# Patient Record
Sex: FEMALE | Race: WHITE | NOT HISPANIC OR LATINO | Employment: OTHER | URBAN - METROPOLITAN AREA
[De-identification: names, ages, dates, MRNs, and addresses within clinical notes are randomized per-mention and may not be internally consistent; named-entity substitution may affect disease eponyms.]

---

## 2017-02-01 ENCOUNTER — OFFICE VISIT (OUTPATIENT)
Dept: FAMILY MEDICINE | Facility: CLINIC | Age: 72
End: 2017-02-01
Payer: MEDICARE

## 2017-02-01 VITALS — HEIGHT: 67 IN | TEMPERATURE: 98.2 F | WEIGHT: 161 LBS | BODY MASS INDEX: 25.27 KG/M2 | RESPIRATION RATE: 18 BRPM

## 2017-02-01 DIAGNOSIS — M70.62 TROCHANTERIC BURSITIS OF LEFT HIP: Primary | ICD-10-CM

## 2017-02-01 PROCEDURE — 99214 OFFICE O/P EST MOD 30 MIN: CPT | Performed by: FAMILY MEDICINE

## 2017-02-01 NOTE — PROGRESS NOTES
SUBJECTIVE:                                                    Loraine Aviles is a 71 year old female who presents to clinic today for the following health issues:      Joint Pain     Onset: about 10 days     Description:   Location: left hip  Character: Sharp    Intensity: moderate    Progression of Symptoms: worse    Accompanying Signs & Symptoms:  Other symptoms: none   History:   Previous similar pain: no    Precipitating factors:   Trauma or overuse: no     Alleviating factors:  Improved by: nothing       Therapies Tried and outcome: none          Problem list and histories reviewed & adjusted, as indicated.  Additional history: as documented    Further history obtained, clarified or corrected by physician:  She complains of pain over the left greater trochanter when she lays on it at night.    OBJECTIVE:  LUNGS: clear to auscultation, normal breath sounds  CV: RRR without murmur  ABD: BS+, soft, nontender, no masses, no hepatosplenomegaly  EXT: There is tenderness to palpation over the left greater trochanter. She has full range of motion of the hip joint without pain or tenderness.    ASSESSMENT:  Trochanteric bursitis of left hip    PLAN:  Orders Placed This Encounter     naproxen (NAPROSYN) 375 MG tablet     If no improvement then consider cortisone injection.

## 2017-02-01 NOTE — PATIENT INSTRUCTIONS
Thank you for choosing [facility].  You may be receiving a survey in the mail from [facility] regarding your visit today.  Please take a few minutes to complete and return the survey to let us know how we are doing.      Our Clinic hours are:  Mondays    7:20 am - 7 pm  Tues -  Fri  7:20 am - 5 pm    Clinic Phone: [phone]    The clinic lab opens at 7:30 am Mon - Fri and appointments are required.    [facility]. [phone]  Monday-Thursday 8 am - 7pm  Tues/Wed/Fri 8 am - 5:30 pm

## 2017-02-07 ENCOUNTER — TELEPHONE (OUTPATIENT)
Dept: FAMILY MEDICINE | Facility: CLINIC | Age: 72
End: 2017-02-07

## 2017-02-07 DIAGNOSIS — M70.62 TROCHANTERIC BURSITIS OF LEFT HIP: Primary | ICD-10-CM

## 2017-02-07 RX ORDER — NAPROXEN 500 MG/1
500 TABLET ORAL
Qty: 90 TABLET | Refills: 0 | Status: SHIPPED | OUTPATIENT
Start: 2017-02-07 | End: 2017-02-17

## 2017-02-07 NOTE — TELEPHONE ENCOUNTER
Naproxen was written for 3 tabs/day, #30.  [facility] gave them #90.  Did you want a 10 d course #30 or enough for 1 month #90?  Advise.  Felicity

## 2017-02-07 NOTE — TELEPHONE ENCOUNTER
Reason for Call:  Other clarification    Detailed comments: Provide care calling stating they need clarification on the directions of pt's prescription Naproxen. They said on paperwork it says 3 times a day for 10 days and when they picked it up the directions were 1 a day.    Phone Number Patient can be reached at: Other phone number:  [facility] [phone]    Best Time: day    Can we leave a detailed message on this number? not sure    Call taken on 2/7/2017 at 9:40 AM by Lina Chirinos

## 2017-02-10 ENCOUNTER — APPOINTMENT (OUTPATIENT)
Dept: GENERAL RADIOLOGY | Facility: CLINIC | Age: 72
End: 2017-02-10
Attending: NURSE PRACTITIONER
Payer: MEDICARE

## 2017-02-10 ENCOUNTER — HOSPITAL ENCOUNTER (EMERGENCY)
Facility: CLINIC | Age: 72
Discharge: HOME | End: 2017-02-10
Attending: NURSE PRACTITIONER | Admitting: NURSE PRACTITIONER
Payer: MEDICARE

## 2017-02-10 VITALS
BODY MASS INDEX: 26.24 KG/M2 | DIASTOLIC BLOOD PRESSURE: 81 MMHG | WEIGHT: 165 LBS | OXYGEN SATURATION: 95 % | TEMPERATURE: 98.2 F | HEART RATE: 63 BPM | SYSTOLIC BLOOD PRESSURE: 142 MMHG | RESPIRATION RATE: 18 BRPM

## 2017-02-10 DIAGNOSIS — S93.401A SPRAIN OF RIGHT ANKLE, UNSPECIFIED LIGAMENT, INITIAL ENCOUNTER: ICD-10-CM

## 2017-02-10 PROCEDURE — 73630 X-RAY EXAM OF FOOT: CPT | Mod: RT

## 2017-02-10 PROCEDURE — 99213 OFFICE O/P EST LOW 20 MIN: CPT

## 2017-02-10 PROCEDURE — 99213 OFFICE O/P EST LOW 20 MIN: CPT | Performed by: NURSE PRACTITIONER

## 2017-02-10 PROCEDURE — 73610 X-RAY EXAM OF ANKLE: CPT | Mod: RT

## 2017-02-10 NOTE — ED PROVIDER NOTES
History     Chief Complaint   Patient presents with     Ankle Pain     Tripped and fell this morning. Complaining of pain to the right ankle.      HPI  Loraine Aviles is a 71 year old female with history of seizure disorder, urinary incontinence, depression, and falls who is accompanied by her caregiver for evaluation of right ankle pain after falling this morning.  She tripped over her feet and fell this morning inside the house on a hard floor.  She denies hitting her head.  Denies neck or back pain.  No other areas of injury or concern.    Patient Active Problem List   Diagnosis     Seizure disorder (H)     Edema     Fall     Delay in development     Other urinary incontinence     Mild major depression (H)     Urgency of urination     HYPERLIPIDEMIA LDL GOAL <130     Benign neoplasm of colon     Health Care Home     Advanced directives, counseling/discussion     Nuclear cataract       No current facility-administered medications on file prior to encounter.  Current Outpatient Prescriptions on File Prior to Encounter:  naproxen (NAPROSYN) 500 MG tablet Take 1 tablet (500 mg) by mouth 3 times daily (with meals) for 10 days and then as needed not to exceed 2/day.   naproxen (NAPROSYN) 375 MG tablet Take 1 tablet (375 mg) by mouth 3 times daily (with meals) for 10 days and then PRN not to exceed 2 tabs/day.   naproxen (NAPROSYN) 375 MG tablet Take 1 tablet (375 mg) by mouth 3 times daily (with meals)   potassium chloride SA (POTASSIUM CHLORIDE) 20 MEQ CR tablet Take 1 tablet (20 mEq) by mouth daily   Diaper Rash Products (A+D PREVENT) OINT Apply after voiding and with shower.   zinc oxide (DESITIN) 40 % ointment Apply topically with every void/shower.   carBAMazepine (TEGRETOL) 200 MG tablet Take 300 mg by mouth 2 times daily 200 mg in Am and 300 mg in the PM   FLUoxetine HCl, PMDD, 10 MG TABS Take 10 mg by mouth daily   FLUoxetine HCl, PMDD, 20 MG CAPS Take 20 mg by mouth daily   furosemide (LASIX) 40 MG  tablet Take 40 mg by mouth daily   melatonin 3 MG tablet Take by mouth nightly as needed for sleep   ibuprofen (ADVIL,MOTRIN) 200 MG tablet Take 200 mg by mouth every 4 hours as needed for mild pain   potassium chloride (K-DUR) 10 MEQ tablet Take 2 tablets (20 mEq) by mouth daily   Multiple Vitamins-Minerals (CERTAGEN) TABS Take 1 tablet by mouth daily   divalproex (DEPAKOTE) 500 MG EC tablet Take 1 tablet by mouth 3 times daily.   acetaminophen (TYLENOL) 500 MG tablet Take 2 tablets by mouth 3 times daily.   CALCIUM 500 + D PO 1 TABLET ORALLY 3 TIMES DAILY   FOSAMAX 70 MG OR TABS ONE TABLET WEEKLY   SIMVASTATIN 20 MG OR TABS 1 TABLET AT BEDTIME         I have reviewed the Medications, Allergies, Past Medical and Surgical History, and Social History in the Epic system.    Review of Systems  As mentioned above in the history present illness. All other systems were reviewed and are negative.    Physical Exam   BP: 142/81 mmHg  Pulse: 63  Temp: 98.2  F (36.8  C)  Resp: 18  Weight: 74.844 kg (165 lb)  SpO2: 95 %  Physical Exam   Appearance:She appears well, vital signs are normal.   Head/Neck: Normocephalic. Atraumatic. No cervical spine tenderness.  Resp: Lung sounds CTA.  CV: RRR. No murmur.  Musc: No midline back tenderness. Right Ankle swelling and tenderness over the lateral malleolus. No tenderness over the medial aspect of the ankle. There is mild fifth metatarsal is not tender. The ankle joint is intact without excessive opening on stressing.   The rest of the foot, ankle and leg exam is normal.            ED Course   Procedures         Results for orders placed or performed during the hospital encounter of 02/10/17 (from the past 48 hour(s))   Ankle XR, G/E 3 views, right    Narrative    XR ANKLE RT G/E 3 VW, XR FOOT RT G/E 3 VW  2/10/2017 12:57 PM    HISTORY:  fall, lateral foot and ankle pain    COMPARISON:  12/5/2007      Impression    IMPRESSION:      Ankle: No fractures identified. Diffuse osteopenia.  Stable appearing  hardware in the talocalcaneal region status post arthrodesis.  Degenerative changes at the lateral and probably medial ankle joint.  Mild soft tissue swelling over the lateral malleolus. Dense dystrophic  calcifications in the soft tissues lateral to the ankle.     Foot: Diffuse osteopenia. There is a screw across the proximal shaft  of the first toe proximal phalanx without evidence for complication.  Flexion of multiple toe IP joints. No evidence for acute traumatic  injury. Deformity of the third metatarsal is likely related to old  healed trauma.    JESS GARCIA MD   Foot  XR, G/E 3 views, right    Narrative    XR ANKLE RT G/E 3 VW, XR FOOT RT G/E 3 VW  2/10/2017 12:57 PM    HISTORY:  fall, lateral foot and ankle pain    COMPARISON:  12/5/2007      Impression    IMPRESSION:      Ankle: No fractures identified. Diffuse osteopenia. Stable appearing  hardware in the talocalcaneal region status post arthrodesis.  Degenerative changes at the lateral and probably medial ankle joint.  Mild soft tissue swelling over the lateral malleolus. Dense dystrophic  calcifications in the soft tissues lateral to the ankle.     Foot: Diffuse osteopenia. There is a screw across the proximal shaft  of the first toe proximal phalanx without evidence for complication.  Flexion of multiple toe IP joints. No evidence for acute traumatic  injury. Deformity of the third metatarsal is likely related to old  healed trauma.    JESS GARCIA MD       Labs Ordered and Resulted from Time of ED Arrival Up to the Time of Departure from the ED - No data to display    Assessments & Plan (with Medical Decision Making)   Loraine Aviles is a 71 year old female with history of seizure disorder, urinary incontinence, depression, and falls who is accompanied by her caregiver for evaluation of right ankle pain after falling this morning.  She tripped over her feet and fell this morning inside the house on a hard floor.  She denies  hitting her head.  Denies neck or back pain.  No other areas of injury or concern. VSS. Afebrile. Right Ankle swelling and tenderness over the lateral malleolus. No tenderness over the medial aspect of the ankle. There is mild fifth metatarsal is not tender. Remainder of exam as noted above unremarkable. Xray negative for acute fracture.  Discussed results with patient.  She uses a walker at home and believes this will be sufficient, caregiver agrees. Will treat for ankle sprain with ace wrap today.  Instructed to Ice, elevate and take Tylenol or Ibuprofen for pain.  Follow-up with PCP if failing to improve.     I have reviewed the nursing notes.    I have reviewed the findings, diagnosis, plan and need for follow up with the patient.    Discharge Medication List as of 2/10/2017  1:22 PM          Final diagnoses:   Sprain of right ankle, unspecified ligament, initial encounter       2/10/2017   [facility] EMERGENCY DEPARTMENT      Gretchen Godron APRN CNP  02/10/17 [id number]

## 2017-02-10 NOTE — ED NOTES
Agree with triage note. Patient does report having a previous fracture to this foot in the past. Not on blood thinners. Denies hitting her head. Fall occurred inside this morning.

## 2017-02-10 NOTE — DISCHARGE INSTRUCTIONS
Treating Ankle Sprains  Treatment will depend on how bad your sprain is. For a severe sprain, healing may take 3 months or more.  Right after your injury: Use R.I.C.E.    Rest: At first, keep weight off the ankle as much as you can. You may be given crutches to help you walk without putting weight on the ankle.    Ice: Put an ice pack on the ankle for 15 minutes. Remove the pack and wait at least 30 minutes. Repeat for up to 3 days. This helps reduce swelling.    Compression: To reduce swelling and keep the joint stable, you may need to wrap the ankle with an elastic bandage. For more severe sprains, you may need an ankle brace or a cast.    Elevation: To reduce swelling, keep your ankle raised above your heart when you sit or lie down.  Medicine  Your healthcare provider may suggest oral non-steroidal anti-inflammatory medicine (NSAIDs), such as ibuprofen. This relieves the pain and helps reduce any swelling. Be sure to take your medicine as directed.  Contrast baths  After 3 days, soak your ankle in warm water for 30 seconds, then in cool water for 30 seconds. Go back and forth for 5 minutes. Doing this every 2 hours will help keep the swelling down.  Exercises    After about 2 to 3 weeks, you may be given exercises to strengthen the ligaments and muscles in the ankle. Doing these exercises will help prevent another ankle sprain. Exercises may include standing on your toes and then on your heels and doing ankle curls.  Ankle curls    Sit on the edge of a sturdy table or lie on your back.    Pull your toes toward you. Then point them away from you. Repeat for 2 to 3 minutes.     6259-4627 [employer]. [address] [place] [address], [place] [address]. All rights reserved. This information is not intended as a substitute for professional medical care. Always follow your healthcare professional's instructions.

## 2017-02-10 NOTE — ED AVS SNAPSHOT
[facility] Emergency Department    [address] [address]    Phone:  [phone]    Fax:  [phone]                                       Loraine Aviles   MRN: [id number]    Department:  [facility] Emergency Department   Date of Visit:  2/10/2017           After Visit Summary Signature Page     I have received my discharge instructions, and my questions have been answered. I have discussed any challenges I see with this plan with the nurse or doctor.    ..........................................................................................................................................  Patient/Patient Representative Signature      ..........................................................................................................................................  Patient Representative Print Name and Relationship to Patient    ..................................................               ................................................  Date                                            Time    ..........................................................................................................................................  Reviewed by Signature/Title    ...................................................              ..............................................  Date                                                            Time

## 2017-02-10 NOTE — ED AVS SNAPSHOT
[facility] Emergency Department    [address]    [facility] [address]    Phone:  [phone]    Fax:  [phone]                                       Loraine Aviles   MRN: [id number]    Department:  [facility] Emergency Department   Date of Visit:  2/10/2017           Patient Information     Date Of Birth          1945        Your diagnoses for this visit were:     Sprain of right ankle, unspecified ligament, initial encounter        You were seen by Gretchen Gordon APRN CNP.      Follow-up Information     Follow up with Carlos Servin MD.    Specialty:  Family Practice    Why:  As needed    Contact information:    [facility]  [address]  [facility] [address]  [phone]          Discharge Instructions         Treating Ankle Sprains  Treatment will depend on how bad your sprain is. For a severe sprain, healing may take 3 months or more.  Right after your injury: Use R.I.C.E.    Rest: At first, keep weight off the ankle as much as you can. You may be given crutches to help you walk without putting weight on the ankle.    Ice: Put an ice pack on the ankle for 15 minutes. Remove the pack and wait at least 30 minutes. Repeat for up to 3 days. This helps reduce swelling.    Compression: To reduce swelling and keep the joint stable, you may need to wrap the ankle with an elastic bandage. For more severe sprains, you may need an ankle brace or a cast.    Elevation: To reduce swelling, keep your ankle raised above your heart when you sit or lie down.  Medicine  Your healthcare provider may suggest oral non-steroidal anti-inflammatory medicine (NSAIDs), such as ibuprofen. This relieves the pain and helps reduce any swelling. Be sure to take your medicine as directed.  Contrast baths  After 3 days, soak your ankle in warm water for 30 seconds, then in cool water for 30 seconds. Go back and forth for 5 minutes. Doing this every 2 hours will help keep the swelling  down.  Exercises    After about 2 to 3 weeks, you may be given exercises to strengthen the ligaments and muscles in the ankle. Doing these exercises will help prevent another ankle sprain. Exercises may include standing on your toes and then on your heels and doing ankle curls.  Ankle curls    Sit on the edge of a sturdy table or lie on your back.    Pull your toes toward you. Then point them away from you. Repeat for 2 to 3 minutes.     7266-3093 [employer]. [address] [place] [address], [place] [address]. All rights reserved. This information is not intended as a substitute for professional medical care. Always follow your healthcare professional's instructions.          24 Hour Appointment Hotline       To make an appointment at any [facility], call [phone] ([phone]). If you don't have a family doctor or clinic, we will help you find one. [facility] clinics are conveniently located to serve the needs of you and your family.             Review of your medicines      Our records show that you are taking the medicines listed below. If these are incorrect, please call your family doctor or clinic.        Dose / Directions Last dose taken    A+D PREVENT Oint   Quantity:  60 g        Apply after voiding and with shower.   Refills:  1        acetaminophen 500 MG tablet   Commonly known as:  TYLENOL   Dose:  2 tablet   Quantity:  360 tablet        Take 2 tablets by mouth 3 times daily.   Refills:  0        CALCIUM 500 + D PO        1 TABLET ORALLY 3 TIMES DAILY   Refills:  0        carBAMazepine 200 MG tablet   Commonly known as:  TEGretol   Dose:  300 mg        Take 300 mg by mouth 2 times daily 200 mg in Am and 300 mg in the PM   Refills:  0        CERTAGEN Tabs   Dose:  1 tablet   Quantity:  30 tablet        Take 1 tablet by mouth daily   Refills:  0        DEPAKOTE 500 MG EC tablet   Dose:  500 mg   Quantity:  120 tablet   Generic drug:  divalproex        Take 1 tablet by mouth 3 times  daily.   Refills:  0        * FLUoxetine HCl (PMDD) 10 MG Tabs   Dose:  10 mg        Take 10 mg by mouth daily   Refills:  0        * FLUoxetine HCl (PMDD) 20 MG Caps   Dose:  20 mg        Take 20 mg by mouth daily   Refills:  0        FOSAMAX 70 MG tablet   Quantity:  4   Generic drug:  alendronate        ONE TABLET WEEKLY   Refills:  0        furosemide 40 MG tablet   Commonly known as:  LASIX   Dose:  40 mg        Take 40 mg by mouth daily   Refills:  0        ibuprofen 200 MG tablet   Commonly known as:  ADVIL/MOTRIN   Dose:  200 mg        Take 200 mg by mouth every 4 hours as needed for mild pain   Refills:  0        melatonin 3 MG tablet        Take by mouth nightly as needed for sleep   Refills:  0        * naproxen 375 MG tablet   Commonly known as:  NAPROSYN   Dose:  375 mg   Quantity:  30 tablet        Take 1 tablet (375 mg) by mouth 3 times daily (with meals)   Refills:  1        * naproxen 500 MG tablet   Commonly known as:  NAPROSYN   Dose:  500 mg   Quantity:  90 tablet        Take 1 tablet (500 mg) by mouth 3 times daily (with meals) for 10 days and then as needed not to exceed 2/day.   Refills:  0        * naproxen 375 MG tablet   Commonly known as:  NAPROSYN   Dose:  375 mg   Quantity:  90 tablet        Take 1 tablet (375 mg) by mouth 3 times daily (with meals) for 10 days and then PRN not to exceed 2 tabs/day.   Refills:  0        potassium chloride 10 MEQ tablet   Commonly known as:  K-TAB,KLOR-CON   Dose:  20 mEq   Quantity:  1 tablet        Take 2 tablets (20 mEq) by mouth daily   Refills:  0        potassium chloride SA 20 MEQ CR tablet   Commonly known as:  potassium chloride   Dose:  20 mEq   Quantity:  90 tablet        Take 1 tablet (20 mEq) by mouth daily   Refills:  1        simvastatin 20 MG tablet   Commonly known as:  ZOCOR   Quantity:  30        1 TABLET AT BEDTIME   Refills:  thru 9/08        zinc oxide 40 % ointment   Commonly known as:  DESITIN   Quantity:  60 g        Apply  topically with every void/shower.   Refills:  1        * Notice:  This list has 5 medication(s) that are the same as other medications prescribed for you. Read the directions carefully, and ask your doctor or other care provider to review them with you.            Procedures and tests performed during your visit     Ankle XR, G/E 3 views, right    Foot  XR, G/E 3 views, right      Orders Needing Specimen Collection     None      Pending Results     No orders found from 2/9/2017 to 2/11/2017.            Pending Culture Results     No orders found from 2/9/2017 to 2/11/2017.       Test Results from your hospital stay           2/10/2017  1:11 PM - Interface, Radiant Ib      Narrative     XR ANKLE RT G/E 3 VW, XR FOOT RT G/E 3 VW  2/10/2017 12:57 PM    HISTORY:  fall, lateral foot and ankle pain    COMPARISON:  12/5/2007        Impression     IMPRESSION:      Ankle: No fractures identified. Diffuse osteopenia. Stable appearing  hardware in the talocalcaneal region status post arthrodesis.  Degenerative changes at the lateral and probably medial ankle joint.  Mild soft tissue swelling over the lateral malleolus. Dense dystrophic  calcifications in the soft tissues lateral to the ankle.     Foot: Diffuse osteopenia. There is a screw across the proximal shaft  of the first toe proximal phalanx without evidence for complication.  Flexion of multiple toe IP joints. No evidence for acute traumatic  injury. Deformity of the third metatarsal is likely related to old  healed trauma.    JESS GARCIA MD         2/10/2017  1:11 PM - Interface, Radiant Ib      Narrative     XR ANKLE RT G/E 3 VW, XR FOOT RT G/E 3 VW  2/10/2017 12:57 PM    HISTORY:  fall, lateral foot and ankle pain    COMPARISON:  12/5/2007        Impression     IMPRESSION:      Ankle: No fractures identified. Diffuse osteopenia. Stable appearing  hardware in the talocalcaneal region status post arthrodesis.  Degenerative changes at the lateral and probably medial  "ankle joint.  Mild soft tissue swelling over the lateral malleolus. Dense dystrophic  calcifications in the soft tissues lateral to the ankle.     Foot: Diffuse osteopenia. There is a screw across the proximal shaft  of the first toe proximal phalanx without evidence for complication.  Flexion of multiple toe IP joints. No evidence for acute traumatic  injury. Deformity of the third metatarsal is likely related to old  healed trauma.    JESS GARCIA MD                Thank you for choosing [facility]       Thank you for choosing [facility] for your care. Our goal is always to provide you with excellent care. Hearing back from our patients is one way we can continue to improve our services. Please take a few minutes to complete the written survey that you may receive in the mail after you visit with us. Thank you!        [facility] Information     [employer] lets you send messages to your doctor, view your test results, renew your prescriptions, schedule appointments and more. To sign up, go to [email or web address] ([employer] . Click on \"Log in\" on the left side of the screen, which will take you to the Welcome page. Then click on \"Sign up Now\" on the right side of the page.     You will be asked to enter the access code listed below, as well as some personal information. Please follow the directions to create your username and password.     Your access code is: [email or web address]  Expires: 2017  1:22 PM     Your access code will  in 90 days. If you need help or a new code, please call your [facility] clinic or [phone].        Care EveryWhere ID     This is your Care EveryWhere ID. This could be used by other organizations to access your [facility] medical records  [id number]        After Visit Summary       This is your record. Keep this with you and show to your community pharmacist(s) and doctor(s) at your next visit.                  "

## 2017-03-01 ENCOUNTER — TELEPHONE (OUTPATIENT)
Dept: FAMILY MEDICINE | Facility: CLINIC | Age: 72
End: 2017-03-01

## 2017-03-01 NOTE — TELEPHONE ENCOUNTER
.  Panel Management Review      Patient has the following on her problem list:     Depression / Dysthymia review  PHQ-9 SCORE 7/28/2014 4/6/2016 3/1/2017   Total Score 2 - -   Total Score - 0 2      Patient is due for:  PHQ9      Composite cancer screening  Chart review shows that this patient is due/due soon for the following Mammogram   Summary:    Patient is due/failing the following:   MAMMOGRAM and PHQ9    Action needed:   Did PHQ 9  Patient refused mammogram     Type of outreach:    Spoke with patient - did PHQ 9     Questions for provider review:    None                                                                                                                                    Laurence Whitfield CMA       Chart routed to none .

## 2017-03-02 ASSESSMENT — PATIENT HEALTH QUESTIONNAIRE - PHQ9: SUM OF ALL RESPONSES TO PHQ QUESTIONS 1-9: 2

## 2017-03-14 ENCOUNTER — OFFICE VISIT (OUTPATIENT)
Dept: FAMILY MEDICINE | Facility: CLINIC | Age: 72
End: 2017-03-14
Payer: MEDICARE

## 2017-03-14 ENCOUNTER — MEDICAL CORRESPONDENCE (OUTPATIENT)
Dept: HEALTH INFORMATION MANAGEMENT | Facility: CLINIC | Age: 72
End: 2017-03-14

## 2017-03-14 ENCOUNTER — TELEPHONE (OUTPATIENT)
Dept: FAMILY MEDICINE | Facility: CLINIC | Age: 72
End: 2017-03-14

## 2017-03-14 ENCOUNTER — TRANSFERRED RECORDS (OUTPATIENT)
Dept: HEALTH INFORMATION MANAGEMENT | Facility: CLINIC | Age: 72
End: 2017-03-14

## 2017-03-14 ENCOUNTER — RADIANT APPOINTMENT (OUTPATIENT)
Dept: GENERAL RADIOLOGY | Facility: CLINIC | Age: 72
End: 2017-03-14
Attending: FAMILY MEDICINE
Payer: MEDICARE

## 2017-03-14 VITALS
OXYGEN SATURATION: 95 % | HEIGHT: 67 IN | RESPIRATION RATE: 16 BRPM | DIASTOLIC BLOOD PRESSURE: 70 MMHG | BODY MASS INDEX: 26.06 KG/M2 | HEART RATE: 74 BPM | WEIGHT: 166 LBS | TEMPERATURE: 98.8 F | SYSTOLIC BLOOD PRESSURE: 130 MMHG

## 2017-03-14 DIAGNOSIS — R05.9 COUGH: ICD-10-CM

## 2017-03-14 DIAGNOSIS — J20.8 ACUTE VIRAL BRONCHITIS: Primary | ICD-10-CM

## 2017-03-14 PROCEDURE — 99213 OFFICE O/P EST LOW 20 MIN: CPT | Performed by: FAMILY MEDICINE

## 2017-03-14 PROCEDURE — 71020 XR CHEST 2 VW: CPT

## 2017-03-14 RX ORDER — GUAIFENESIN 200 MG/1
400 TABLET ORAL EVERY 6 HOURS PRN
Qty: 60 TABLET | Refills: 0 | Status: SHIPPED | OUTPATIENT
Start: 2017-03-14 | End: 2018-05-22

## 2017-03-14 NOTE — PROGRESS NOTES
"  SUBJECTIVE:                                                    Loraine Aviles is a 71 year old female who presents to clinic today for the following health issues:    Patient is here with personal staff.    ENT Symptoms             Symptoms: cc Present Absent Comment   Fever/Chills   X SLIGHT TEMPERATURE OF 99.3   Fatigue   X    Muscle Aches   X    Eye Irritation   X    Sneezing   X    Nasal John/Drg   X    Sinus Pressure/Pain   X    Loss of smell   X    Dental pain   X    Sore Throat   X    Swollen Glands   X    Ear Pain/Fullness   X    Cough  X  Patient reports it is crackling; staff describes it as \"very deep\".  Not reported to be occurring in severe bouts.   Wheeze   x    Chest Pain  X  TIGHTNESS, PRESSURE   Shortness of breath   X    Rash   X    Other  X  HEADACHE FROM COUGHING     Symptom duration:  4-5 DAYS   Symptom severity:  MODERATE   Treatments tried:  OTC ROBUTUSSIN   Contacts:  possibly from work   Verified above history with patient.      Problem list and histories reviewed & adjusted, as indicated.  Additional history: as documented    Patient Active Problem List   Diagnosis     Seizure disorder (H)     Edema     Fall     Delay in development     Other urinary incontinence     Mild major depression (H)     Urgency of urination     HYPERLIPIDEMIA LDL GOAL <130     Benign neoplasm of colon     Health Care Home     Advanced directives, counseling/discussion     Nuclear cataract     Past Surgical History   Procedure Laterality Date     Surgical history of -        (R) Hammertoe Surgery--all toes on right foot     Colonoscopy  05/19/00     Appendectomy       Breast biopsy, rt/lt       breast bx     Surgical history of -        IPG replacement      Joint replacemtn, knee rt/lt Left 10/2014     Joint Replacement knee RT/LT     Replace generator stimulator (location) N/A 1/13/2015     Procedure: REPLACE GENERATOR STIMULATOR (LOCATION);  Surgeon: Jami Cheek MD;  Location: [facility]     Implant " stimulator and leads sacral nerve (stage one and two) N/A 1/13/2015     Procedure: IMPLANT STIMULATOR AND LEADS SACRAL NERVE (STAGE ONE AND TWO);  Surgeon: Jami Cheek MD;  Location: [facility]     Phacoemulsification with standard intraocular lens implant Right 10/8/2015     Procedure: PHACOEMULSIFICATION WITH STANDARD INTRAOCULAR LENS IMPLANT;  Surgeon: Andrés Brandon MD;  Location: [facility]       Social History   Substance Use Topics     Smoking status: Never Smoker     Smokeless tobacco: Never Used     Alcohol use No     Family History   Problem Relation Age of Onset     Family History Negative No family hx of          Current Outpatient Prescriptions   Medication Sig Dispense Refill     guaiFENesin (ORGANIDIN) 200 MG TABS tablet Take 2 tablets (400 mg) by mouth every 6 hours as needed for cough 60 tablet 0     potassium chloride SA (POTASSIUM CHLORIDE) 20 MEQ CR tablet Take 1 tablet (20 mEq) by mouth daily 90 tablet 1     carBAMazepine (TEGRETOL) 200 MG tablet Take 300 mg by mouth 2 times daily 200 mg in Am and 300 mg in the PM       FLUoxetine HCl, PMDD, 10 MG TABS Take 10 mg by mouth daily       FLUoxetine HCl, PMDD, 20 MG CAPS Take 20 mg by mouth daily       furosemide (LASIX) 40 MG tablet Take 40 mg by mouth daily       Multiple Vitamins-Minerals (CERTAGEN) TABS Take 1 tablet by mouth daily 30 tablet      divalproex (DEPAKOTE) 500 MG EC tablet Take 1 tablet by mouth 3 times daily. 120 tablet 0     acetaminophen (TYLENOL) 500 MG tablet Take 2 tablets by mouth 3 times daily. 360 tablet 0     CALCIUM 500 + D PO 1 TABLET ORALLY 3 TIMES DAILY       FOSAMAX 70 MG OR TABS ONE TABLET WEEKLY 4 0     SIMVASTATIN 20 MG OR TABS 1 TABLET AT BEDTIME 30 thru 9/08     ibuprofen (ADVIL,MOTRIN) 200 MG tablet Take 200 mg by mouth every 4 hours as needed for mild pain       Allergies   Allergen Reactions     Detrol [Tolterodine Tartrate] Other (See Comments)     falls     Ditropan [Oxybutynin Chloride] Other (See Comments)  "    falls     Medicated Other (See Comments)     Allergic to generic seizure meds.       Reviewed and updated as needed this visit by clinical staff  Allergies       Reviewed and updated as needed this visit by Provider         ROS:  C: NEGATIVE for fever, chills, change in weight  I: NEGATIVE for worrisome rashes, moles or lesions  E: NEGATIVE for vision changes or irritation  ENT/MOUTH: see above  RESP:as above  CV: NEGATIVE for chest pain, palpitations or peripheral edema  GI: NEGATIVE for nausea, abdominal pain, heartburn, or change in bowel habits  M: NEGATIVE for significant arthralgias or myalgia    OBJECTIVE:                                                    /70  Pulse 74  Temp 98.8  F (37.1  C) (Tympanic)  Resp 16  Ht 5' 6.5\" (1.689 m)  Wt 166 lb (75.3 kg)  SpO2 95%  BMI 26.39 kg/m2  Body mass index is 26.39 kg/(m^2).  GENERAL: well-nourished,  alert and no distress, ambulatory w/o assist; coughed a few times with deep sounding crackle   EYES: pink conjunctivae, no icterus  NECK: moderate cervical adenopathy, nontender  HEENT: tympanic membrane intact and pearly bilaterally, nose with mild congestion, no sinus tenderness, throat moderately erythematous, tonsils grade +1 w/ no exudates, no oral ulcers  RESP: fair air entry, equal breath sounds, no wheezing; no crackles; mild bronchial breath sounds but patient does not have good effort in deep inspiration to assess breath sounds adequately  CV: normal rate, regular rhythm, normal S1 S2, no S3 or S4 and no murmur  SKIN:  Good turgor, no rashes    Diagnostic test results:  Diagnostic Test Results:  No results found for this or any previous visit (from the past 24 hour(s)).     ASSESSMENT/PLAN:                                                        ICD-10-CM    1. Acute viral bronchitis J20.8 guaiFENesin (ORGANIDIN) 200 MG TABS tablet  Discussed since difficult to assess lungs form ausc, CXR may be needed. Patient and staff concurred.  Discussed CXR " images appeared unchanged compared to previous.  No focal consolidation.  Treat supportively.  Expectorant use prescribed.  Push oral fluids.  If radiology reads otherwise, treat accordingly.  Return precautions discussed and given to patient.     2. Cough R05 XR Chest 2 Views     guaiFENesin (ORGANIDIN) 200 MG TABS tablet       Follow up with Provider - 2-3 days if worsening   There are no Patient Instructions on file for this visit.    Eliud Garner MD  [facility]

## 2017-03-14 NOTE — MR AVS SNAPSHOT
After Visit Summary   3/14/2017    Loraine Aviles    MRN: [id number]           Patient Information     Date Of Birth          1945        Visit Information        Provider Department      3/14/2017 4:20 PM Eliud Garner MD [facility]        Today's Diagnoses     Acute viral bronchitis    -  1    Cough          Care Instructions      Bronchitis, Viral (Adult)    You have a viral bronchitis. Bronchitis is inflammation and swelling of the lining of the lungs. This is often caused by an infection. Symptoms include a dry, hacking cough that is worse at night. The cough may bring up yellow-green mucus. You may also feel short of breath or wheeze. Other symptoms may include tiredness, chest discomfort, and chills.  Bronchitis that is caused by a virus is not treated with antibiotics. Instead, medicines may be given to help relieve symptoms. Symptoms can last up to 2 weeks, although the cough may last much longer.  This illness is contagious during the first few days and is spread through the air by coughing and sneezing, or by direct contact (touching the sick person and then touching your own eyes, nose, or mouth).  Most viral illnesses resolve within 10 to 14 days with rest and simple home remedies, although they may sometimes last for several weeks.  Home care    If symptoms are severe, rest at home for the first 2 to 3 days. When you go back to your usual activities, don't let yourself get too tired.    Do not smoke. Also avoid being exposed to secondhand smoke.    You may use over-the-counter medicine to control fever or pain, unless another pain medicine was prescribed. (Note: If you have chronic liver or kidney disease or have ever had a stomach ulcer or gastrointestinal bleeding, talk with your healthcare provider before using these medicines. Also talk to your provider if you are taking medicine to prevent blood clots.) Aspirin should never be given to anyone  younger than 18 years of age who is ill with a viral infection or fever. It may cause severe liver or brain damage.    Your appetite may be poor, so a light diet is fine. Avoid dehydration by drinking 6 to 8 glasses of fluids per day (such as water, soft drinks, sports drinks, juices, tea, or soup). Extra fluids will help loosen secretions in the nose and lungs.    Over-the-counter cough, cold, and sore-throat medicines will not shorten the length of the illness, but they may help to reduce symptoms. (Note: Do not use decongestants if you have high blood pressure.)  Follow-up care  Follow up with your healthcare provider, or as advised.  If you had an X-ray or ECG (electrocardiogram), a specialist will review it. You will be notified of any new findings that may affect your care.  Note: If you are age 65 or older, or if you have a chronic lung disease or condition that affects your immune system, or you smoke, talk to your healthcare provider about having pneumococcal vaccinations and a yearly influenza vaccination (flu shot).  When to seek medical advice   Call your healthcare provider right away if any of these occur:    Fever of 100.4 F (38 C) or higher    Coughing up increased amounts of colored sputum    Weakness, drowsiness, headache, facial pain, ear pain, or a stiff neck  Call 911, or get immediate medical care  Contact emergency services right away if any of these occur:    Coughing up blood    Worsening weakness, drowsiness, headache, or stiff neck    Trouble breathing, wheezing, or pain with breathing    [id number] [employer]. [address] [place] [address], [place] [address]. All rights reserved. This information is not intended as a substitute for professional medical care. Always follow your healthcare professional's instructions.              Follow-ups after your visit        Who to contact     If you have questions or need follow up information about today's clinic visit or your schedule  "please contact [facility] directly at [phone].  Normal or non-critical lab and imaging results will be communicated to you by MyChart, letter or phone within 4 business days after the clinic has received the results. If you do not hear from us within 7 days, please contact the clinic through [facility] or phone. If you have a critical or abnormal lab result, we will notify you by phone as soon as possible.  Submit refill requests through [facility] or call your pharmacy and they will forward the refill request to us. Please allow 3 business days for your refill to be completed.          Additional Information About Your Visit        [employer] Information     [facility] lets you send messages to your doctor, view your test results, renew your prescriptions, schedule appointments and more. To sign up, go to [email or web address] . Click on \"Log in\" on the left side of the screen, which will take you to the Welcome page. Then click on \"Sign up Now\" on the right side of the page.     You will be asked to enter the access code listed below, as well as some personal information. Please follow the directions to create your username and password.     Your access code is: [email or web address]  Expires: 2017  2:22 PM     Your access code will  in 90 days. If you need help or a new code, please call your [place] clinic or [phone].        Care EveryWhere ID     This is your Care EveryWhere ID. This could be used by other organizations to access your [place] medical records  [id number]        Your Vitals Were     Pulse Temperature Respirations Height Pulse Oximetry BMI (Body Mass Index)    74 98.8  F (37.1  C) (Tympanic) 16 5' 6.5\" (1.689 m) 95% 26.39 kg/m2       Blood Pressure from Last 3 Encounters:   17 130/70   02/10/17 142/81   16 123/75    Weight from Last 3 Encounters:   17 166 lb (75.3 kg)   02/10/17 165 lb (74.8 kg)   17 161 lb (73 kg)              We Performed the Following "     XR Chest 2 Views          Today's Medication Changes          These changes are accurate as of: 3/14/17  5:23 PM.  If you have any questions, ask your nurse or doctor.               Start taking these medicines.        Dose/Directions    guaiFENesin 200 MG Tabs tablet   Commonly known as:  ORGANIDIN   Used for:  Acute viral bronchitis, Cough   Started by:  Eliud Garner MD        Dose:  400 mg   Take 2 tablets (400 mg) by mouth every 6 hours as needed for cough   Quantity:  60 tablet   Refills:  0            Where to get your medicines      These medications were sent to [facility] [place] - [address] [address]  [id number] [place] [address] [address], [facility] [address]     Phone:  [phone]     guaiFENesin 200 MG Tabs tablet                Primary Care Provider Office Phone # Fax #    Carlos Servin [phone]       [facility] [address] [facility] [address]        Thank you!     Thank you for choosing [facility]  for your care. Our goal is always to provide you with excellent care. Hearing back from our patients is one way we can continue to improve our services. Please take a few minutes to complete the written survey that you may receive in the mail after your visit with us. Thank you!             Your Updated Medication List - Protect others around you: Learn how to safely use, store and throw away your medicines at www.disposemymeds.org.          This list is accurate as of: 3/14/17  5:23 PM.  Always use your most recent med list.                   Brand Name Dispense Instructions for use    acetaminophen 500 MG tablet    TYLENOL    360 tablet    Take 2 tablets by mouth 3 times daily.       CALCIUM 500 + D PO      1 TABLET ORALLY 3 TIMES DAILY       carBAMazepine 200 MG tablet    TEGretol     Take 300 mg by mouth 2 times daily 200 mg in Am and 300 mg in the PM       CERTAGEN Tabs     30 tablet    Take 1 tablet by mouth daily        DEPAKOTE 500 MG EC tablet   Generic drug:  divalproex     120 tablet    Take 1 tablet by mouth 3 times daily.       * FLUoxetine HCl (PMDD) 10 MG Tabs      Take 10 mg by mouth daily       * FLUoxetine HCl (PMDD) 20 MG Caps      Take 20 mg by mouth daily       FOSAMAX 70 MG tablet   Generic drug:  alendronate     4    ONE TABLET WEEKLY       furosemide 40 MG tablet    LASIX     Take 40 mg by mouth daily       guaiFENesin 200 MG Tabs tablet    ORGANIDIN    60 tablet    Take 2 tablets (400 mg) by mouth every 6 hours as needed for cough       ibuprofen 200 MG tablet    ADVIL/MOTRIN     Take 200 mg by mouth every 4 hours as needed for mild pain       potassium chloride SA 20 MEQ CR tablet    potassium chloride    90 tablet    Take 1 tablet (20 mEq) by mouth daily       simvastatin 20 MG tablet    ZOCOR    30    1 TABLET AT BEDTIME       * Notice:  This list has 2 medication(s) that are the same as other medications prescribed for you. Read the directions carefully, and ask your doctor or other care provider to review them with you.

## 2017-03-14 NOTE — PATIENT INSTRUCTIONS

## 2017-03-14 NOTE — TELEPHONE ENCOUNTER
Reason for call:  Patient reporting a symptom    Symptom or request: Norma, group home caregiver calling.  Pt has had a bad cough X 4 days - No fever, no SOB or other symptoms.  Group home staff member Norma needs to know if she should be seen in clinic?       Duration (how long have symptoms been present): 4 days    Have you been treated for this before? Yes    Additional comments:     Phone Number patient can be reached at:  Home number on file [phone] (home)    Best Time:  any    Can we leave a detailed message on this number:  YES    Call taken on 3/14/2017 at 12:30 PM by Shauna Kent

## 2017-03-14 NOTE — NURSING NOTE
"Chief Complaint   Patient presents with     URI     Symptoms for 4-5 days of coughing (rattle like sound), congestion. Slight temperature.       Initial /70  Pulse 74  Temp 98.8  F (37.1  C) (Tympanic)  Resp 16  Ht 5' 6.5\" (1.689 m)  Wt 166 lb (75.3 kg)  SpO2 95%  BMI 26.39 kg/m2 Estimated body mass index is 26.39 kg/(m^2) as calculated from the following:    Height as of this encounter: 5' 6.5\" (1.689 m).    Weight as of this encounter: 166 lb (75.3 kg).  Medication Reconciliation: complete  "

## 2017-03-14 NOTE — TELEPHONE ENCOUNTER
appt was made with provider in clinic for Wed 3/15/17 at 2:40 pm   Staff verbalized understanding and had no further questions at this time.   Encounter closed.   Albertina HUGHES RN

## 2017-04-26 ENCOUNTER — TELEPHONE (OUTPATIENT)
Dept: FAMILY MEDICINE | Facility: CLINIC | Age: 72
End: 2017-04-26

## 2017-04-26 NOTE — TELEPHONE ENCOUNTER
Panel Management Review      Patient has the following on her problem list:     Depression / Dysthymia review  PHQ-9 SCORE 7/28/2014 4/6/2016 3/1/2017   Total Score 2 - -   Total Score - 0 2      Patient is due for:  DAP      Composite cancer screening  Chart review shows that this patient is due/due soon for the following Mammogram and Colonoscopy  Summary:    Patient is due/failing the following:   COLONOSCOPY, DAP and MAMMOGRAM    Action needed:    will do AAP-  Patient still declines H.M. Do not call     Type of outreach:    Sent letter.    Questions for provider review:    None                                                                                                                                    Laurence Whitfield CMA       Chart routed to  none .

## 2017-04-26 NOTE — LETTER
My Asthma Action Plan  Name: Loraine Aviles   YOB: 1945  Date: 4/26/2017   My doctor: Carlos Servin MD   My clinic: [facility]        My Control Medicine:  My Rescue Medicine:   My Asthma Severity: intermittent  Avoid your asthma triggers: upper respiratory infections               GREEN ZONE     Good Control    I feel good    No cough or wheeze    Can work, sleep and play without asthma symptoms       Take your asthma control medicine every day.     1. If exercise triggers your asthma, take your rescue medication    15 minutes before exercise or sports, and    During exercise if you have asthma symptoms  2. Spacer to use with inhaler: If you have a spacer, make sure to use it with your inhaler             YELLOW ZONE     Getting Worse  I have ANY of these:    I do not feel good    Cough or wheeze    Chest feels tight    Wake up at night   1. Keep taking your Green Zone medications  2. Start taking your rescue medicine:    every 20 minutes for up to 1 hour. Then every 4 hours for 24-48 hours.  3. If you stay in the Yellow Zone for more than 12-24 hours, contact your doctor.  4. If you do not return to the Green Zone in 12-24 hours or you get worse, start taking your oral steroid medicine if prescribed by your provider.           RED ZONE     Medical Alert - Get Help  I have ANY of these:    I feel awful    Medicine is not helping    Breathing getting harder    Trouble walking or talking    Nose opens wide to breathe       1. Take your rescue medicine NOW  2. If your provider has prescribed an oral steroid medicine, start taking it NOW  3. Call your doctor NOW  4. If you are still in the Red Zone after 20 minutes and you have not reached your doctor:    Take your rescue medicine again and    Call 911 or go to the emergency room right away    See your regular doctor within 2 weeks of an Emergency Room or Urgent Care visit for follow-up treatment.        Electronically signed by:  Akanksha Whitfield, April 26, 2017    Annual Reminders:  Meet with Asthma Educator,  Flu Shot in the Fall, consider Pneumonia Vaccination for patients with asthma (aged 19 and older).    Pharmacy: [facility] - [facility] [address]                    Asthma Triggers  How To Control Things That Make Your Asthma Worse    Triggers are things that make your asthma worse.  Look at the list below to help you find your triggers and what you can do about them.  You can help prevent asthma flare-ups by staying away from your triggers.      Trigger                                                          What you can do   Cigarette Smoke  Tobacco smoke can make asthma worse. Do not allow smoking in your home, car or around you.  Be sure no one smokes at a child s day care or school.  If you smoke, ask your health care provider for ways to help you quit.  Ask family members to quit too.  Ask your health care provider for a referral to Quit Plan to help you quit smoking, or call 4-145-737-PLAN.     Colds, Flu, Bronchitis  These are common triggers of asthma. Wash your hands often.  Don t touch your eyes, nose or mouth.  Get a flu shot every year.     Dust Mites  These are tiny bugs that live in cloth or carpet. They are too small to see. Wash sheets and blankets in hot water every week.   Encase pillows and mattress in dust mite proof covers.  Avoid having carpet if you can. If you have carpet, vacuum weekly.   Use a dust mask and HEPA vacuum.   Pollen and Outdoor Mold  Some people are allergic to trees, grass, or weed pollen, or molds. Try to keep your windows closed.  Limit time out doors when pollen count is high.   Ask you health care provider about taking medicine during allergy season.     Animal Dander  Some people are allergic to skin flakes, urine or saliva from pets with fur or feathers. Keep pets with fur or feathers out of your home.    If you can t keep the pet outdoors, then keep the pet out  of your bedroom.  Keep the bedroom door closed.  Keep pets off cloth furniture and away from stuffed toys.     Mice, Rats, and Cockroaches  Some people are allergic to the waste from these pests.   Cover food and garbage.  Clean up spills and food crumbs.  Store grease in the refrigerator.   Keep food out of the bedroom.   Indoor Mold  This can be a trigger if your home has high moisture. Fix leaking faucets, pipes, or other sources of water.   Clean moldy surfaces.  Dehumidify basement if it is damp and smelly.   Smoke, Strong Odors, and Sprays  These can reduce air quality. Stay away from strong odors and sprays, such as perfume, powder, hair spray, paints, smoke incense, paint, cleaning products, candles and new carpet.   Exercise or Sports  Some people with asthma have this trigger. Be active!  Ask your doctor about taking medicine before sports or exercise to prevent symptoms.    Warm up for 5-10 minutes before and after sports or exercise.     Other Triggers of Asthma  Cold air:  Cover your nose and mouth with a scarf.  Sometimes laughing or crying can be a trigger.  Some medicines and food can trigger asthma.

## 2017-05-22 ENCOUNTER — TELEPHONE (OUTPATIENT)
Dept: FAMILY MEDICINE | Facility: CLINIC | Age: 72
End: 2017-05-22

## 2017-05-22 NOTE — TELEPHONE ENCOUNTER
Panel Management Review      Patient has the following on her problem list: None      Composite cancer screening  Chart review shows that this patient is due/due soon for the following Mammogram and Colonoscopy  Summary:    Patient is due/failing the following:   COLONOSCOPY and MAMMOGRAM    Action needed:     DO NOT CALL DECLINES     Type of outreach:    Tried to call     Questions for provider review:    None                                                                                                                                    Laurence Whitfield CMA       Chart routed to  None  .

## 2017-06-20 ENCOUNTER — OFFICE VISIT (OUTPATIENT)
Dept: FAMILY MEDICINE | Facility: CLINIC | Age: 72
End: 2017-06-20
Payer: MEDICARE

## 2017-06-20 VITALS
TEMPERATURE: 98.3 F | SYSTOLIC BLOOD PRESSURE: 133 MMHG | RESPIRATION RATE: 18 BRPM | DIASTOLIC BLOOD PRESSURE: 71 MMHG | WEIGHT: 162 LBS | BODY MASS INDEX: 25.43 KG/M2 | HEIGHT: 67 IN | HEART RATE: 76 BPM

## 2017-06-20 DIAGNOSIS — E87.6 HYPOKALEMIA: ICD-10-CM

## 2017-06-20 DIAGNOSIS — Z00.00 ROUTINE HISTORY AND PHYSICAL EXAMINATION OF ADULT: Primary | ICD-10-CM

## 2017-06-20 DIAGNOSIS — R62.50 DELAY IN DEVELOPMENT: ICD-10-CM

## 2017-06-20 DIAGNOSIS — Z23 ENCOUNTER FOR IMMUNIZATION: ICD-10-CM

## 2017-06-20 DIAGNOSIS — E78.00 PURE HYPERCHOLESTEROLEMIA: ICD-10-CM

## 2017-06-20 DIAGNOSIS — M81.0 OSTEOPOROSIS: ICD-10-CM

## 2017-06-20 DIAGNOSIS — E78.5 HYPERLIPIDEMIA LDL GOAL <130: ICD-10-CM

## 2017-06-20 DIAGNOSIS — R60.9 EDEMA, UNSPECIFIED TYPE: ICD-10-CM

## 2017-06-20 DIAGNOSIS — G40.909 SEIZURE DISORDER (H): ICD-10-CM

## 2017-06-20 PROCEDURE — 86580 TB INTRADERMAL TEST: CPT | Performed by: FAMILY MEDICINE

## 2017-06-20 PROCEDURE — 36415 COLL VENOUS BLD VENIPUNCTURE: CPT | Performed by: FAMILY MEDICINE

## 2017-06-20 PROCEDURE — G0439 PPPS, SUBSEQ VISIT: HCPCS | Performed by: FAMILY MEDICINE

## 2017-06-20 PROCEDURE — 80048 BASIC METABOLIC PNL TOTAL CA: CPT | Performed by: FAMILY MEDICINE

## 2017-06-20 PROCEDURE — 84460 ALANINE AMINO (ALT) (SGPT): CPT | Performed by: FAMILY MEDICINE

## 2017-06-20 PROCEDURE — 80156 ASSAY CARBAMAZEPINE TOTAL: CPT | Performed by: FAMILY MEDICINE

## 2017-06-20 PROCEDURE — 80165 DIPROPYLACETIC ACID FREE: CPT | Mod: 90 | Performed by: FAMILY MEDICINE

## 2017-06-20 PROCEDURE — 84450 TRANSFERASE (AST) (SGOT): CPT | Performed by: FAMILY MEDICINE

## 2017-06-20 PROCEDURE — 85025 COMPLETE CBC W/AUTO DIFF WBC: CPT | Performed by: FAMILY MEDICINE

## 2017-06-20 RX ORDER — ALENDRONATE SODIUM 70 MG/1
70 TABLET ORAL WEEKLY
Qty: 12 TABLET | Refills: 3 | Status: SHIPPED | OUTPATIENT
Start: 2017-06-20 | End: 2018-05-22

## 2017-06-20 RX ORDER — SIMVASTATIN 20 MG
TABLET ORAL
Qty: 90 TABLET | Refills: 3 | Status: SHIPPED | OUTPATIENT
Start: 2017-06-20 | End: 2018-05-22

## 2017-06-20 RX ORDER — FUROSEMIDE 40 MG
40 TABLET ORAL DAILY
Qty: 90 TABLET | Refills: 3 | Status: SHIPPED | OUTPATIENT
Start: 2017-06-20 | End: 2018-05-22

## 2017-06-20 RX ORDER — POTASSIUM CHLORIDE 1500 MG/1
20 TABLET, EXTENDED RELEASE ORAL DAILY
Qty: 90 TABLET | Refills: 3 | Status: SHIPPED | OUTPATIENT
Start: 2017-06-20 | End: 2018-05-22

## 2017-06-20 NOTE — PROGRESS NOTES
SUBJECTIVE:                                                            Loraine Aviles is a 71 year old female who presents for Preventive Visit.      Are you in the first 12 months of your Medicare Part B coverage?  No    Healthy Habits:    Do you get at least three servings of calcium containing foods daily (dairy, green leafy vegetables, etc.)? yes    Amount of exercise or daily activities, outside of work: 5 day(s) per week    Problems taking medications regularly No    Medication side effects: No    Have you had an eye exam in the past two years? yes    Do you see a dentist twice per year? yes    Do you have sleep apnea, excessive snoring or daytime drowsiness?no    COGNITIVE SCREENING:  Not appropriate due to mental handicap        Hyperlipidemia Follow-Up      Rate your low fat/cholesterol diet?: good    Taking statin?  No    Other lipid medications/supplements?:  none     Hypertension Follow-up      Outpatient blood pressures are not being checked.    Low Salt Diet: no added salt       Reviewed and updated as needed this visit by clinical staff  Tobacco  Allergies  Meds         Reviewed and updated as needed this visit by Provider        Social History   Substance Use Topics     Smoking status: Never Smoker     Smokeless tobacco: Never Used     Alcohol use No       The patient does not drink >3 drinks per day nor >7 drinks per week.    Today's PHQ-2 Score:   PHQ-2 ( 1999 Pfizer) 6/20/2017 4/6/2016   Q1: Little interest or pleasure in doing things 0 0   Q2: Feeling down, depressed or hopeless 0 0   PHQ-2 Score 0 0       Do you feel safe in your environment - yes     Do you have a Health Care Directive?: Yes: Advance Directive has been received and scanned.    Current providers sharing in care for this patient include:   Patient Care Team:  Carlos Servin MD as PCP - General (Family Practice)      Hearing impairment: No    Ability to successfully perform activities of daily living: Yes, no assistance  "needed     Fall risk:  Fallen 2 or more times in the past year?: No  Any fall with injury in the past year?: No    Home safety:  none identified      The following health maintenance items are reviewed in Epic and correct as of today:  Health Maintenance   Topic Date Due     MEDICARE ANNUAL WELLNESS VISIT  07/09/1963     DEPRESSION ACTION PLAN Q1 YR  04/06/2017     INFLUENZA VACCINE (SYSTEM ASSIGNED)  09/01/2017     PHQ-9 Q6 MONTHS  09/01/2017     FALL RISK ASSESSMENT  03/01/2018     MAMMO SCREEN Q2 YR (SYSTEM ASSIGNED)  04/06/2018     ADVANCE DIRECTIVE PLANNING Q5 YRS  09/10/2020     LIPID SCREEN Q5 YR FEMALE (SYSTEM ASSIGNED)  04/06/2021     COLON CANCER SCREEN (SYSTEM ASSIGNED)  09/25/2022     TETANUS IMMUNIZATION (SYSTEM ASSIGNED)  03/26/2023     DEXA SCAN SCREENING (SYSTEM ASSIGNED)  Completed     PNEUMOCOCCAL  Completed         Mammogram Screening: Patient over age 50, mutual decision to screen reflected in health maintenance.     ROS:  Constitutional, HEENT, cardiovascular, pulmonary, GI, , musculoskeletal, neuro, skin, endocrine and psych systems are negative, except as otherwise noted.    Problem list, Medication list, Allergies, and Medical/Social/Surgical histories reviewed in [facility] and updated as appropriate.  OBJECTIVE:                                                            /71  Pulse 76  Temp 98.3  F (36.8  C)  Resp 18  Ht 5' 6.5\" (1.689 m)  Wt 162 lb (73.5 kg)  BMI 25.76 kg/m2 Estimated body mass index is 25.76 kg/(m^2) as calculated from the following:    Height as of this encounter: 5' 6.5\" (1.689 m).    Weight as of this encounter: 162 lb (73.5 kg).  EXAM:   GENERAL APPEARANCE: healthy, alert and no distress  EYES: Eyes grossly normal to inspection, PERRL and conjunctivae and sclerae normal  HENT: ear canals and TM's normal, nose and mouth without ulcers or lesions, oropharynx clear and oral mucous membranes moist  NECK: no adenopathy, no asymmetry, masses, or scars and thyroid " normal to palpation  RESP: lungs clear to auscultation - no rales, rhonchi or wheezes  BREAST: normal without masses, tenderness or nipple discharge and no palpable axillary masses or adenopathy  CV: regular rate and rhythm, normal S1 S2, no S3 or S4, no murmur, click or rub, no peripheral edema and peripheral pulses strong  ABDOMEN: soft, nontender, no hepatosplenomegaly, no masses and bowel sounds normal  MS: no musculoskeletal defects are noted and gait is age appropriate without ataxia  SKIN: no suspicious lesions or rashes  NEURO: Normal strength and tone, sensory exam grossly normal, mentation intact and speech normal  PSYCH: mentation appears normal and affect normal/bright    ASSESSMENT / PLAN:                                                            Loraine was seen today for physical.    Diagnoses and all orders for this visit:    Routine history and physical examination of adult  -     Basic metabolic panel  -     CBC with platelets differential    Hypokalemia  -     potassium chloride SA (POTASSIUM CHLORIDE) 20 MEQ CR tablet; Take 1 tablet (20 mEq) by mouth daily    Pure hypercholesterolemia  -     simvastatin (ZOCOR) 20 MG tablet; 1 TABLET AT BEDTIME    Seizure disorder (H)  -     AST  -     ALT  -     Carbamazepine total  -     Valproic Acid Free    Hyperlipidemia LDL goal <130    Edema, unspecified type  -     furosemide (LASIX) 40 MG tablet; Take 1 tablet (40 mg) by mouth daily    Delay in development    Osteoporosis  -     alendronate (FOSAMAX) 70 MG tablet; Take 1 tablet (70 mg) by mouth once a week    Encounter for immunization  -     TB INTRADERMAL TEST  -     ADMIN 1st VACCINE        End of Life Planning:  Patient currently has an advanced directive:     COUNSELING:  Reviewed preventive health counseling, as reflected in patient instructions       Regular exercise       Healthy diet/nutrition        Estimated body mass index is 25.76 kg/(m^2) as calculated from the following:    Height as of  "this encounter: 5' 6.5\" (1.689 m).    Weight as of this encounter: 162 lb (73.5 kg).     reports that she has never smoked. She has never used smokeless tobacco.      Appropriate preventive services were discussed with this patient, including applicable screening as appropriate for cardiovascular disease, diabetes, osteopenia/osteoporosis, and glaucoma.  As appropriate for age/gender, discussed screening for colorectal cancer, prostate cancer, breast cancer, and cervical cancer. Checklist reviewing preventive services available has been given to the patient.    Reviewed patients plan of care and provided an AVS. The Intermediate Care Plan ( asthma action plan, low back pain action plan, and migraine action plan) for Loraine meets the Care Plan requirement. This Care Plan has been established and reviewed with the Patient.    Counseling Resources:  ATP IV Guidelines  Pooled Cohorts Equation Calculator  Breast Cancer Risk Calculator  FRAX Risk Assessment  ICSI Preventive Guidelines  Dietary Guidelines for Americans, 2010  USDA's MyPlate  ASA Prophylaxis  Lung CA Screening    Carlos Servin MD  [facility]  "

## 2017-06-20 NOTE — MR AVS SNAPSHOT
After Visit Summary   6/20/2017    Loraine Aviles    MRN: [id number]           Patient Information     Date Of Birth          1945        Visit Information        Provider Department      6/20/2017 1:00 PM Carlos Servin MD [facility]        Today's Diagnoses     Routine history and physical examination of adult    -  1    Hypokalemia        Pure hypercholesterolemia        Seizure disorder (H)        Hyperlipidemia LDL goal <130        Edema, unspecified type        Delay in development        Osteoporosis        Encounter for immunization          Care Instructions      Preventive Health Recommendations    Female Ages 65 +    Yearly exam:     See your health care provider every year in order to  o Review health changes.   o Discuss preventive care.    o Review your medicines if your doctor has prescribed any.      You no longer need a yearly Pap test unless you've had an abnormal Pap test in the past 10 years. If you have vaginal symptoms, such as bleeding or discharge, be sure to talk with your provider about a Pap test.      Every 1 to 2 years, have a mammogram.  If you are over 69, talk with your health care provider about whether or not you want to continue having screening mammograms.      Every 10 years, have a colonoscopy. Or, have a yearly FIT test (stool test). These exams will check for colon cancer.       Have a cholesterol test every 5 years, or more often if your doctor advises it.       Have a diabetes test (fasting glucose) every three years. If you are at risk for diabetes, you should have this test more often.       At age 65, have a bone density scan (DEXA) to check for osteoporosis (brittle bone disease).    Shots:    Get a flu shot each year.    Get a tetanus shot every 10 years.    Talk to your doctor about your pneumonia vaccines. There are now two you should receive - Pneumovax (PPSV 23) and Prevnar (PCV 13).    Talk to your doctor about the shingles  "vaccine.    Talk to your doctor about the hepatitis B vaccine.    Nutrition:     Eat at least 5 servings of fruits and vegetables each day.      Eat whole-grain bread, whole-wheat pasta and brown rice instead of white grains and rice.      Talk to your provider about Calcium and Vitamin D.     Lifestyle    Exercise at least 150 minutes a week (30 minutes a day, 5 days a week). This will help you control your weight and prevent disease.      Limit alcohol to one drink per day.      No smoking.       Wear sunscreen to prevent skin cancer.       See your dentist twice a year for an exam and cleaning.      See your eye doctor every 1 to 2 years to screen for conditions such as glaucoma, macular degeneration and cataracts.          Follow-ups after your visit        Who to contact     If you have questions or need follow up information about today's clinic visit or your schedule please contact [facility] directly at [phone].  Normal or non-critical lab and imaging results will be communicated to you by MyChart, letter or phone within 4 business days after the clinic has received the results. If you do not hear from us within 7 days, please contact the clinic through [employer] or phone. If you have a critical or abnormal lab result, we will notify you by phone as soon as possible.  Submit refill requests through [employer] or call your pharmacy and they will forward the refill request to us. Please allow 3 business days for your refill to be completed.          Additional Information About Your Visit        [employer]"" Information     [employer] lets you send messages to your doctor, view your test results, renew your prescriptions, schedule appointments and more. To sign up, go to [email or web address] . Click on \"Log in\" on the left side of the screen, which will take you to the Welcome page. Then click on \"Sign up Now\" on the right side of the page.     You will be asked to enter the access code listed " "below, as well as some personal information. Please follow the directions to create your username and password.     Your access code is: [email or web address]  Expires: 2017  1:53 PM     Your access code will  in 90 days. If you need help or a new code, please call your [facility] or [phone].        Care EveryWhere ID     This is your Care EveryWhere ID. This could be used by other organizations to access your [place] medical records  [id number]        Your Vitals Were     Pulse Temperature Respirations Height BMI (Body Mass Index)       76 98.3  F (36.8  C) 18 5' 6.5\" (1.689 m) 25.76 kg/m2        Blood Pressure from Last 3 Encounters:   17 133/71   17 130/70   02/10/17 142/81    Weight from Last 3 Encounters:   17 162 lb (73.5 kg)   17 166 lb (75.3 kg)   02/10/17 165 lb (74.8 kg)              We Performed the Following     ADMIN 1st VACCINE     ALT     AST     Basic metabolic panel     Carbamazepine total     CBC with platelets differential     TB INTRADERMAL TEST     Valproic Acid Free          Today's Medication Changes          These changes are accurate as of: 17  1:58 PM.  If you have any questions, ask your nurse or doctor.               These medicines have changed or have updated prescriptions.        Dose/Directions    alendronate 70 MG tablet   Commonly known as:  FOSAMAX   This may have changed:  See the new instructions.   Used for:  Osteoporosis   Changed by:  Carlos Servin MD        Dose:  70 mg   Take 1 tablet (70 mg) by mouth once a week   Quantity:  12 tablet   Refills:  3       simvastatin 20 MG tablet   Commonly known as:  ZOCOR   This may have changed:  See the new instructions.   Used for:  Pure hypercholesterolemia   Changed by:  Carlos Servin MD        1 TABLET AT BEDTIME   Quantity:  90 tablet   Refills:  3            Where to get your medicines      These medications were sent to [facility] - [place], [place] - [address] [address]  " [address] [facility] [address]     Phone:  [phone]     alendronate 70 MG tablet    furosemide 40 MG tablet    potassium chloride SA 20 MEQ CR tablet    simvastatin 20 MG tablet                Primary Care Provider Office Phone # Fax #    Carlos Servin [phone]       [facility] [address] [address] [facility] [address]        Thank you!     Thank you for choosing [facility]  for your care. Our goal is always to provide you with excellent care. Hearing back from our patients is one way we can continue to improve our services. Please take a few minutes to complete the written survey that you may receive in the mail after your visit with us. Thank you!             Your Updated Medication List - Protect others around you: Learn how to safely use, store and throw away your medicines at www.disposemymeds.org.          This list is accurate as of: 6/20/17  1:58 PM.  Always use your most recent med list.                   Brand Name Dispense Instructions for use    acetaminophen 500 MG tablet    TYLENOL    360 tablet    Take 2 tablets by mouth 3 times daily.       alendronate 70 MG tablet    FOSAMAX    12 tablet    Take 1 tablet (70 mg) by mouth once a week       CALCIUM 500 + D PO      1 TABLET ORALLY 3 TIMES DAILY       carBAMazepine 200 MG tablet    TEGretol     Take 300 mg by mouth 2 times daily 200 mg in Am and 300 mg in the PM       CERTAGEN Tabs     30 tablet    Take 1 tablet by mouth daily       DEPAKOTE 500 MG EC tablet   Generic drug:  divalproex     120 tablet    Take 1 tablet by mouth 3 times daily.       * FLUoxetine HCl (PMDD) 10 MG Tabs      Take 10 mg by mouth daily       * FLUoxetine HCl (PMDD) 20 MG Caps      Take 20 mg by mouth daily       furosemide 40 MG tablet    LASIX    90 tablet    Take 1 tablet (40 mg) by mouth daily       guaiFENesin 200 MG Tabs tablet    ORGANIDIN    60 tablet    Take 2 tablets (400 mg) by mouth every 6 hours as  needed for cough       ibuprofen 200 MG tablet    ADVIL/MOTRIN     Take 200 mg by mouth every 4 hours as needed for mild pain       potassium chloride SA 20 MEQ CR tablet    potassium chloride    90 tablet    Take 1 tablet (20 mEq) by mouth daily       simvastatin 20 MG tablet    ZOCOR    90 tablet    1 TABLET AT BEDTIME       * Notice:  This list has 2 medication(s) that are the same as other medications prescribed for you. Read the directions carefully, and ask your doctor or other care provider to review them with you.

## 2017-06-21 LAB
ALT SERPL W P-5'-P-CCNC: 19 U/L (ref 0–50)
ANION GAP SERPL CALCULATED.3IONS-SCNC: 7 MMOL/L (ref 3–14)
AST SERPL W P-5'-P-CCNC: 15 U/L (ref 0–45)
BASOPHILS # BLD AUTO: 0 10E9/L (ref 0–0.2)
BASOPHILS NFR BLD AUTO: 0.8 %
BUN SERPL-MCNC: 12 MG/DL (ref 7–30)
CALCIUM SERPL-MCNC: 9.3 MG/DL (ref 8.5–10.1)
CARBAMAZEPINE SERPL-MCNC: 8.3 MG/L (ref 4–12)
CHLORIDE SERPL-SCNC: 102 MMOL/L (ref 94–109)
CO2 SERPL-SCNC: 31 MMOL/L (ref 20–32)
CREAT SERPL-MCNC: 0.6 MG/DL (ref 0.52–1.04)
DIFFERENTIAL METHOD BLD: NORMAL
EOSINOPHIL # BLD AUTO: 0.1 10E9/L (ref 0–0.7)
EOSINOPHIL NFR BLD AUTO: 1.5 %
ERYTHROCYTE [DISTWIDTH] IN BLOOD BY AUTOMATED COUNT: 11.7 % (ref 10–15)
GFR SERPL CREATININE-BSD FRML MDRD: NORMAL ML/MIN/1.7M2
GLUCOSE SERPL-MCNC: 94 MG/DL (ref 70–99)
HCT VFR BLD AUTO: 44.2 % (ref 35–47)
HGB BLD-MCNC: 15.1 G/DL (ref 11.7–15.7)
IMM GRANULOCYTES # BLD: 0 10E9/L (ref 0–0.4)
IMM GRANULOCYTES NFR BLD: 0.2 %
LYMPHOCYTES # BLD AUTO: 1.8 10E9/L (ref 0.8–5.3)
LYMPHOCYTES NFR BLD AUTO: 33.8 %
MCH RBC QN AUTO: 32.8 PG (ref 26.5–33)
MCHC RBC AUTO-ENTMCNC: 34.2 G/DL (ref 31.5–36.5)
MCV RBC AUTO: 96 FL (ref 78–100)
MONOCYTES # BLD AUTO: 0.7 10E9/L (ref 0–1.3)
MONOCYTES NFR BLD AUTO: 14.1 %
NEUTROPHILS # BLD AUTO: 2.6 10E9/L (ref 1.6–8.3)
NEUTROPHILS NFR BLD AUTO: 49.6 %
PLATELET # BLD AUTO: 179 10E9/L (ref 150–450)
PLATELET # BLD EST: NORMAL 10*3/UL
POTASSIUM SERPL-SCNC: 4.4 MMOL/L (ref 3.4–5.3)
RBC # BLD AUTO: 4.6 10E12/L (ref 3.8–5.2)
RBC MORPH BLD: NORMAL
SODIUM SERPL-SCNC: 140 MMOL/L (ref 133–144)
WBC # BLD AUTO: 5.2 10E9/L (ref 4–11)

## 2017-06-22 ENCOUNTER — ALLIED HEALTH/NURSE VISIT (OUTPATIENT)
Dept: FAMILY MEDICINE | Facility: CLINIC | Age: 72
End: 2017-06-22
Payer: MEDICARE

## 2017-06-22 DIAGNOSIS — Z11.1 VISIT FOR MANTOUX TEST: Primary | ICD-10-CM

## 2017-06-22 LAB
PPDINDURATION: 0 MM (ref 0–5)
PPDREDNESS: 0 MM
VALPROATE FREE SERPL-MCNC: 8.6 UG/ML

## 2017-06-22 PROCEDURE — 99207 ZZC NO CHARGE NURSE ONLY: CPT

## 2017-06-22 NOTE — MR AVS SNAPSHOT
"              After Visit Summary   2017    Loraine Aviles    MRN: [id number]           Patient Information     Date Of Birth          1945        Visit Information        Provider Department      2017 2:00 PM [place] CL RN [facility]        Today's Diagnoses     Visit for Mantoux test    -  1       Follow-ups after your visit        Who to contact     If you have questions or need follow up information about today's clinic visit or your schedule please contact [facility] directly at [phone].  Normal or non-critical lab and imaging results will be communicated to you by [employer], letter or phone within 4 business days after the clinic has received the results. If you do not hear from us within 7 days, please contact the clinic through [employer] or phone. If you have a critical or abnormal lab result, we will notify you by phone as soon as possible.  Submit refill requests through [facility] or call your pharmacy and they will forward the refill request to us. Please allow 3 business days for your refill to be completed.          Additional Information About Your Visit        MyChart Information     [facility] lets you send messages to your doctor, view your test results, renew your prescriptions, schedule appointments and more. To sign up, go to [email or web address] . Click on \"Log in\" on the left side of the screen, which will take you to the Welcome page. Then click on \"Sign up Now\" on the right side of the page.     You will be asked to enter the access code listed below, as well as some personal information. Please follow the directions to create your username and password.     Your access code is: [email or web address]  Expires: 2017  1:53 PM     Your access code will  in 90 days. If you need help or a new code, please call your [facility] or [phone].        Care EveryWhere ID     This is your Care EveryWhere ID. This could be used by other " organizations to access your [place] medical records  [id number]         Blood Pressure from Last 3 Encounters:   06/20/17 133/71   03/14/17 130/70   02/10/17 142/81    Weight from Last 3 Encounters:   06/20/17 162 lb (73.5 kg)   03/14/17 166 lb (75.3 kg)   02/10/17 165 lb (74.8 kg)              Today, you had the following     No orders found for display       Primary Care Provider Office Phone # Fax #    Carlos Servin [phone]       [facility] [address] [address] [facility] [address]        Equal Access to Services     JOCELYN ARELLANO : Hadii chris regano Sojuve, waaxda luqadaha, qaybta kaalmada adeegyada, erick west. So [facility] [phone].    ATENCIÓN: Si habla español, tiene a grullon disposición servicios gratuitos de asistencia lingüística. Llame al [phone].    We comply with applicable federal civil rights laws and [place] laws. We do not discriminate on the basis of race, color, national origin, age, disability sex, sexual orientation or gender identity.            Thank you!     Thank you for choosing [facility]  for your care. Our goal is always to provide you with excellent care. Hearing back from our patients is one way we can continue to improve our services. Please take a few minutes to complete the written survey that you may receive in the mail after your visit with us. Thank you!             Your Updated Medication List - Protect others around you: Learn how to safely use, store and throw away your medicines at www.disposemymeds.org.          This list is accurate as of: 6/22/17  2:13 PM.  Always use your most recent med list.                   Brand Name Dispense Instructions for use Diagnosis    acetaminophen 500 MG tablet    TYLENOL    360 tablet    Take 2 tablets by mouth 3 times daily.    Hip pain       alendronate 70 MG tablet    FOSAMAX    12 tablet    Take 1 tablet (70 mg) by mouth once a week     Osteoporosis       CALCIUM 500 + D PO      1 TABLET ORALLY 3 TIMES DAILY        carBAMazepine 200 MG tablet    TEGretol     Take 300 mg by mouth 2 times daily 200 mg in Am and 300 mg in the PM        CERTAGEN Tabs     30 tablet    Take 1 tablet by mouth daily        DEPAKOTE 500 MG EC tablet   Generic drug:  divalproex     120 tablet    Take 1 tablet by mouth 3 times daily.    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, without mention of intractable epilepsy       * FLUoxetine HCl (PMDD) 10 MG Tabs      Take 10 mg by mouth daily        * FLUoxetine HCl (PMDD) 20 MG Caps      Take 20 mg by mouth daily        furosemide 40 MG tablet    LASIX    90 tablet    Take 1 tablet (40 mg) by mouth daily    Edema, unspecified type       guaiFENesin 200 MG Tabs tablet    ORGANIDIN    60 tablet    Take 2 tablets (400 mg) by mouth every 6 hours as needed for cough    Acute viral bronchitis, Cough       ibuprofen 200 MG tablet    ADVIL/MOTRIN     Take 200 mg by mouth every 4 hours as needed for mild pain        potassium chloride SA 20 MEQ CR tablet    potassium chloride    90 tablet    Take 1 tablet (20 mEq) by mouth daily    Hypokalemia       simvastatin 20 MG tablet    ZOCOR    90 tablet    1 TABLET AT BEDTIME    Pure hypercholesterolemia       * Notice:  This list has 2 medication(s) that are the same as other medications prescribed for you. Read the directions carefully, and ask your doctor or other care provider to review them with you.

## 2017-06-22 NOTE — LETTER
[facility]  [id number] [place] [address]  [address]  Phone: [phone]  Fax: [phone]            6/22/17              Loraine Stefan was seen today to read her Mantoux.  Results were 0 induration, 0 redness, negative result.            KPavelRYEE

## 2017-07-07 ENCOUNTER — MEDICAL CORRESPONDENCE (OUTPATIENT)
Dept: HEALTH INFORMATION MANAGEMENT | Facility: CLINIC | Age: 72
End: 2017-07-07

## 2017-11-22 ENCOUNTER — MEDICAL CORRESPONDENCE (OUTPATIENT)
Dept: HEALTH INFORMATION MANAGEMENT | Facility: CLINIC | Age: 72
End: 2017-11-22

## 2017-11-28 ENCOUNTER — ALLIED HEALTH/NURSE VISIT (OUTPATIENT)
Dept: FAMILY MEDICINE | Facility: CLINIC | Age: 72
End: 2017-11-28
Payer: MEDICARE

## 2017-11-28 DIAGNOSIS — Z23 NEED FOR PROPHYLACTIC VACCINATION AND INOCULATION AGAINST INFLUENZA: Primary | ICD-10-CM

## 2017-11-28 PROCEDURE — G0008 ADMIN INFLUENZA VIRUS VAC: HCPCS

## 2017-11-28 PROCEDURE — 99207 ZZC NO CHARGE NURSE ONLY: CPT

## 2017-11-28 PROCEDURE — 90662 IIV NO PRSV INCREASED AG IM: CPT

## 2017-11-28 NOTE — PROGRESS NOTES

## 2017-11-28 NOTE — MR AVS SNAPSHOT
"              After Visit Summary   2017    Loraine Aviles    MRN: [id number]           Patient Information     Date Of Birth          1945        Visit Information        Provider Department      2017 1:00 PM Walter/Daphne Olvera [facility]        Today's Diagnoses     Need for prophylactic vaccination and inoculation against influenza    -  1       Follow-ups after your visit        Who to contact     If you have questions or need follow up information about today's clinic visit or your schedule please contact [facility] directly at [phone].  Normal or non-critical lab and imaging results will be communicated to you by [employer] websitehart, letter or phone within 4 business days after the clinic has received the results. If you do not hear from us within 7 days, please contact the clinic through [employer] websitehart or phone. If you have a critical or abnormal lab result, we will notify you by phone as soon as possible.  Submit refill requests through [employer] or call your pharmacy and they will forward the refill request to us. Please allow 3 business days for your refill to be completed.          Additional Information About Your Visit        MyChart Information     [employer]Loop lets you send messages to your doctor, view your test results, renew your prescriptions, schedule appointments and more. To sign up, go to [email or web address] . Click on \"Log in\" on the left side of the screen, which will take you to the Welcome page. Then click on \"Sign up Now\" on the right side of the page.     You will be asked to enter the access code listed below, as well as some personal information. Please follow the directions to create your username and password.     Your access code is: [email or web address]  Expires: 2018  1:16 PM     Your access code will  in 90 days. If you need help or a new code, please call your [facility] or [phone].        Care EveryWhere ID     This is your Care " EveryWhere ID. This could be used by other organizations to access your [place] medical records  [id number]         Blood Pressure from Last 3 Encounters:   06/20/17 133/71   03/14/17 130/70   02/10/17 142/81    Weight from Last 3 Encounters:   06/20/17 162 lb (73.5 kg)   03/14/17 166 lb (75.3 kg)   02/10/17 165 lb (74.8 kg)              We Performed the Following     ADMIN INFLUENZA (For MEDICARE Patients ONLY) []     FLU VACCINE, INCREASED ANTIGEN, PRESV FREE, AGE 65+ [41338]        Primary Care Provider Office Phone # Fax #    Carlos Servin [phone] [address] [facility] [address]        Equal Access to Services     JOCELYN ARELLANO : Hadii chris rodriguez hadasho Soomaali, waaxda luqadaha, qaybta kaalmada adeegyada, erick recinos . So [facility] [phone].    ATENCIÓN: Si habla español, tiene a grullon disposición servicios gratuitos de asistencia lingüística. Llame al [phone].    We comply with applicable federal civil rights laws and [place] laws. We do not discriminate on the basis of race, color, national origin, age, disability, sex, sexual orientation, or gender identity.            Thank you!     Thank you for choosing [facility]  for your care. Our goal is always to provide you with excellent care. Hearing back from our patients is one way we can continue to improve our services. Please take a few minutes to complete the written survey that you may receive in the mail after your visit with us. Thank you!             Your Updated Medication List - Protect others around you: Learn how to safely use, store and throw away your medicines at www.disposemymeds.org.          This list is accurate as of: 11/28/17  1:16 PM.  Always use your most recent med list.                   Brand Name Dispense Instructions for use Diagnosis    acetaminophen 500 MG tablet    TYLENOL    360 tablet    Take 2 tablets by mouth 3 times daily.    Hip pain        alendronate 70 MG tablet    FOSAMAX    12 tablet    Take 1 tablet (70 mg) by mouth once a week    Osteoporosis       CALCIUM 500 + D PO      1 TABLET ORALLY 3 TIMES DAILY        carBAMazepine 200 MG tablet    TEGretol     Take 300 mg by mouth 2 times daily 200 mg in Am and 300 mg in the PM        CERTAGEN Tabs     30 tablet    Take 1 tablet by mouth daily        DEPAKOTE 500 MG EC tablet   Generic drug:  divalproex     120 tablet    Take 1 tablet by mouth 3 times daily.    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, without mention of intractable epilepsy       * FLUoxetine HCl (PMDD) 10 MG Tabs      Take 10 mg by mouth daily        * FLUoxetine HCl (PMDD) 20 MG Caps      Take 20 mg by mouth daily        furosemide 40 MG tablet    LASIX    90 tablet    Take 1 tablet (40 mg) by mouth daily    Edema, unspecified type       guaiFENesin 200 MG Tabs tablet    ORGANIDIN    60 tablet    Take 2 tablets (400 mg) by mouth every 6 hours as needed for cough    Acute viral bronchitis, Cough       ibuprofen 200 MG tablet    ADVIL/MOTRIN     Take 200 mg by mouth every 4 hours as needed for mild pain        potassium chloride SA 20 MEQ CR tablet    KLOR-CON    90 tablet    Take 1 tablet (20 mEq) by mouth daily    Hypokalemia       simvastatin 20 MG tablet    ZOCOR    90 tablet    1 TABLET AT BEDTIME    Pure hypercholesterolemia       * Notice:  This list has 2 medication(s) that are the same as other medications prescribed for you. Read the directions carefully, and ask your doctor or other care provider to review them with you.

## 2017-11-28 NOTE — NURSING NOTE
Prior to injection verified patient identity using patient's name and date of birth.  Per orders of Dr. Servin, injection of Influenza Vaccine given by Angeles Aviles CMA. Patient instructed to remain in clinic for 15 minutes afterwards, and to report any adverse reaction to me immediately.

## 2018-04-27 ENCOUNTER — TELEPHONE (OUTPATIENT)
Dept: FAMILY MEDICINE | Facility: CLINIC | Age: 73
End: 2018-04-27

## 2018-04-27 NOTE — TELEPHONE ENCOUNTER
Form returned from provider Physicians Order from [facility]  Sent to be scanned  Put out front for Paola to  from [facility].  Contacted Paola so she is aware.  Katiuska Ivey  Clinic Station  Flex

## 2018-04-30 DIAGNOSIS — G40.109 LOCALIZATION-RELATED FOCAL EPILEPSY WITH SIMPLE PARTIAL SEIZURES (H): ICD-10-CM

## 2018-05-01 RX ORDER — DIVALPROEX SODIUM 500 MG/1
TABLET, DELAYED RELEASE ORAL
Refills: 10 | OUTPATIENT
Start: 2018-05-01

## 2018-05-01 RX ORDER — CARBAMAZEPINE 200 MG/1
TABLET ORAL
Refills: 10 | OUTPATIENT
Start: 2018-05-01

## 2018-05-22 ENCOUNTER — OFFICE VISIT (OUTPATIENT)
Dept: FAMILY MEDICINE | Facility: CLINIC | Age: 73
End: 2018-05-22
Payer: MEDICARE

## 2018-05-22 ENCOUNTER — TELEPHONE (OUTPATIENT)
Dept: FAMILY MEDICINE | Facility: CLINIC | Age: 73
End: 2018-05-22

## 2018-05-22 VITALS
HEIGHT: 67 IN | HEART RATE: 65 BPM | WEIGHT: 172 LBS | DIASTOLIC BLOOD PRESSURE: 72 MMHG | TEMPERATURE: 97.7 F | BODY MASS INDEX: 27 KG/M2 | SYSTOLIC BLOOD PRESSURE: 124 MMHG

## 2018-05-22 DIAGNOSIS — E87.6 HYPOKALEMIA: ICD-10-CM

## 2018-05-22 DIAGNOSIS — R05.9 COUGH: ICD-10-CM

## 2018-05-22 DIAGNOSIS — M81.0 OSTEOPOROSIS, UNSPECIFIED OSTEOPOROSIS TYPE, UNSPECIFIED PATHOLOGICAL FRACTURE PRESENCE: ICD-10-CM

## 2018-05-22 DIAGNOSIS — Z00.00 ROUTINE GENERAL MEDICAL EXAMINATION AT A HEALTH CARE FACILITY: Primary | ICD-10-CM

## 2018-05-22 DIAGNOSIS — R60.9 EDEMA, UNSPECIFIED TYPE: ICD-10-CM

## 2018-05-22 DIAGNOSIS — E78.00 PURE HYPERCHOLESTEROLEMIA: ICD-10-CM

## 2018-05-22 DIAGNOSIS — F32.0 MILD MAJOR DEPRESSION (H): ICD-10-CM

## 2018-05-22 DIAGNOSIS — G40.909 SEIZURE DISORDER (H): ICD-10-CM

## 2018-05-22 DIAGNOSIS — J20.8 ACUTE VIRAL BRONCHITIS: ICD-10-CM

## 2018-05-22 LAB
CARBAMAZEPINE SERPL-MCNC: 8.9 MG/L (ref 4–12)
CHOLEST SERPL-MCNC: 201 MG/DL
HDLC SERPL-MCNC: 67 MG/DL
LDLC SERPL CALC-MCNC: 81 MG/DL
NONHDLC SERPL-MCNC: 134 MG/DL
TRIGL SERPL-MCNC: 264 MG/DL
VALPROATE SERPL-MCNC: 59 MG/L (ref 50–100)

## 2018-05-22 PROCEDURE — 99397 PER PM REEVAL EST PAT 65+ YR: CPT | Performed by: FAMILY MEDICINE

## 2018-05-22 PROCEDURE — 36415 COLL VENOUS BLD VENIPUNCTURE: CPT | Performed by: FAMILY MEDICINE

## 2018-05-22 PROCEDURE — 80061 LIPID PANEL: CPT | Performed by: FAMILY MEDICINE

## 2018-05-22 PROCEDURE — 80156 ASSAY CARBAMAZEPINE TOTAL: CPT | Performed by: FAMILY MEDICINE

## 2018-05-22 PROCEDURE — 80164 ASSAY DIPROPYLACETIC ACD TOT: CPT | Performed by: FAMILY MEDICINE

## 2018-05-22 RX ORDER — FUROSEMIDE 40 MG
40 TABLET ORAL DAILY
Qty: 90 TABLET | Refills: 3 | Status: SHIPPED | OUTPATIENT
Start: 2018-05-22 | End: 2020-07-10

## 2018-05-22 RX ORDER — SIMVASTATIN 20 MG
TABLET ORAL
Qty: 90 TABLET | Refills: 3 | Status: SHIPPED | OUTPATIENT
Start: 2018-05-22 | End: 2020-07-10

## 2018-05-22 RX ORDER — ALENDRONATE SODIUM 70 MG/1
70 TABLET ORAL WEEKLY
Qty: 12 TABLET | Refills: 3 | Status: SHIPPED | OUTPATIENT
Start: 2018-05-22 | End: 2021-10-28

## 2018-05-22 RX ORDER — GUAIFENESIN 200 MG/1
400 TABLET ORAL EVERY 6 HOURS PRN
Qty: 60 TABLET | Refills: 3 | Status: SHIPPED | OUTPATIENT
Start: 2018-05-22 | End: 2019-04-02

## 2018-05-22 RX ORDER — POTASSIUM CHLORIDE 1500 MG/1
20 TABLET, EXTENDED RELEASE ORAL DAILY
Qty: 90 TABLET | Refills: 3 | Status: SHIPPED | OUTPATIENT
Start: 2018-05-22 | End: 2020-07-10

## 2018-05-22 RX ORDER — CARBAMAZEPINE 200 MG/1
300 TABLET ORAL 2 TIMES DAILY
Qty: 120 TABLET | Refills: 3 | Status: SHIPPED | OUTPATIENT
Start: 2018-05-22 | End: 2022-10-05

## 2018-05-22 RX ORDER — FLUOXETINE 10 MG/1
10 TABLET ORAL DAILY
Qty: 90 TABLET | Refills: 3 | Status: SHIPPED | OUTPATIENT
Start: 2018-05-22 | End: 2022-10-05

## 2018-05-22 NOTE — MR AVS SNAPSHOT
After Visit Summary   5/22/2018    Loraine Aviles    MRN: [id number]           Patient Information     Date Of Birth          1945        Visit Information        Provider Department      5/22/2018 10:40 AM Carlos Servin MD [facility]        Today's Diagnoses     Routine general medical examination at a health care facility    -  1    Seizure disorder (H)        Osteoporosis, unspecified osteoporosis type, unspecified pathological fracture presence        Edema, unspecified type        Acute viral bronchitis        Cough        Hypokalemia        Pure hypercholesterolemia        Mild major depression (H)          Care Instructions      Preventive Health Recommendations    Female Ages 65 +    Yearly exam:     See your health care provider every year in order to  o Review health changes.   o Discuss preventive care.    o Review your medicines if your doctor has prescribed any.      You no longer need a yearly Pap test unless you've had an abnormal Pap test in the past 10 years. If you have vaginal symptoms, such as bleeding or discharge, be sure to talk with your provider about a Pap test.      Every 1 to 2 years, have a mammogram.  If you are over 69, talk with your health care provider about whether or not you want to continue having screening mammograms.      Every 10 years, have a colonoscopy. Or, have a yearly FIT test (stool test). These exams will check for colon cancer.       Have a cholesterol test every 5 years, or more often if your doctor advises it.       Have a diabetes test (fasting glucose) every three years. If you are at risk for diabetes, you should have this test more often.       At age 65, have a bone density scan (DEXA) to check for osteoporosis (brittle bone disease).    Shots:    Get a flu shot each year.    Get a tetanus shot every 10 years.    Talk to your doctor about your pneumonia vaccines. There are now two you should receive - Pneumovax (PPSV  "23) and Prevnar (PCV 13).    Talk to your doctor about the shingles vaccine.    Talk to your doctor about the hepatitis B vaccine.    Nutrition:     Eat at least 5 servings of fruits and vegetables each day.      Eat whole-grain bread, whole-wheat pasta and brown rice instead of white grains and rice.      Talk to your provider about Calcium and Vitamin D.     Lifestyle    Exercise at least 150 minutes a week (30 minutes a day, 5 days a week). This will help you control your weight and prevent disease.      Limit alcohol to one drink per day.      No smoking.       Wear sunscreen to prevent skin cancer.       See your dentist twice a year for an exam and cleaning.      See your eye doctor every 1 to 2 years to screen for conditions such as glaucoma, macular degeneration and cataracts.          Follow-ups after your visit        Who to contact     If you have questions or need follow up information about today's clinic visit or your schedule please contact [facility] directly at [phone].  Normal or non-critical lab and imaging results will be communicated to you by [employer] Technologyhart, letter or phone within 4 business days after the clinic has received the results. If you do not hear from us within 7 days, please contact the clinic through [employer] or phone. If you have a critical or abnormal lab result, we will notify you by phone as soon as possible.  Submit refill requests through [employer] or call your pharmacy and they will forward the refill request to us. Please allow 3 business days for your refill to be completed.          Additional Information About Your Visit        [employer] Global Information     [employer] lets you send messages to your doctor, view your test results, renew your prescriptions, schedule appointments and more. To sign up, go to [email or web address] . Click on \"Log in\" on the left side of the screen, which will take you to the Welcome page. Then click on \"Sign up Now\" on the right side " "of the page.     You will be asked to enter the access code listed below, as well as some personal information. Please follow the directions to create your username and password.     Your access code is: [email or web address]  Expires: 2018 12:48 PM     Your access code will  in 90 days. If you need help or a new code, please call your [facility] or [phone].        Care EveryWhere ID     This is your Care EveryWhere ID. This could be used by other organizations to access your [place] medical records  [id number]        Your Vitals Were     Pulse Temperature Height BMI (Body Mass Index)          65 97.7  F (36.5  C) 5' 6.5\" (1.689 m) 27.35 kg/m2         Blood Pressure from Last 3 Encounters:   18 124/72   17 133/71   17 130/70    Weight from Last 3 Encounters:   18 172 lb (78 kg)   17 162 lb (73.5 kg)   17 166 lb (75.3 kg)              We Performed the Following     Carbamazepine total     Lipid panel reflex to direct LDL Fasting     Valproic acid          Where to get your medicines      These medications were sent to [facility]. - [place], [place] - [address] [place], [place] [address]e. S.  [address] [place], [place] [address]e. S., [facility] [address]     Phone:  [phone]     alendronate 70 MG tablet    carBAMazepine 200 MG tablet    FLUoxetine HCl (PMDD) 10 MG Tabs    FLUoxetine HCl (PMDD) 20 MG Caps    furosemide 40 MG tablet    guaiFENesin 200 MG Tabs tablet    potassium chloride SA 20 MEQ CR tablet    simvastatin 20 MG tablet          Primary Care Provider Office Phone # Fax #    Carlos Servin [phone] [address] [facility] [address]        Equal Access to Services     [facility] EILEEN AH: Erasto regano Sojuve, waaxda luqadaha, qaybta kaalmada manjit, erick west. So [facility] [phone].    ATENCIÓN: Si habla español, tiene a grullon disposición servicios gratuitos de asistencia lingüística. Llame al [phone].    We " comply with applicable federal civil rights laws and [place] laws. We do not discriminate on the basis of race, color, national origin, age, disability, sex, sexual orientation, or gender identity.            Thank you!     Thank you for choosing [facility]  for your care. Our goal is always to provide you with excellent care. Hearing back from our patients is one way we can continue to improve our services. Please take a few minutes to complete the written survey that you may receive in the mail after your visit with us. Thank you!             Your Updated Medication List - Protect others around you: Learn how to safely use, store and throw away your medicines at www.disposemymeds.org.          This list is accurate as of 5/22/18  1:11 PM.  Always use your most recent med list.                   Brand Name Dispense Instructions for use Diagnosis    acetaminophen 500 MG tablet    TYLENOL    360 tablet    Take 2 tablets by mouth 3 times daily.    Hip pain       alendronate 70 MG tablet    FOSAMAX    12 tablet    Take 1 tablet (70 mg) by mouth once a week    Osteoporosis, unspecified osteoporosis type, unspecified pathological fracture presence       CALCIUM 500 + D PO      1 TABLET ORALLY 3 TIMES DAILY        carBAMazepine 200 MG tablet    TEGretol    120 tablet    Take 1.5 tablets (300 mg) by mouth 2 times daily 200 mg in Am and 300 mg in the PM    Seizure disorder (H)       CERTAGEN Tabs     30 tablet    Take 1 tablet by mouth daily        DEPAKOTE 500 MG DR tablet   Generic drug:  divalproex sodium delayed-release     120 tablet    Take 1 tablet by mouth 3 times daily.    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, without mention of intractable epilepsy       * FLUoxetine HCl (PMDD) 10 MG Tabs     90 tablet    Take 10 mg by mouth daily    Mild major depression (H)       * FLUoxetine HCl (PMDD) 20 MG Caps     90 capsule    Take 20 mg by  mouth daily    Mild major depression (H)       furosemide 40 MG tablet    LASIX    90 tablet    Take 1 tablet (40 mg) by mouth daily    Edema, unspecified type       guaiFENesin 200 MG Tabs tablet    ORGANIDIN    60 tablet    Take 2 tablets (400 mg) by mouth every 6 hours as needed for cough    Acute viral bronchitis, Cough       ibuprofen 200 MG tablet    ADVIL/MOTRIN     Take 200 mg by mouth every 4 hours as needed for mild pain        potassium chloride SA 20 MEQ CR tablet    KLOR-CON    90 tablet    Take 1 tablet (20 mEq) by mouth daily    Hypokalemia       simvastatin 20 MG tablet    ZOCOR    90 tablet    1 TABLET AT BEDTIME    Pure hypercholesterolemia       * Notice:  This list has 2 medication(s) that are the same as other medications prescribed for you. Read the directions carefully, and ask your doctor or other care provider to review them with you.

## 2018-05-22 NOTE — TELEPHONE ENCOUNTER
Pharmacy called to roselia if patient should be taking Fluoxetine 10 mg 3 capsules a day or if it should be 20 mg one tablet and 10 mg tablet daily.    Writer contacted the group home and the patient is currently taking 10 mg 3 caps daily.  Staff reports the patient would be able to tolerate either one.    Thank you  Ghazala ALEGRIA RN

## 2018-05-22 NOTE — LETTER
[facility]  [address]  [facility] [address]  Phone: [phone]      5/25/2018     Loraine Aviles  [address] [facility] [address]      Dear Loraine:    Thank you for allowing me to participate in your care. Your recent test results were reviewed and listed below. tests are acceptable     Your results are provided below for your review  Results for orders placed or performed in visit on 05/22/18   Lipid panel reflex to direct LDL Fasting   Result Value Ref Range    Cholesterol 201 (H) <200 mg/dL    Triglycerides 264 (H) <150 mg/dL    HDL Cholesterol 67 >49 mg/dL    LDL Cholesterol Calculated 81 <100 mg/dL    Non HDL Cholesterol 134 (H) <130 mg/dL   Carbamazepine total   Result Value Ref Range    Carbamazepine Level 8.9 4.0 - 12.0 mg/L   Valproic acid   Result Value Ref Range    Valproic Acid Level 59 50 - 100 mg/L                 Thank you for choosing [facility]. As a result, please continue with the treatment plan discussed in the office. Return as discussed or sooner if symptoms worsen or fail to improve. If you have any further questions or concerns, please do not hesitate to contact us.      Sincerely,        Dr. Carlos Servin

## 2018-05-22 NOTE — PROGRESS NOTES
SUBJECTIVE:   Loraine Aviles is a 72 year old female who presents for Preventive Visit.      Are you in the first 12 months of your Medicare Part B coverage?  No    Healthy Habits:    Do you get at least three servings of calcium containing foods daily (dairy, green leafy vegetables, etc.)? yes    Amount of exercise or daily activities, outside of work: walking     Problems taking medications regularly No    Medication side effects: No    Have you had an eye exam in the past two years? yes    Do you see a dentist twice per year? yes    Do you have sleep apnea, excessive snoring or daytime drowsiness?no      Ability to successfully perform activities of daily living: No, needs assistance with: transportation, shopping, preparing meals, housework and bathing    Home safety:  none identified     Hearing impairment: No    Fall risk:             COGNITIVE SCREENING:  Not appropriate due to mental handicap        Depression Followup    Status since last visit: Stable     See PHQ-9 for current symptoms.  Other associated symptoms: None    Complicating factors:   Significant life event:  No   Current substance abuse:  None  Anxiety or Panic symptoms:  No    PHQ-9 4/6/2016 3/1/2017   Total Score 0 2   Q9: Suicide Ideation Not at all Not at all       PHQ-9  English  PHQ-9   Any Language  Suicide Assessment Five-step Evaluation and Treatment (SAFE-T)    Reviewed and updated as needed this visit by clinical staff  Tobacco  Allergies  Meds  Med Hx  Surg Hx  Fam Hx  Soc Hx        Reviewed and updated as needed this visit by Provider        Social History   Substance Use Topics     Smoking status: Never Smoker     Smokeless tobacco: Never Used     Alcohol use No       If you drink alcohol do you typically have >3 drinks per day or >7 drinks per week? No                        Today's PHQ-2 Score:   PHQ-2 ( 1999 [employer]) 6/20/2017 4/6/2016   Q1: Little interest or pleasure in doing things 0 0   Q2: Feeling down,  "depressed or hopeless 0 0   PHQ-2 Score 0 0       Do you feel safe in your environment - Yes    Do you have a Health Care Directive?: No: Advance care planning was reviewed with patient; patient declined at this time.    Current providers sharing in care for this patient include:   Patient Care Team:  Carlos Servin MD as PCP - General ([facility])    The following health maintenance items are reviewed in Epic and correct as of today:  Health Maintenance   Topic Date Due     MEDICARE ANNUAL WELLNESS VISIT  07/09/1963     DEPRESSION ACTION PLAN Q1 YR  04/06/2017     PHQ-9 Q6 MONTHS  09/01/2017     MAMMO SCREEN Q2 YR (SYSTEM ASSIGNED)  04/06/2018     FALL RISK ASSESSMENT  06/20/2018     ADVANCE DIRECTIVE PLANNING Q5 YRS  09/10/2020     LIPID SCREEN Q5 YR FEMALE (SYSTEM ASSIGNED)  04/06/2021     COLON CANCER SCREEN (SYSTEM ASSIGNED)  09/25/2022     TETANUS IMMUNIZATION (SYSTEM ASSIGNED)  03/26/2023     DEXA SCAN SCREENING (SYSTEM ASSIGNED)  Completed     PNEUMOCOCCAL  Completed     INFLUENZA VACCINE  Completed     Labs reviewed in EPIC        ROS:  Constitutional, HEENT, cardiovascular, pulmonary, GI, , musculoskeletal, neuro, skin, endocrine and psych systems are negative, except as otherwise noted.    OBJECTIVE:   /72  Pulse 65  Temp 97.7  F (36.5  C)  Ht 5' 6.5\" (1.689 m)  Wt 172 lb (78 kg)  BMI 27.35 kg/m2 Estimated body mass index is 27.35 kg/(m^2) as calculated from the following:    Height as of this encounter: 5' 6.5\" (1.689 m).    Weight as of this encounter: 172 lb (78 kg).  EXAM:   GENERAL APPEARANCE: healthy, alert and no distress  EYES: Eyes grossly normal to inspection, PERRL and conjunctivae and sclerae normal  HENT: ear canals and TM's normal, nose and mouth without ulcers or lesions, oropharynx clear and oral mucous membranes moist  NECK: no adenopathy, no asymmetry, masses, or scars and thyroid normal to palpation  RESP: lungs clear to auscultation - no rales, rhonchi or " wheezes  BREAST: normal without masses, tenderness or nipple discharge and no palpable axillary masses or adenopathy  CV: regular rate and rhythm, normal S1 S2, no S3 or S4, no murmur, click or rub, no peripheral edema and peripheral pulses strong  ABDOMEN: soft, nontender, no hepatosplenomegaly, no masses and bowel sounds normal  MS: no musculoskeletal defects are noted and gait is age appropriate without ataxia  SKIN: no suspicious lesions or rashes  NEURO: Normal strength and tone, sensory exam grossly normal, mentation intact and speech normal  PSYCH: mentation appears normal and affect normal/bright    ASSESSMENT / PLAN:   Loraine was seen today for recheck medication and physical.    Diagnoses and all orders for this visit:    Routine general medical examination at a health care facility  -     Lipid panel reflex to direct LDL Fasting    Seizure disorder (H)  -     Carbamazepine total  -     Valproic acid  -     carBAMazepine (TEGRETOL) 200 MG tablet; Take 1.5 tablets (300 mg) by mouth 2 times daily 200 mg in Am and 300 mg in the PM    Osteoporosis, unspecified osteoporosis type, unspecified pathological fracture presence  -     alendronate (FOSAMAX) 70 MG tablet; Take 1 tablet (70 mg) by mouth once a week    Edema, unspecified type  -     furosemide (LASIX) 40 MG tablet; Take 1 tablet (40 mg) by mouth daily    Acute viral bronchitis  -     guaiFENesin (ORGANIDIN) 200 MG TABS tablet; Take 2 tablets (400 mg) by mouth every 6 hours as needed for cough    Cough  -     guaiFENesin (ORGANIDIN) 200 MG TABS tablet; Take 2 tablets (400 mg) by mouth every 6 hours as needed for cough    Hypokalemia  -     potassium chloride SA (KLOR-CON) 20 MEQ CR tablet; Take 1 tablet (20 mEq) by mouth daily    Pure hypercholesterolemia  -     simvastatin (ZOCOR) 20 MG tablet; 1 TABLET AT BEDTIME    Mild major depression (H)  -     FLUoxetine HCl, PMDD, 10 MG TABS; Take 10 mg by mouth daily  -     FLUoxetine HCl, PMDD, 20 MG CAPS; Take  "20 mg by mouth daily        End of Life Planning:  Patient currently has an advanced directive:     COUNSELING:  Reviewed preventive health counseling, as reflected in patient instructions       Regular exercise       Healthy diet/nutrition        Estimated body mass index is 27.35 kg/(m^2) as calculated from the following:    Height as of this encounter: 5' 6.5\" (1.689 m).    Weight as of this encounter: 172 lb (78 kg).       reports that she has never smoked. She has never used smokeless tobacco.      Appropriate preventive services were discussed with this patient, including applicable screening as appropriate for cardiovascular disease, diabetes, osteopenia/osteoporosis, and glaucoma.  As appropriate for age/gender, discussed screening for colorectal cancer, prostate cancer, breast cancer, and cervical cancer. Checklist reviewing preventive services available has been given to the patient.    Reviewed patients plan of care and provided an AVS. The Intermediate Care Plan ( asthma action plan, low back pain action plan, and migraine action plan) for Loraine meets the Care Plan requirement. This Care Plan has been established and reviewed with the Patient.    Counseling Resources:  ATP IV Guidelines  Pooled Cohorts Equation Calculator  Breast Cancer Risk Calculator  FRAX Risk Assessment  ICSI Preventive Guidelines  Dietary Guidelines for Americans, 2010  [employer]'s MyPlate  ASA Prophylaxis  Lung CA Screening    Carlos Servin MD  [facility]  "

## 2018-05-22 NOTE — TELEPHONE ENCOUNTER
Fluoxetine - Pharmacy questioning dosage of 10mg daily on new script. Pt was taking 3  10mg tabs daily.  Please clarify. [facility] - [phone]

## 2018-06-27 ENCOUNTER — TELEPHONE (OUTPATIENT)
Dept: FAMILY MEDICINE | Facility: CLINIC | Age: 73
End: 2018-06-27

## 2018-06-27 NOTE — TELEPHONE ENCOUNTER
Form needs completion/signature for [employer] ICD 10 cods - Paperwork placed in forms box.    Katiuska Ivey  [facility] [place] [facility]

## 2018-07-13 DIAGNOSIS — G40.109 LOCALIZATION-RELATED FOCAL EPILEPSY WITH SIMPLE PARTIAL SEIZURES (H): ICD-10-CM

## 2018-07-16 RX ORDER — DIVALPROEX SODIUM 500 MG/1
TABLET, DELAYED RELEASE ORAL
Refills: 11 | OUTPATIENT
Start: 2018-07-16

## 2018-07-16 NOTE — TELEPHONE ENCOUNTER
Spoke with Pharmacy and refill was sent to wrong provider. Pharmacy will resend to different Physician. [email or web address]

## 2018-09-12 DIAGNOSIS — E55.9 VITAMIN D DEFICIENCY: ICD-10-CM

## 2018-09-12 DIAGNOSIS — F31.9 BIPOLAR DISORDER (H): Primary | ICD-10-CM

## 2018-09-12 LAB
ALBUMIN SERPL-MCNC: 3.3 G/DL (ref 3.4–5)
ALP SERPL-CCNC: 46 U/L (ref 40–150)
ALT SERPL W P-5'-P-CCNC: 21 U/L (ref 0–50)
ANION GAP SERPL CALCULATED.3IONS-SCNC: 7 MMOL/L (ref 3–14)
AST SERPL W P-5'-P-CCNC: 15 U/L (ref 0–45)
BASOPHILS # BLD AUTO: 0 10E9/L (ref 0–0.2)
BASOPHILS NFR BLD AUTO: 0.2 %
BILIRUB SERPL-MCNC: 0.2 MG/DL (ref 0.2–1.3)
BUN SERPL-MCNC: 16 MG/DL (ref 7–30)
CALCIUM SERPL-MCNC: 8.3 MG/DL (ref 8.5–10.1)
CARBAMAZEPINE SERPL-MCNC: 7.8 MG/L (ref 4–12)
CHLORIDE SERPL-SCNC: 104 MMOL/L (ref 94–109)
CO2 SERPL-SCNC: 27 MMOL/L (ref 20–32)
CREAT SERPL-MCNC: 0.65 MG/DL (ref 0.52–1.04)
DIFFERENTIAL METHOD BLD: NORMAL
EOSINOPHIL # BLD AUTO: 0.1 10E9/L (ref 0–0.7)
EOSINOPHIL NFR BLD AUTO: 1.1 %
ERYTHROCYTE [DISTWIDTH] IN BLOOD BY AUTOMATED COUNT: 11.4 % (ref 10–15)
GFR SERPL CREATININE-BSD FRML MDRD: 90 ML/MIN/1.7M2
GLUCOSE SERPL-MCNC: 103 MG/DL (ref 70–99)
HCT VFR BLD AUTO: 42.8 % (ref 35–47)
HGB BLD-MCNC: 14.6 G/DL (ref 11.7–15.7)
LYMPHOCYTES # BLD AUTO: 2.1 10E9/L (ref 0.8–5.3)
LYMPHOCYTES NFR BLD AUTO: 45.1 %
MCH RBC QN AUTO: 32.8 PG (ref 26.5–33)
MCHC RBC AUTO-ENTMCNC: 34.1 G/DL (ref 31.5–36.5)
MCV RBC AUTO: 96 FL (ref 78–100)
MONOCYTES # BLD AUTO: 0.5 10E9/L (ref 0–1.3)
MONOCYTES NFR BLD AUTO: 10.9 %
NEUTROPHILS # BLD AUTO: 2 10E9/L (ref 1.6–8.3)
NEUTROPHILS NFR BLD AUTO: 42.7 %
PLATELET # BLD AUTO: 169 10E9/L (ref 150–450)
POTASSIUM SERPL-SCNC: 4 MMOL/L (ref 3.4–5.3)
PROT SERPL-MCNC: 6.7 G/DL (ref 6.8–8.8)
RBC # BLD AUTO: 4.45 10E12/L (ref 3.8–5.2)
SODIUM SERPL-SCNC: 138 MMOL/L (ref 133–144)
TSH SERPL DL<=0.005 MIU/L-ACNC: 1.42 MU/L (ref 0.4–4)
VALPROATE SERPL-MCNC: 35 MG/L (ref 50–100)
WBC # BLD AUTO: 4.6 10E9/L (ref 4–11)

## 2018-09-12 PROCEDURE — 36415 COLL VENOUS BLD VENIPUNCTURE: CPT | Performed by: PSYCHIATRY & NEUROLOGY

## 2018-09-12 PROCEDURE — 80164 ASSAY DIPROPYLACETIC ACD TOT: CPT | Performed by: PSYCHIATRY & NEUROLOGY

## 2018-09-12 PROCEDURE — 80053 COMPREHEN METABOLIC PANEL: CPT | Performed by: PSYCHIATRY & NEUROLOGY

## 2018-09-12 PROCEDURE — 80156 ASSAY CARBAMAZEPINE TOTAL: CPT | Performed by: PSYCHIATRY & NEUROLOGY

## 2018-09-12 PROCEDURE — 84443 ASSAY THYROID STIM HORMONE: CPT | Performed by: PSYCHIATRY & NEUROLOGY

## 2018-09-12 PROCEDURE — 85025 COMPLETE CBC W/AUTO DIFF WBC: CPT | Performed by: PSYCHIATRY & NEUROLOGY

## 2018-09-12 PROCEDURE — 82306 VITAMIN D 25 HYDROXY: CPT | Performed by: PSYCHIATRY & NEUROLOGY

## 2018-09-13 LAB — DEPRECATED CALCIDIOL+CALCIFEROL SERPL-MC: 43 UG/L (ref 20–75)

## 2018-10-12 ENCOUNTER — TRANSFERRED RECORDS (OUTPATIENT)
Dept: HEALTH INFORMATION MANAGEMENT | Facility: CLINIC | Age: 73
End: 2018-10-12

## 2018-10-23 DIAGNOSIS — M81.0 OSTEOPOROSIS, UNSPECIFIED OSTEOPOROSIS TYPE, UNSPECIFIED PATHOLOGICAL FRACTURE PRESENCE: ICD-10-CM

## 2018-10-24 RX ORDER — ALENDRONATE SODIUM 70 MG/1
TABLET ORAL
Refills: 10 | OUTPATIENT
Start: 2018-10-24

## 2018-10-24 NOTE — TELEPHONE ENCOUNTER
Patient has refills, called pharmacy and they sent in error, will remove using reason: refilled too soon.    NATHANIEL Hirsch

## 2019-04-01 ENCOUNTER — MEDICAL CORRESPONDENCE (OUTPATIENT)
Dept: HEALTH INFORMATION MANAGEMENT | Facility: CLINIC | Age: 74
End: 2019-04-01

## 2019-04-02 ENCOUNTER — OFFICE VISIT (OUTPATIENT)
Dept: FAMILY MEDICINE | Facility: CLINIC | Age: 74
End: 2019-04-02
Payer: MEDICARE

## 2019-04-02 VITALS
RESPIRATION RATE: 18 BRPM | HEIGHT: 67 IN | DIASTOLIC BLOOD PRESSURE: 72 MMHG | WEIGHT: 169 LBS | HEART RATE: 68 BPM | OXYGEN SATURATION: 92 % | BODY MASS INDEX: 26.53 KG/M2 | TEMPERATURE: 98.6 F | SYSTOLIC BLOOD PRESSURE: 130 MMHG

## 2019-04-02 DIAGNOSIS — Z00.00 ROUTINE GENERAL MEDICAL EXAMINATION AT A HEALTH CARE FACILITY: ICD-10-CM

## 2019-04-02 DIAGNOSIS — G40.909 SEIZURE DISORDER (H): Primary | ICD-10-CM

## 2019-04-02 PROCEDURE — 99213 OFFICE O/P EST LOW 20 MIN: CPT | Performed by: FAMILY MEDICINE

## 2019-04-02 ASSESSMENT — PATIENT HEALTH QUESTIONNAIRE - PHQ9: SUM OF ALL RESPONSES TO PHQ QUESTIONS 1-9: 1

## 2019-04-02 ASSESSMENT — MIFFLIN-ST. JEOR: SCORE: 1291.27

## 2019-04-02 NOTE — PATIENT INSTRUCTIONS
Our Clinic hours are:  Mondays    7:20 am - 7 pm  Tues -  Fri  7:20 am - 5 pm    Clinic Phone: [phone]    The clinic lab opens at 7:30 am Mon - Fri and appointments are required.    [facility]. [phone]  Monday  8 am - 7pm  Tues - Fri 8 am - 5:30 pm

## 2019-04-02 NOTE — PROGRESS NOTES
"  SUBJECTIVE:   Loraine Aviles is a 73 year old female who presents to clinic today for the following health issues:      Medication Followup of   All medications     Taking Medication as prescribed: yes    Side Effects:  None    Medication Helping Symptoms:  yes           Problem list and histories reviewed & adjusted, as indicated.  Additional history:         Reviewed and updated as needed this visit by clinical staff  Tobacco  Allergies  Meds  Med Hx  Surg Hx  Fam Hx  Soc Hx      Reviewed and updated as needed this visit by Provider         Further history obtained, clarified or corrected by physician:  She is here for routine follow-up.  No specific complaints today.    OBJECTIVE:  /72   Pulse 68   Temp 98.6  F (37  C)   Resp 18   Ht 1.689 m (5' 6.5\")   Wt 76.7 kg (169 lb)   LMP 04/02/2004 (Approximate)   SpO2 92%   BMI 26.87 kg/m    LUNGS: clear to auscultation, normal breath sounds  CV: RRR without murmur  ABD: BS+, soft, nontender, no masses, no hepatosplenomegaly  EXTREMITIES: without joint tenderness, swelling or erythema.  No muscle tenderness or abnormality.  SKIN: No rashes or abnormalities  NEURO:non focal exam    ASSESSMENT:     Routine general medical examination at a health care facility  Seizure disorder (H)    PLAN:  Recommend routine healthcare maintenance.        "

## 2019-06-18 ENCOUNTER — OFFICE VISIT (OUTPATIENT)
Dept: FAMILY MEDICINE | Facility: CLINIC | Age: 74
End: 2019-06-18
Payer: MEDICARE

## 2019-06-18 VITALS
RESPIRATION RATE: 16 BRPM | OXYGEN SATURATION: 96 % | SYSTOLIC BLOOD PRESSURE: 126 MMHG | HEART RATE: 66 BPM | HEIGHT: 66 IN | WEIGHT: 170 LBS | DIASTOLIC BLOOD PRESSURE: 72 MMHG | TEMPERATURE: 98.1 F | BODY MASS INDEX: 27.32 KG/M2

## 2019-06-18 DIAGNOSIS — Z00.00 ENCOUNTER FOR MEDICARE ANNUAL WELLNESS EXAM: Primary | ICD-10-CM

## 2019-06-18 PROCEDURE — G0439 PPPS, SUBSEQ VISIT: HCPCS | Performed by: FAMILY MEDICINE

## 2019-06-18 ASSESSMENT — PAIN SCALES - GENERAL: PAINLEVEL: NO PAIN (0)

## 2019-06-18 ASSESSMENT — MIFFLIN-ST. JEOR: SCORE: 1287.86

## 2019-06-18 ASSESSMENT — ACTIVITIES OF DAILY LIVING (ADL)
CURRENT_FUNCTION: TRANSPORTATION REQUIRES ASSISTANCE
CURRENT_FUNCTION: BATHING REQUIRES ASSISTANCE
CURRENT_FUNCTION: MEDICATION ADMINISTRATION REQUIRES ASSISTANCE
CURRENT_FUNCTION: HOUSEWORK REQUIRES ASSISTANCE
CURRENT_FUNCTION: PREPARING MEALS REQUIRES ASSISTANCE
CURRENT_FUNCTION: MONEY MANAGEMENT REQUIRES ASSISTANCE
CURRENT_FUNCTION: SHOPPING REQUIRES ASSISTANCE
CURRENT_FUNCTION: LAUNDRY REQUIRES ASSISTANCE

## 2019-06-18 NOTE — PATIENT INSTRUCTIONS
Patient Education   Personalized Prevention Plan  You are due for the preventive services outlined below.  Your care team is available to assist you in scheduling these services.  If you have already completed any of these items, please share that information with your care team to update in your medical record.  Health Maintenance Due   Topic Date Due     Zoster (Shingles) Vaccine (1 of 2) 07/09/1994     Mammogram  04/06/2018     Annual Wellness Visit  05/22/2019

## 2019-06-18 NOTE — PROGRESS NOTES
"SUBJECTIVE:   Loraine Aviles is a 73 year old female who presents for Preventive Visit.      Are you in the first 12 months of your Medicare coverage?  No    Healthy Habits:     In general, how would you rate your overall health?  Good    Frequency of exercise:  6-7 days/week    Duration of exercise:  Less than 15 minutes    Do you usually eat at least 4 servings of fruit and vegetables a day, include whole grains    & fiber and avoid regularly eating high fat or \"junk\" foods?  Yes    Taking medications regularly:  Yes    Barriers to taking medications:  None    Medication side effects:  None    Ability to successfully perform activities of daily living:  Transportation requires assistance, shopping requires assistance, preparing meals requires assistance, housework requires assistance, bathing requires assistance, laundry requires assistance, medication administration requires assistance and money management requires assistance    Home Safety:  No safety concerns identified    Hearing Impairment:  No hearing concerns    In the past 6 months, have you been bothered by leaking of urine?  No    In general, how would you rate your overall mental or emotional health?  Good      PHQ-2 Total Score: 0    Additional concerns today:  No    Do you feel safe in your environment? Yes    Do you have a Health Care Directive? No: Advance care planning was reviewed with patient; patient declined at this time.      Fall risk  Fallen 2 or more times in the past year?: No  Any fall with injury in the past year?: No    Cognitive Screening Not appropriate due to mental handicap    Do you have sleep apnea, excessive snoring or daytime drowsiness?: no    Reviewed and updated as needed this visit by clinical staff         Reviewed and updated as needed this visit by Provider        Social History     Tobacco Use     Smoking status: Never Smoker     Smokeless tobacco: Never Used   Substance Use Topics     Alcohol use: No     If you " "drink alcohol do you typically have >3 drinks per day or >7 drinks per week? No    Alcohol Use 6/20/2017   Prescreen: >3 drinks/day or >7 drinks/week? The patient does not drink >3 drinks per day nor >7 drinks per week.               Current providers sharing in care for this patient include:   Patient Care Team:  Carols Servin MD as PCP - General (Family Practice)  Carlos Servin MD as Assigned PCP    The following health maintenance items are reviewed in Epic and correct as of today:  Health Maintenance   Topic Date Due     ZOSTER IMMUNIZATION (1 of 2) 07/09/1994     MAMMO SCREENING  04/06/2018     MEDICARE ANNUAL WELLNESS VISIT  05/22/2019     INFLUENZA VACCINE (Season Ended) 09/01/2019     PHQ-9  10/02/2019     ADVANCE CARE PLANNING  09/10/2020     COLONOSCOPY  09/25/2022     DTAP/TDAP/TD IMMUNIZATION (4 - Td) 03/26/2023     LIPID  05/22/2023     DEXA  Completed     DEPRESSION ACTION PLAN  Completed     IPV IMMUNIZATION  Aged Out     MENINGITIS IMMUNIZATION  Aged Out     Labs reviewed in EPIC      Review of Systems  Constitutional, HEENT, cardiovascular, pulmonary, GI, , musculoskeletal, neuro, skin, endocrine and psych systems are negative, except as otherwise noted.    OBJECTIVE:   LMP 04/02/2004 (Approximate)  Estimated body mass index is 26.87 kg/m  as calculated from the following:    Height as of 4/2/19: 1.689 m (5' 6.5\").    Weight as of 4/2/19: 76.7 kg (169 lb).  Physical Exam  GENERAL APPEARANCE: healthy, alert and no distress  EYES: Eyes grossly normal to inspection, PERRL and conjunctivae and sclerae normal  HENT: ear canals and TM's normal, nose and mouth without ulcers or lesions, oropharynx clear and oral mucous membranes moist  NECK: no adenopathy, no asymmetry, masses, or scars and thyroid normal to palpation  RESP: lungs clear to auscultation - no rales, rhonchi or wheezes  BREAST: normal without masses, tenderness or nipple discharge and no palpable axillary masses or adenopathy  CV: " "regular rate and rhythm, normal S1 S2, no S3 or S4, no murmur, click or rub, no peripheral edema and peripheral pulses strong  ABDOMEN: soft, nontender, no hepatosplenomegaly, no masses and bowel sounds normal  MS: no musculoskeletal defects are noted and gait is age appropriate without ataxia  SKIN: no suspicious lesions or rashes  NEURO: Normal strength and tone, sensory exam grossly normal, mentation intact and speech normal  PSYCH: mentation appears normal and affect normal/bright    Diagnostic Test Results:  Labs reviewed in Epic    ASSESSMENT / PLAN:   Loraine was seen today for physical.    Diagnoses and all orders for this visit:    Encounter for Medicare annual wellness exam        End of Life Planning:  Patient currently has an advanced directive:     COUNSELING:  Reviewed preventive health counseling, as reflected in patient instructions       Healthy diet/nutrition       Vision screening    Estimated body mass index is 26.87 kg/m  as calculated from the following:    Height as of 4/2/19: 1.689 m (5' 6.5\").    Weight as of 4/2/19: 76.7 kg (169 lb).         reports that she has never smoked. She has never used smokeless tobacco.      Appropriate preventive services were discussed with this patient, including applicable screening as appropriate for cardiovascular disease, diabetes, osteopenia/osteoporosis, and glaucoma.  As appropriate for age/gender, discussed screening for colorectal cancer, prostate cancer, breast cancer, and cervical cancer. Checklist reviewing preventive services available has been given to the patient.    Reviewed patients plan of care and provided an AVS. The Basic Care Plan (routine screening as documented in Health Maintenance) for Loraine meets the Care Plan requirement. This Care Plan has been established and reviewed with the Patient.    Counseling Resources:  ATP IV Guidelines  Pooled Cohorts Equation Calculator  Breast Cancer Risk Calculator  FRAX Risk Assessment  ICSI " Preventive Guidelines  Dietary Guidelines for Americans, 2010  USDA's MyPlate  ASA Prophylaxis  Lung CA Screening    Carlos Servin MD  [facility] [place]    Identified Health Risks:

## 2019-06-26 ENCOUNTER — MEDICAL CORRESPONDENCE (OUTPATIENT)
Dept: HEALTH INFORMATION MANAGEMENT | Facility: CLINIC | Age: 74
End: 2019-06-26

## 2019-06-26 ENCOUNTER — TELEPHONE (OUTPATIENT)
Dept: FAMILY MEDICINE | Facility: CLINIC | Age: 74
End: 2019-06-26

## 2019-06-26 NOTE — TELEPHONE ENCOUNTER
Form needs completion/signature for[employer], Urinary Incontinence [employer]  [place]      - Paperwork placed in JORGE ALBERTO Sheridan  box.    Katiuska Ivey  [facility] [place] [facility]

## 2019-07-02 ENCOUNTER — TELEPHONE (OUTPATIENT)
Dept: FAMILY MEDICINE | Facility: CLINIC | Age: 74
End: 2019-07-02

## 2019-07-02 ENCOUNTER — MEDICAL CORRESPONDENCE (OUTPATIENT)
Dept: HEALTH INFORMATION MANAGEMENT | Facility: CLINIC | Age: 74
End: 2019-07-02

## 2020-03-18 DIAGNOSIS — G40.909 SEIZURE DISORDER (H): Primary | ICD-10-CM

## 2020-03-18 DIAGNOSIS — F32.0 MILD MAJOR DEPRESSION (H): ICD-10-CM

## 2020-03-18 DIAGNOSIS — R62.50 DELAY IN DEVELOPMENT: ICD-10-CM

## 2020-03-18 LAB
ALBUMIN SERPL-MCNC: 3.5 G/DL (ref 3.4–5)
ALP SERPL-CCNC: 52 U/L (ref 40–150)
ALT SERPL W P-5'-P-CCNC: 26 U/L (ref 0–50)
ANION GAP SERPL CALCULATED.3IONS-SCNC: 3 MMOL/L (ref 3–14)
AST SERPL W P-5'-P-CCNC: 15 U/L (ref 0–45)
BASOPHILS # BLD AUTO: 0 10E9/L (ref 0–0.2)
BASOPHILS NFR BLD AUTO: 0.4 %
BILIRUB SERPL-MCNC: 0.2 MG/DL (ref 0.2–1.3)
BUN SERPL-MCNC: 17 MG/DL (ref 7–30)
CALCIUM SERPL-MCNC: 9 MG/DL (ref 8.5–10.1)
CARBAMAZEPINE SERPL-MCNC: 8.1 MG/L (ref 4–12)
CHLORIDE SERPL-SCNC: 103 MMOL/L (ref 94–109)
CO2 SERPL-SCNC: 31 MMOL/L (ref 20–32)
CREAT SERPL-MCNC: 0.59 MG/DL (ref 0.52–1.04)
DIFFERENTIAL METHOD BLD: ABNORMAL
EOSINOPHIL # BLD AUTO: 0.1 10E9/L (ref 0–0.7)
EOSINOPHIL NFR BLD AUTO: 2 %
ERYTHROCYTE [DISTWIDTH] IN BLOOD BY AUTOMATED COUNT: 11.8 % (ref 10–15)
GFR SERPL CREATININE-BSD FRML MDRD: 89 ML/MIN/{1.73_M2}
GLUCOSE SERPL-MCNC: 119 MG/DL (ref 70–99)
HCT VFR BLD AUTO: 44.3 % (ref 35–47)
HGB BLD-MCNC: 15.4 G/DL (ref 11.7–15.7)
LYMPHOCYTES # BLD AUTO: 2.3 10E9/L (ref 0.8–5.3)
LYMPHOCYTES NFR BLD AUTO: 46.9 %
MCH RBC QN AUTO: 33.5 PG (ref 26.5–33)
MCHC RBC AUTO-ENTMCNC: 34.8 G/DL (ref 31.5–36.5)
MCV RBC AUTO: 96 FL (ref 78–100)
MONOCYTES # BLD AUTO: 0.4 10E9/L (ref 0–1.3)
MONOCYTES NFR BLD AUTO: 8 %
NEUTROPHILS # BLD AUTO: 2.1 10E9/L (ref 1.6–8.3)
NEUTROPHILS NFR BLD AUTO: 42.7 %
PLATELET # BLD AUTO: 178 10E9/L (ref 150–450)
POTASSIUM SERPL-SCNC: 3.7 MMOL/L (ref 3.4–5.3)
PROT SERPL-MCNC: 6.9 G/DL (ref 6.8–8.8)
RBC # BLD AUTO: 4.6 10E12/L (ref 3.8–5.2)
SODIUM SERPL-SCNC: 137 MMOL/L (ref 133–144)
TSH SERPL DL<=0.005 MIU/L-ACNC: 1.5 MU/L (ref 0.4–4)
VALPROATE SERPL-MCNC: 39 MG/L (ref 50–100)
WBC # BLD AUTO: 4.9 10E9/L (ref 4–11)

## 2020-03-18 PROCEDURE — 80156 ASSAY CARBAMAZEPINE TOTAL: CPT | Performed by: PSYCHIATRY & NEUROLOGY

## 2020-03-18 PROCEDURE — 36415 COLL VENOUS BLD VENIPUNCTURE: CPT | Performed by: PSYCHIATRY & NEUROLOGY

## 2020-03-18 PROCEDURE — 80053 COMPREHEN METABOLIC PANEL: CPT | Performed by: PSYCHIATRY & NEUROLOGY

## 2020-03-18 PROCEDURE — 80050 GENERAL HEALTH PANEL: CPT | Performed by: PSYCHIATRY & NEUROLOGY

## 2020-03-18 PROCEDURE — 84443 ASSAY THYROID STIM HORMONE: CPT | Performed by: PSYCHIATRY & NEUROLOGY

## 2020-03-18 PROCEDURE — 80164 ASSAY DIPROPYLACETIC ACD TOT: CPT | Performed by: PSYCHIATRY & NEUROLOGY

## 2020-04-13 ENCOUNTER — TELEPHONE (OUTPATIENT)
Dept: FAMILY MEDICINE | Facility: CLINIC | Age: 75
End: 2020-04-13

## 2020-04-13 NOTE — TELEPHONE ENCOUNTER
Reason for Call: Request for an order or referral:    Order or referral being requested: Order for urinary supplies signed by Dr. Murray and faxed back to [facility] - Copy to scanning     Date needed: at your convenience    Has the patient been seen by the PCP for this problem? NO    Additional comments:         Call taken on 4/13/2020 at 10:19 AM by Shauna Kent

## 2020-04-16 NOTE — TELEPHONE ENCOUNTER
Form was faxed back to us from [facility]. They could not accept it, as the MD name printed is not the MD that signed it. They sent a new one.   Placed in Dr. Armando Ferguson  [facility]

## 2020-04-17 NOTE — TELEPHONE ENCOUNTER
New form faxed to Eduardo [phone].  [facility]  [facility] [place]

## 2020-06-28 ENCOUNTER — HOSPITAL ENCOUNTER (EMERGENCY)
Facility: CLINIC | Age: 75
Discharge: HOME | End: 2020-06-28
Attending: FAMILY MEDICINE | Admitting: FAMILY MEDICINE
Payer: MEDICARE

## 2020-06-28 ENCOUNTER — APPOINTMENT (OUTPATIENT)
Dept: CT IMAGING | Facility: CLINIC | Age: 75
End: 2020-06-28
Attending: FAMILY MEDICINE
Payer: MEDICARE

## 2020-06-28 ENCOUNTER — APPOINTMENT (OUTPATIENT)
Dept: GENERAL RADIOLOGY | Facility: CLINIC | Age: 75
End: 2020-06-28
Attending: FAMILY MEDICINE
Payer: MEDICARE

## 2020-06-28 VITALS
SYSTOLIC BLOOD PRESSURE: 136 MMHG | DIASTOLIC BLOOD PRESSURE: 71 MMHG | BODY MASS INDEX: 27.03 KG/M2 | TEMPERATURE: 97.6 F | HEIGHT: 67 IN | RESPIRATION RATE: 19 BRPM | HEART RATE: 72 BPM | OXYGEN SATURATION: 93 %

## 2020-06-28 DIAGNOSIS — S32.010A COMPRESSION FRACTURE OF L1 VERTEBRA, INITIAL ENCOUNTER (H): ICD-10-CM

## 2020-06-28 DIAGNOSIS — S32.020A CLOSED COMPRESSION FRACTURE OF L2 LUMBAR VERTEBRA, INITIAL ENCOUNTER (H): ICD-10-CM

## 2020-06-28 PROCEDURE — 25000132 ZZH RX MED GY IP 250 OP 250 PS 637: Mod: GY | Performed by: FAMILY MEDICINE

## 2020-06-28 PROCEDURE — 99284 EMERGENCY DEPT VISIT MOD MDM: CPT | Performed by: FAMILY MEDICINE

## 2020-06-28 PROCEDURE — 22310 CLOSED TX VERT FX W/O MANJ: CPT | Mod: 54 | Performed by: FAMILY MEDICINE

## 2020-06-28 PROCEDURE — 72131 CT LUMBAR SPINE W/O DYE: CPT

## 2020-06-28 PROCEDURE — 99284 EMERGENCY DEPT VISIT MOD MDM: CPT | Mod: 25 | Performed by: FAMILY MEDICINE

## 2020-06-28 PROCEDURE — 22310 CLOSED TX VERT FX W/O MANJ: CPT | Performed by: FAMILY MEDICINE

## 2020-06-28 PROCEDURE — 72100 X-RAY EXAM L-S SPINE 2/3 VWS: CPT

## 2020-06-28 RX ORDER — OXYCODONE AND ACETAMINOPHEN 5; 325 MG/1; MG/1
1 TABLET ORAL ONCE
Status: COMPLETED | OUTPATIENT
Start: 2020-06-28 | End: 2020-06-28

## 2020-06-28 RX ORDER — OXYCODONE HYDROCHLORIDE 5 MG/1
5 TABLET ORAL EVERY 6 HOURS PRN
Qty: 12 TABLET | Refills: 0 | Status: SHIPPED | OUTPATIENT
Start: 2020-06-28 | End: 2020-07-13

## 2020-06-28 RX ADMIN — OXYCODONE HYDROCHLORIDE AND ACETAMINOPHEN 1 TABLET: 5; 325 TABLET ORAL at 17:11

## 2020-06-28 NOTE — ED AVS SNAPSHOT
[facility] Emergency Department  [address] [address]  Phone:  [phone]  Fax:  [phone]                                    Loraine Aviles   MRN: [id number]    Department:  [facility] Emergency Department   Date of Visit:  6/28/2020           After Visit Summary Signature Page    I have received my discharge instructions, and my questions have been answered. I have discussed any challenges I see with this plan with the nurse or doctor.    ..........................................................................................................................................  Patient/Patient Representative Signature      ..........................................................................................................................................  Patient Representative Print Name and Relationship to Patient    ..................................................               ................................................  Date                                   Time    ..........................................................................................................................................  Reviewed by Signature/Title    ...................................................              ..............................................  Date                                               Time          22EPIC Rev 08/18

## 2020-06-28 NOTE — ED PROVIDER NOTES
History     Chief Complaint   Patient presents with     Fall     fall earlier today; lower back pain; Hx osteoprosis      HPI  Loraine Aviles is a 74 year old female, past medical history is significant for hyperlipidemia, depression, frequent falls, seizure disorder, urinary incontinence, developmental delay, presents to the emergency department with concerns of fall earlier today with low back pain and history of osteoporosis.  History is obtained from the patient who presents with group home staff with concerns of a fall earlier today approximately 3 to 4 hours prior to arrival.  The patient states that she was cleaning her room and had her ankle twisted on her as has happened in the past and fell turning as she fell landing primarily on her buttocks.  She experienced immediate increase in baseline pain in her low back area.  No medications have been taken.  The patient denies trauma to her head or neck chest or abdomen and denies any pain in those areas.  She has no pain in her arms or legs.  The patient denies any concerning prodromal features such as lightheadedness headache, chest pain, shortness of air, abdominal pain.    Allergies:  Allergies   Allergen Reactions     Detrol [Tolterodine Tartrate] Other (See Comments)     falls     Ditropan [Oxybutynin Chloride] Other (See Comments)     falls     Medicated Other (See Comments)     Allergic to generic seizure meds.       Problem List:    Patient Active Problem List    Diagnosis Date Noted     Nuclear cataract 10/04/2015     Priority: Medium     Advanced directives, counseling/discussion 09/10/2015     Priority: Medium     Patient has completed an Advance/Health Care Directive (HCD), to bring in copy to be scanned into Epic.    Dominique Olson  September 10, 2015           Health Care Home 11/06/2013     Priority: Medium     Enrolled in [place] [employer], a Blue Plus plan.  AS this member's Care Coordinator they facitate communication and  coordinate care across various providers and settings.    Here is a  Summary of this member's most recent Blue Plus Plan of Care:  Behavioral katlin services  Foster Care  Adult day services    This member does not have an advanced directive.    Please call at [phone] for help with this member, especially during times of care transition (such as when seen for emergency services and when admitted to or discharged from a hospital or nursing facility).    Crystal Reynolds, FR[facility] [employer]    See scanned in document.       Benign neoplasm of colon 09/28/2012     Priority: Medium     Benign tubular adenomatous polyps - repeat colon in 5 years       HYPERLIPIDEMIA LDL GOAL <130 10/31/2010     Priority: Medium     Urgency of urination 05/01/2008     Priority: Medium     3/16/11  [facility]  Ham Farrell MD    Assessment: This is a 65 year old female status post IPG replacement doing well.       Delay in development      Priority: Medium     Problem list name updated by automated process. Provider to review       Other urinary incontinence      Priority: Medium     7/2/07  [facility]  Samuel Zhang MD - [phone]  Urge incontinence  Procedures:  1) Stage II interstim implantation                      2) Intraoperative programming       Mild major depression (H)      Priority: Medium     Pt follwed by Dr. Ugalde ( psych)-- seen 2x per year       Fall 11/04/2005     Priority: Medium     Problem list name updated by automated process. Provider to review       Seizure disorder (H) 09/19/2005     Priority: Medium     Edema 09/19/2005     Priority: Medium        Past Medical History:    Past Medical History:   Diagnosis Date     Depressive disorder, not elsewhere classified      Disorder of bone and cartilage, unspecified      Edema      Other convulsions      Other urinary incontinence      Unspecified delay in development(315.9)        Past Surgical History:    Past Surgical History:   Procedure Laterality Date      "APPENDECTOMY       BREAST BIOPSY, RT/LT      breast bx     COLONOSCOPY  05/19/00     IMPLANT STIMULATOR AND LEADS SACRAL NERVE (STAGE ONE AND TWO) N/A 1/13/2015    Procedure: IMPLANT STIMULATOR AND LEADS SACRAL NERVE (STAGE ONE AND TWO);  Surgeon: Jami Cheek MD;  Location: [facility] OR     JOINT REPLACEMTN, KNEE RT/LT Left 10/2014    Joint Replacement knee RT/LT     PHACOEMULSIFICATION WITH STANDARD INTRAOCULAR LENS IMPLANT Right 10/8/2015    Procedure: PHACOEMULSIFICATION WITH STANDARD INTRAOCULAR LENS IMPLANT;  Surgeon: Andrés Brandon MD;  Location: [facility]     REPLACE GENERATOR STIMULATOR (LOCATION) N/A 1/13/2015    Procedure: REPLACE GENERATOR STIMULATOR (LOCATION);  Surgeon: Jami Cheek MD;  Location: [facility] OR     SURGICAL HISTORY OF -       (R) Hammertoe Surgery--all toes on right foot     SURGICAL HISTORY OF -       IPG replacement        Family History:    Family History   Problem Relation Age of Onset     Family History Negative No family hx of        Social History:  Marital Status:  Single [1]  Social History     Tobacco Use     Smoking status: Never Smoker     Smokeless tobacco: Never Used   Substance Use Topics     Alcohol use: No     Drug use: No        Medications:    oxyCODONE (ROXICODONE) 5 MG tablet  acetaminophen (TYLENOL) 500 MG tablet  alendronate (FOSAMAX) 70 MG tablet  CALCIUM 500 + D PO  carBAMazepine (TEGRETOL) 200 MG tablet  divalproex (DEPAKOTE) 500 MG EC tablet  FLUoxetine HCl, PMDD, 10 MG TABS  FLUoxetine HCl, PMDD, 20 MG CAPS  furosemide (LASIX) 40 MG tablet  ibuprofen (ADVIL,MOTRIN) 200 MG tablet  Multiple Vitamins-Minerals (CERTAGEN) TABS  potassium chloride SA (KLOR-CON) 20 MEQ CR tablet  simvastatin (ZOCOR) 20 MG tablet          Review of Systems   All other systems reviewed and are negative.      Physical Exam   BP: 133/78  Pulse: 74  Temp: 97.6  F (36.4  C)  Resp: 19  Height: 168.9 cm (5' 6.5\")  SpO2: 95 %      Physical Exam  Vitals signs and nursing note reviewed. "   Constitutional:       Appearance: Normal appearance. She is obese.   HENT:      Head: Normocephalic and atraumatic.      Right Ear: Tympanic membrane, ear canal and external ear normal.      Left Ear: Tympanic membrane, ear canal and external ear normal.      Nose: Nose normal.      Mouth/Throat:      Mouth: Mucous membranes are moist.      Pharynx: Oropharynx is clear.   Eyes:      Extraocular Movements: Extraocular movements intact.      Conjunctiva/sclera: Conjunctivae normal.      Pupils: Pupils are equal, round, and reactive to light.   Neck:      Musculoskeletal: Normal range of motion and neck supple.   Cardiovascular:      Rate and Rhythm: Normal rate and regular rhythm.   Pulmonary:      Effort: Pulmonary effort is normal.      Breath sounds: Normal breath sounds.   Abdominal:      General: Bowel sounds are normal.      Palpations: Abdomen is soft.   Musculoskeletal:        Back:    Neurological:      Mental Status: She is alert.         ED Course        Procedures               Critical Care time:  none               Results for orders placed or performed during the hospital encounter of 06/28/20 (from the past 24 hour(s))   XR Lumbar Spine 2/3 Views    Narrative    XR LUMBAR SPINE 2-3 VIEWS   6/28/2020 5:23 PM     HISTORY: Fall, low back pain    COMPARISON: None.      Impression    IMPRESSION: L1 compression fracture is present with approximately 30%  height loss, age indeterminant. Diffuse osseous demineralization is  present. Right sacral stimulator device. Mild degenerative change is  present. Cholelithiasis.    GUILLERMINA STEINBERG MD   CT Lumbar Spine w/o Contrast    Narrative    EXAM: CT LUMBAR SPINE W/O CONTRAST  LOCATION: [facility]  DATE/TIME: 6/28/2020 6:49 PM    INDICATION: Trauma.  COMPARISON: Lumbar spine x-rays same day.  TECHNIQUE: Routine without IV contrast. Multiplanar reformats. Dose reduction techniques were used.     FINDINGS:  VERTEBRA: A minimally displaced acute fracture  is present extending through the left inferior aspect of the L2 vertebral body with approximately 5% height loss. No evidence of pedicle or posterior element extension. A chronic appearing L1 compression   fracture is present with approximately 30% height loss. Alignment is significant for slight levoconvex curvature and trace grade I retrolisthesis of L1 on L2. Diffuse osseous demineralization is present.    CANAL/FORAMINA: Degenerative disc disease and facet arthropathy are present. Moderate spinal canal stenosis is present at L2-L3 related to disc bulge and left ligamentum flavum calcification. Mild spinal canal stenosis present at L4-L5 and L5-S1. No   high-grade foraminal stenosis.    PARASPINAL: Paraspinal soft tissues demonstrate right sacral nerve stimulator device and are otherwise unremarkable. Cholelithiasis noted.      Impression    IMPRESSION:  1.  Acute minimally displaced fracture of the L2 vertebral body with 5% height loss. No evidence of pedicle or posterior element extension.  2.  Chronic appearing fracture of the L1 vertebral body with approximately 30% height loss.     5:58 PM  Patient and caregiver updated with respect to the abnormal plain film lumbar.  Plan for CT to assess stability.  They are in agreement with the plan.  She requires nothing further for pain at this point.  7:36 PM  CT imaging reviewed with the patient and caregiver in the room and I answered the questions.  There is no unstable injury identified and the patient is appropriate for disposition back to group home.  Discussed appropriate symptomatic supportive care, baseline nonnarcotic pain medication and also narcotic pain medication if needed for breakthrough pain.  Return to the emergency department if worse or changes.  Otherwise to follow-up with primary care.      Medications   oxyCODONE-acetaminophen (PERCOCET) 5-325 MG per tablet 1 tablet (1 tablet Oral Given 6/28/20 5825)       Assessments & Plan (with Medical  Decision Making)   Assessments and plan with medical decision making at the time stamp above.      Disclaimer: This note consists of symbols derived from keyboarding, dictation and/or voice recognition software. As a result, there may be errors in the script that have gone undetected. Please consider this when interpreting information found in this chart.      I have reviewed the nursing notes.    I have reviewed the findings, diagnosis, plan and need for follow up with the patient.          New Prescriptions    OXYCODONE (ROXICODONE) 5 MG TABLET    Take 1 tablet (5 mg) by mouth every 6 hours as needed for pain       Final diagnoses:   Closed compression fracture of L2 lumbar vertebra, initial encounter (H) - Chronic   Compression fracture of L1 vertebra, initial encounter (H)       6/28/2020   [facility] EMERGENCY DEPARTMENT     Manny Galvan MD  06/28/20 1936

## 2020-06-29 NOTE — DISCHARGE INSTRUCTIONS
Ice alternating with warm pack to the traumatized and uncomfortable area as needed over the next 2 to 3 days.  May use acetaminophen as baseline pain medication if this is not sufficient use the oxycodone prescribed for breakthrough pain for the next several days.  If you are requiring oxycodone for pain control beyond the next few days please contact your primary care provider for this type of medication.  Return to the emergency department for worse or changing symptoms.

## 2020-07-09 DIAGNOSIS — E78.00 PURE HYPERCHOLESTEROLEMIA: ICD-10-CM

## 2020-07-09 DIAGNOSIS — R60.9 EDEMA, UNSPECIFIED TYPE: ICD-10-CM

## 2020-07-09 DIAGNOSIS — M25.559 HIP PAIN: ICD-10-CM

## 2020-07-09 DIAGNOSIS — E87.6 HYPOKALEMIA: ICD-10-CM

## 2020-07-09 NOTE — PROGRESS NOTES
"Subjective     Loraine Aviles is a 75 year old female who presents to clinic today for the following health issues:    HPI   ED/UC Followup:    Facility:  [facility] ER  Date of visit: 6/28/20  Reason for visit: Closed compression fracture of L2 lumbar vertebra  Current Status: not good.                 Reviewed and updated as needed this visit by Provider         Review of Systems         Objective    LMP 04/02/2004 (Approximate)   There is no height or weight on file to calculate BMI.  Physical Exam               Further history obtained, clarified or corrected by physician:  She continues to have low back pain where she has had an L2 vertebral fracture just a couple weeks ago.  Oxycodone helped a little bit.  The recent steroid use did not seem to help at all.  She does have some pain into her legs mostly the left side into the buttocks and back of the thigh.  No problems with bowel or bladder.    OBJECTIVE:  /70   Pulse 96   Temp 98.1  F (36.7  C) (Tympanic)   Resp 20   Ht 1.689 m (5' 6.5\")   Wt 79.8 kg (176 lb)   LMP 04/02/2004 (Approximate)   SpO2 94%   BMI 27.98 kg/m     GEN: She has obvious discomfort as noted by moving in the chair and standing and sitting trying to find a comfortable situation due to the low back pain.  LUNGS: clear to auscultation, normal breath sounds  CV: RRR without murmur  ABD: BS+, soft, nontender, no masses, no hepatosplenomegaly  EXTREMITIES: without joint tenderness, swelling or erythema.  No muscle tenderness or abnormality.  SKIN: No rashes or abnormalities  NEURO:non focal exam    ASSESSMENT:   Closed fracture of second lumbar vertebra with routine healing, unspecified fracture morphology, subsequent encounter    PLAN:  Orders Placed This Encounter     oxyCODONE (ROXICODONE) 5 MG tablet               "

## 2020-07-10 ENCOUNTER — TELEPHONE (OUTPATIENT)
Dept: FAMILY MEDICINE | Facility: CLINIC | Age: 75
End: 2020-07-10

## 2020-07-10 ENCOUNTER — MEDICAL CORRESPONDENCE (OUTPATIENT)
Dept: HEALTH INFORMATION MANAGEMENT | Facility: CLINIC | Age: 75
End: 2020-07-10

## 2020-07-10 RX ORDER — POTASSIUM CHLORIDE 1500 MG/1
TABLET, EXTENDED RELEASE ORAL
Qty: 30 TABLET | Refills: 11 | Status: SHIPPED | OUTPATIENT
Start: 2020-07-10 | End: 2021-06-24

## 2020-07-10 RX ORDER — ACETAMINOPHEN 500 MG
TABLET ORAL
Qty: 90 TABLET | Refills: 11 | Status: SHIPPED | OUTPATIENT
Start: 2020-07-10 | End: 2021-06-24

## 2020-07-10 RX ORDER — FUROSEMIDE 40 MG
TABLET ORAL
Qty: 30 TABLET | Refills: 11 | Status: SHIPPED | OUTPATIENT
Start: 2020-07-10 | End: 2021-06-24

## 2020-07-10 RX ORDER — FOLIC ACID/MV,IRON,MIN/LUTEIN 0.4-18-25
TABLET ORAL
Qty: 90 TABLET | Refills: 3 | Status: SHIPPED | OUTPATIENT
Start: 2020-07-10 | End: 2022-01-25

## 2020-07-10 RX ORDER — SIMVASTATIN 20 MG
TABLET ORAL
Qty: 30 TABLET | Refills: 11 | Status: SHIPPED | OUTPATIENT
Start: 2020-07-10 | End: 2021-06-24

## 2020-07-10 NOTE — TELEPHONE ENCOUNTER
"Routing refill request to provider for review/approval because:  Her RXs are old - dated 2018 or later 2010  Oyster shell and Certavite are new RXs  Labs not current:  FLP  Patient needs to be seen because it has been more than 1 year since last office visit.      Requested Prescriptions   Pending Prescriptions Disp Refills     CERTAVITE/ANTIOXIDANTS tablet [Pharmacy Med Name: CertaVite/Antioxidants Tablet]  11     Sig: TAKE 1 TABLET BY MOUTH ONCE DAILY.       Vitamin Supplements (Adult) Protocol Passed - 7/9/2020  6:10 PM        Passed - High dose Vitamin D not ordered        Passed - Recent (12 mo) or future (30 days) visit within the authorizing provider's specialty     Patient has had an office visit with the authorizing provider or a provider within the authorizing providers department within the previous 12 mos or has a future within next 30 days. See \"Patient Info\" tab in inbasket, or \"Choose Columns\" in Meds & Orders section of the refill encounter.              Passed - Medication is active on med list           furosemide (LASIX) 40 MG tablet [Pharmacy Med Name: Furosemide 40 MG Tablet] 30 tablet 11     Sig: TAKE 1 TABLET BY MOUTH ONCE DAILY       Diuretics (Including Combos) Protocol Passed - 7/9/2020  6:10 PM        Passed - Blood pressure under 140/90 in past 12 months     BP Readings from Last 3 Encounters:   06/28/20 136/71   06/18/19 126/72   04/02/19 130/72                 Passed - Recent (12 mo) or future (30 days) visit within the authorizing provider's specialty     Patient has had an office visit with the authorizing provider or a provider within the authorizing providers department within the previous 12 mos or has a future within next 30 days. See \"Patient Info\" tab in inbasket, or \"Choose Columns\" in Meds & Orders section of the refill encounter.              Passed - Medication is active on med list        Passed - Patient is age 18 or older        Passed - No active pregancy on record       " " Passed - Normal serum creatinine on file in past 12 months     Recent Labs   Lab Test 03/18/20  1530   CR 0.59              Passed - Normal serum potassium on file in past 12 months     Recent Labs   Lab Test 03/18/20  1530   POTASSIUM 3.7                    Passed - Normal serum sodium on file in past 12 months     Recent Labs   Lab Test 03/18/20  1530                 Passed - No positive pregnancy test in past 12 months           potassium chloride ER (KLOR-CON M) 20 MEQ CR tablet [Pharmacy Med Name: Potassium Chloride Shasha ER 20 MEQ Tablet extended release] 30 tablet 11     Sig: TAKE 1 TABLET BY MOUTH ONCE DAILY       Potassium Supplements Protocol Passed - 7/9/2020  6:10 PM        Passed - Recent (12 mo) or future (30 days) visit within the authorizing provider's department     Patient has had an office visit with the authorizing provider or a provider within the authorizing providers department within the previous 12 mos or has a future within next 30 days. See \"Patient Info\" tab in inbasket, or \"Choose Columns\" in Meds & Orders section of the refill encounter.              Passed - Medication is active on med list        Passed - Patient is age 18 or older        Passed - Normal serum potassium in past 12 months     Recent Labs   Lab Test 03/18/20  1530   POTASSIUM 3.7                       simvastatin (ZOCOR) 20 MG tablet [Pharmacy Med Name: Simvastatin 20 MG Tablet] 30 tablet 11     Sig: TAKE 1 TABLET BY MOUTH AT BEDTIME       Statins Protocol Failed - 7/9/2020  6:10 PM        Failed - LDL on file in past 12 months     Recent Labs   Lab Test 05/22/18  1146   LDL 81             Passed - No abnormal creatine kinase in past 12 months     No lab results found.             Passed - Recent (12 mo) or future (30 days) visit within the authorizing provider's specialty     Patient has had an office visit with the authorizing provider or a provider within the authorizing providers department within the previous " "12 mos or has a future within next 30 days. See \"Patient Info\" tab in inbasket, or \"Choose Columns\" in Meds & Orders section of the refill encounter.              Passed - Medication is active on med list        Passed - Patient is age 18 or older        Passed - No active pregnancy on record        Passed - No positive pregnancy test in past 12 months           acetaminophen (TYLENOL) 500 MG tablet [Pharmacy Med Name: Acetaminophen 500 MG Tablet]  11     Sig: TAKE 2 TABLETS (1000MG) BY MOUTH THREE TIMES DAILY --MAXIMUM OF 4000MG ACETAMINOPHEN IN 24 HOURS--       Analgesics (Non-Narcotic Tylenol and ASA Only) Passed - 7/9/2020  6:10 PM        Passed - Recent (12 mo) or future (30 days) visit within the authorizing provider's specialty     Patient has had an office visit with the authorizing provider or a provider within the authorizing providers department within the previous 12 mos or has a future within next 30 days. See \"Patient Info\" tab in in"[employer]"sket, or \"Choose Columns\" in Meds & Orders section of the refill encounter.              Passed - Patient is 7 months old or older     If patient is a peds patient of the age 7 mos -12 years, ok to refill using weight-based dosing.     If >3g daily and/or sig is not \"prn\", check for liver enzymes. If normal in the last year, ok to refill.  If not, refer to the provider.          Passed - Medication is active on med list           OYSTER SHELL CALCIUM/D 500-200 MG-UNIT tablet [Pharmacy Med Name: Oyster Shell Calcium/D 500-200 MG-UNIT Tablet] 90 tablet 11     Sig: TAKE 1 TABLET BY MOUTH THREE TIMES DAILY.       Vitamin Supplements (Adult) Protocol Passed - 7/9/2020  6:10 PM        Passed - High dose Vitamin D not ordered        Passed - Recent (12 mo) or future (30 days) visit within the authorizing provider's specialty     Patient has had an office visit with the authorizing provider or a provider within the authorizing providers department within the previous 12 mos or " "has a future within next 30 days. See \"Patient Info\" tab in inbasket, or \"Choose Columns\" in Meds & Orders section of the refill encounter.              Passed - Medication is active on med list                     "

## 2020-07-10 NOTE — TELEPHONE ENCOUNTER
Reason for Call: Request for an order or referral:    Order or referral being requested: Med list rec'd from [facility] - Placed on RN's desk to reconcile and then have provider sign.      Date needed: at your convenience    Has the patient been seen by the PCP for this problem? NO    Additional comments:    Call taken on 7/10/2020 at 7:23 AM by Shauna Kent

## 2020-07-11 ENCOUNTER — HOSPITAL ENCOUNTER (EMERGENCY)
Facility: CLINIC | Age: 75
Discharge: HOME | End: 2020-07-11
Attending: NURSE PRACTITIONER | Admitting: NURSE PRACTITIONER
Payer: MEDICARE

## 2020-07-11 VITALS
DIASTOLIC BLOOD PRESSURE: 74 MMHG | TEMPERATURE: 98.6 F | OXYGEN SATURATION: 97 % | BODY MASS INDEX: 27.03 KG/M2 | SYSTOLIC BLOOD PRESSURE: 170 MMHG | RESPIRATION RATE: 18 BRPM | WEIGHT: 170 LBS

## 2020-07-11 DIAGNOSIS — M54.42 ACUTE MIDLINE LOW BACK PAIN WITH BILATERAL SCIATICA: ICD-10-CM

## 2020-07-11 DIAGNOSIS — M54.41 ACUTE MIDLINE LOW BACK PAIN WITH BILATERAL SCIATICA: ICD-10-CM

## 2020-07-11 PROCEDURE — 99284 EMERGENCY DEPT VISIT MOD MDM: CPT | Mod: Z6 | Performed by: NURSE PRACTITIONER

## 2020-07-11 PROCEDURE — 99282 EMERGENCY DEPT VISIT SF MDM: CPT | Performed by: NURSE PRACTITIONER

## 2020-07-11 RX ORDER — PREDNISONE 20 MG/1
TABLET ORAL
Qty: 10 TABLET | Refills: 0 | Status: SHIPPED | OUTPATIENT
Start: 2020-07-11 | End: 2021-06-24

## 2020-07-11 ASSESSMENT — ENCOUNTER SYMPTOMS
CHILLS: 0
NAUSEA: 0
FEVER: 0
COUGH: 0
JOINT SWELLING: 0
NUMBNESS: 0
BACK PAIN: 1
EYE REDNESS: 0
LIGHT-HEADEDNESS: 0
SHORTNESS OF BREATH: 0
MYALGIAS: 0
ABDOMINAL PAIN: 0
NECK PAIN: 0
VOMITING: 0
WEAKNESS: 0
ARTHRALGIAS: 0
FATIGUE: 0
HEADACHES: 0
DIZZINESS: 0
EYE DISCHARGE: 0

## 2020-07-11 NOTE — ED PROVIDER NOTES
History     Chief Complaint   Patient presents with     Back Pain     HPI  Loraine Aviles is a 75 year old female who presents to the emergency department for evaluation of continued low back pain. Patient fell two weeks ago and was diagnosed here with an acute L2 fracture and old L1 fracture. Discharged back to group home with oxycodone and home management instructions. Here today because pain continues to this area but is now having shooting pains down the low back, through the buttock and the posterior thighs. Pain worsens when lying for long periods and going to standing. Has been ambulating safely with walker per baseline. Utilizing ice, heat and acetaminophen. Denies additional injury. No numbness or tingling, no saddle anesthesia or change in bowel/bladder control.     Allergies:  Allergies   Allergen Reactions     Detrol [Tolterodine Tartrate] Other (See Comments)     falls     Ditropan [Oxybutynin Chloride] Other (See Comments)     falls     Medicated Other (See Comments)     Allergic to generic seizure meds.     Problem List:    Patient Active Problem List    Diagnosis Date Noted     Nuclear cataract 10/04/2015     Priority: Medium     Advanced directives, counseling/discussion 09/10/2015     Priority: Medium     Patient has completed an Advance/Health Care Directive (HCD), to bring in copy to be scanned into Epic.    Dominique Olson  September 10, 2015           Health Care Home 11/06/2013     Priority: Medium     Enrolled in [place] [employer], a Blue Plus plan.  AS this member's Care Coordinator they facitate communication and coordinate care across various providers and settings.    Here is a  Summary of this member's most recent Blue Plus Plan of Care:  Behavioral katlin services  Foster Care  Adult day services    This member does not have an advanced directive.    Please call at [phone] for help with this member, especially during times of care transition (such as when  seen for emergency services and when admitted to or discharged from a hospital or nursing facility).    Crystal Reynolds, FRANCISCO CC    See scanned in document.       Benign neoplasm of colon 09/28/2012     Priority: Medium     Benign tubular adenomatous polyps - repeat colon in 5 years       HYPERLIPIDEMIA LDL GOAL <130 10/31/2010     Priority: Medium     Urgency of urination 05/01/2008     Priority: Medium     3/16/11  [facility]  Ham Farrell MD    Assessment: This is a 65 year old female status post IPG replacement doing well.       Delay in development      Priority: Medium     Problem list name updated by automated process. Provider to review       Other urinary incontinence      Priority: Medium     7/2/07  [facility]  Samuel Zhang MD - [phone]  Urge incontinence  Procedures:  1) Stage II interstim implantation                      2) Intraoperative programming       Mild major depression (H)      Priority: Medium     Pt follwed by Dr. Ugalde ( psych)-- seen 2x per year       Fall 11/04/2005     Priority: Medium     Problem list name updated by automated process. Provider to review       Seizure disorder (H) 09/19/2005     Priority: Medium     Edema 09/19/2005     Priority: Medium      Past Medical History:    Past Medical History:   Diagnosis Date     Depressive disorder, not elsewhere classified      Disorder of bone and cartilage, unspecified      Edema      Other convulsions      Other urinary incontinence      Unspecified delay in development(315.9)      Past Surgical History:    Past Surgical History:   Procedure Laterality Date     APPENDECTOMY       BREAST BIOPSY, RT/LT      breast bx     COLONOSCOPY  05/19/00     IMPLANT STIMULATOR AND LEADS SACRAL NERVE (STAGE ONE AND TWO) N/A 1/13/2015    Procedure: IMPLANT STIMULATOR AND LEADS SACRAL NERVE (STAGE ONE AND TWO);  Surgeon: Jami Cheek MD;  [facility]     JOINT REPLACEMTN, KNEE RT/LT Left 10/2014    Joint Replacement knee RT/LT      PHACOEMULSIFICATION WITH STANDARD INTRAOCULAR LENS IMPLANT Right 10/8/2015    Procedure: PHACOEMULSIFICATION WITH STANDARD INTRAOCULAR LENS IMPLANT;  Surgeon: Andrés Brandon MD;  Location: [facility]     REPLACE GENERATOR STIMULATOR (LOCATION) N/A 1/13/2015    Procedure: REPLACE GENERATOR STIMULATOR (LOCATION);  Surgeon: Jami Cheek MD;  [facility]     SURGICAL HISTORY OF -       (R) Hammertoe Surgery--all toes on right foot     SURGICAL HISTORY OF -       IPG replacement      Family History:    Family History   Problem Relation Age of Onset     Family History Negative No family hx of      Social History:  Marital Status:  Single [1]  Social History     Tobacco Use     Smoking status: Never Smoker     Smokeless tobacco: Never Used   Substance Use Topics     Alcohol use: No     Drug use: No      Medications:    predniSONE (DELTASONE) 20 MG tablet  acetaminophen (TYLENOL) 500 MG tablet  alendronate (FOSAMAX) 70 MG tablet  CALCIUM 500 + D PO  carBAMazepine (TEGRETOL) 200 MG tablet  CERTAVITE/ANTIOXIDANTS tablet  divalproex (DEPAKOTE) 500 MG EC tablet  FLUoxetine HCl, PMDD, 10 MG TABS  FLUoxetine HCl, PMDD, 20 MG CAPS  furosemide (LASIX) 40 MG tablet  ibuprofen (ADVIL,MOTRIN) 200 MG tablet  oxyCODONE (ROXICODONE) 5 MG tablet  OYSTER SHELL CALCIUM/D 500-200 MG-UNIT tablet  potassium chloride ER (KLOR-CON M) 20 MEQ CR tablet  simvastatin (ZOCOR) 20 MG tablet      Review of Systems   Constitutional: Negative for chills, fatigue and fever.   Eyes: Negative for discharge and redness.   Respiratory: Negative for cough and shortness of breath.    Cardiovascular: Negative for chest pain.   Gastrointestinal: Negative for abdominal pain, nausea and vomiting.   Musculoskeletal: Positive for back pain. Negative for arthralgias, gait problem, joint swelling, myalgias and neck pain.   Skin: Negative for rash.   Neurological: Negative for dizziness, weakness, light-headedness, numbness and headaches.   All other systems  reviewed and are negative.    Physical Exam   BP: (!) 170/74  Heart Rate: 76  Temp: 98.6  F (37  C)  Resp: 18  Weight: 77.1 kg (170 lb)  SpO2: 97 %    Physical Exam  Constitutional:       General: She is not in acute distress.     Appearance: She is well-developed. She is not diaphoretic.   Neck:      Musculoskeletal: Normal range of motion and neck supple.   Cardiovascular:      Rate and Rhythm: Normal rate and regular rhythm.   Pulmonary:      Effort: Pulmonary effort is normal. No respiratory distress.      Breath sounds: Normal breath sounds and air entry. No decreased air movement. No decreased breath sounds, wheezing or rhonchi.   Abdominal:      General: There is no distension.      Palpations: Abdomen is soft.      Tenderness: There is no abdominal tenderness.   Musculoskeletal: Normal range of motion.        Back:       Comments: Able to recreate sciatic pain with palpation to right lumbar fern-spinal area.   Skin:     General: Skin is warm.      Capillary Refill: Capillary refill takes less than 2 seconds.   Neurological:      Mental Status: She is alert and oriented to person, place, and time.       ED Course        Procedures    No results found for this or any previous visit (from the past 24 hour(s)).    Medications - No data to display    Assessments & Plan (with Medical Decision Making)   Loraine Aviles is a 75 year old female who presents to the emergency department for evaluation of continued low back pain. Patient fell two weeks ago and was diagnosed here with an acute L2 fracture and old L1 fracture. Discharged back to group home with oxycodone and home management instructions. Here today because pain continues to this area but is now having shooting pains down the low back, through the buttock and the posterior thighs. Pain worsens when lying for long periods and going to standing.     Exam as above. No worrisome findings on physical exam. No need for repeat imaging at this time. Findings  consistent with continued pain from recent fracture with the new addition of sciatic pain. Will trial 5 days of prednisone to assess for symptomatic improvement. Patient is not diabetic and has tolerated this in the past. Continue home management, may give prn ibuprofen. Encouraged to follow up with PCP if symptoms persist. Return to ED with new or worsening symptoms. Return precautions reviewed, all questions answered. Patient and caregiver are agreeable to plan of care and patient discharged in no acute distress.     I have reviewed the nursing notes.    I have reviewed the findings, diagnosis, plan and need for follow up with the patient.  Discharge Medication List as of 7/11/2020 11:47 AM      START taking these medications    Details   predniSONE (DELTASONE) 20 MG tablet Take two tablets (= 40mg) each day for 5 (five) days, Disp-10 tablet,R-0, E-Prescribe           Final diagnoses:   Acute midline low back pain with bilateral sciatica     7/11/2020   [facility] EMERGENCY DEPARTMENT     Debbie Chandler, ANALIA CNP  07/11/20 [id number]

## 2020-07-11 NOTE — ED NOTES
Low back pain.  Patient fell two weeks ago and fractured L1 and L2.  Was on oxycodone for 12 days and is now out.  Took acetaminophen at 0700 this morning.  Patient stated that the pain is so bad that she is unable to sleep and has had decreased appetite.

## 2020-07-11 NOTE — DISCHARGE INSTRUCTIONS
Continue home symptom management  May utilize ibuprofen as needed  Return to the emergency department with new or worsening symptoms  Follow up with primary care provider for continued pain

## 2020-07-11 NOTE — ED AVS SNAPSHOT
[facility] Emergency Department  [address] [address]  Phone:  [phone]  Fax:  [phone]                                    Loraine Aviles   MRN: [id number]    Department:  [facility] Emergency Department   Date of Visit:  7/11/2020           After Visit Summary Signature Page    I have received my discharge instructions, and my questions have been answered. I have discussed any challenges I see with this plan with the nurse or doctor.    ..........................................................................................................................................  Patient/Patient Representative Signature      ..........................................................................................................................................  Patient Representative Print Name and Relationship to Patient    ..................................................               ................................................  Date                                   Time    ..........................................................................................................................................  Reviewed by Signature/Title    ...................................................              ..............................................  Date                                               Time          22EPIC Rev 08/18

## 2020-07-13 ENCOUNTER — OFFICE VISIT (OUTPATIENT)
Dept: FAMILY MEDICINE | Facility: CLINIC | Age: 75
End: 2020-07-13
Payer: MEDICARE

## 2020-07-13 VITALS
BODY MASS INDEX: 27.62 KG/M2 | TEMPERATURE: 98.1 F | HEIGHT: 67 IN | SYSTOLIC BLOOD PRESSURE: 130 MMHG | HEART RATE: 96 BPM | WEIGHT: 176 LBS | DIASTOLIC BLOOD PRESSURE: 70 MMHG | OXYGEN SATURATION: 94 % | RESPIRATION RATE: 20 BRPM

## 2020-07-13 DIAGNOSIS — S32.029D CLOSED FRACTURE OF SECOND LUMBAR VERTEBRA WITH ROUTINE HEALING, UNSPECIFIED FRACTURE MORPHOLOGY, SUBSEQUENT ENCOUNTER: Primary | ICD-10-CM

## 2020-07-13 PROCEDURE — 99213 OFFICE O/P EST LOW 20 MIN: CPT | Performed by: FAMILY MEDICINE

## 2020-07-13 RX ORDER — OXYCODONE HYDROCHLORIDE 5 MG/1
5-10 TABLET ORAL EVERY 6 HOURS PRN
Qty: 30 TABLET | Refills: 0 | Status: SHIPPED | OUTPATIENT
Start: 2020-07-13 | End: 2021-06-24

## 2020-07-13 ASSESSMENT — MIFFLIN-ST. JEOR: SCORE: 1313.02

## 2020-07-13 ASSESSMENT — PAIN SCALES - GENERAL: PAINLEVEL: EXTREME PAIN (9)

## 2020-07-13 NOTE — PATIENT INSTRUCTIONS
Thank you for choosing [facility].  You may be receiving an email and/or telephone survey request from [facility] Customer Experience regarding your visit today.  Please take a few minutes to respond to the survey to let us know how we are doing.      If you have questions or concerns, please contact us via [employer] or you can contact your care team at [phone].    Our Clinic hours are:  Monday 6:40 am  to 7:00 pm  Tuesday -Friday 6:40 am to 5:00 pm    The [place] outpatient lab hours are:  Monday - Friday 6:10 am to 4:45 pm  Saturdays 7:00 am to 11:00 am  Appointments are required, call [phone]    If you have clinical questions after hours or would like to schedule an appointment,  call the clinic at [phone].

## 2020-08-31 ENCOUNTER — OFFICE VISIT (OUTPATIENT)
Dept: FAMILY MEDICINE | Facility: CLINIC | Age: 75
End: 2020-08-31
Payer: MEDICARE

## 2020-08-31 VITALS
BODY MASS INDEX: 31.46 KG/M2 | DIASTOLIC BLOOD PRESSURE: 72 MMHG | TEMPERATURE: 97 F | HEART RATE: 66 BPM | OXYGEN SATURATION: 95 % | SYSTOLIC BLOOD PRESSURE: 112 MMHG | WEIGHT: 197.9 LBS

## 2020-08-31 DIAGNOSIS — Z00.00 MEDICARE ANNUAL WELLNESS VISIT, SUBSEQUENT: Primary | ICD-10-CM

## 2020-08-31 DIAGNOSIS — E78.5 HYPERLIPIDEMIA LDL GOAL <130: ICD-10-CM

## 2020-08-31 DIAGNOSIS — F32.0 MILD MAJOR DEPRESSION (H): ICD-10-CM

## 2020-08-31 DIAGNOSIS — Z13.1 SCREENING FOR DIABETES MELLITUS: ICD-10-CM

## 2020-08-31 DIAGNOSIS — G40.909 SEIZURE DISORDER (H): ICD-10-CM

## 2020-08-31 DIAGNOSIS — M81.0 OSTEOPOROSIS, UNSPECIFIED OSTEOPOROSIS TYPE, UNSPECIFIED PATHOLOGICAL FRACTURE PRESENCE: ICD-10-CM

## 2020-08-31 LAB
ALBUMIN SERPL-MCNC: 3.5 G/DL (ref 3.4–5)
ALP SERPL-CCNC: 59 U/L (ref 40–150)
ALT SERPL W P-5'-P-CCNC: 23 U/L (ref 0–50)
ANION GAP SERPL CALCULATED.3IONS-SCNC: 3 MMOL/L (ref 3–14)
AST SERPL W P-5'-P-CCNC: 19 U/L (ref 0–45)
BILIRUB SERPL-MCNC: 0.2 MG/DL (ref 0.2–1.3)
BUN SERPL-MCNC: 16 MG/DL (ref 7–30)
CALCIUM SERPL-MCNC: 8.9 MG/DL (ref 8.5–10.1)
CHLORIDE SERPL-SCNC: 103 MMOL/L (ref 94–109)
CO2 SERPL-SCNC: 30 MMOL/L (ref 20–32)
CREAT SERPL-MCNC: 0.6 MG/DL (ref 0.52–1.04)
ERYTHROCYTE [DISTWIDTH] IN BLOOD BY AUTOMATED COUNT: 11.9 % (ref 10–15)
GFR SERPL CREATININE-BSD FRML MDRD: 89 ML/MIN/{1.73_M2}
GLUCOSE SERPL-MCNC: 96 MG/DL (ref 70–99)
HCT VFR BLD AUTO: 46.1 % (ref 35–47)
HGB BLD-MCNC: 15.5 G/DL (ref 11.7–15.7)
MCH RBC QN AUTO: 32.6 PG (ref 26.5–33)
MCHC RBC AUTO-ENTMCNC: 33.6 G/DL (ref 31.5–36.5)
MCV RBC AUTO: 97 FL (ref 78–100)
PLATELET # BLD AUTO: 192 10E9/L (ref 150–450)
POTASSIUM SERPL-SCNC: 4 MMOL/L (ref 3.4–5.3)
PROT SERPL-MCNC: 7.1 G/DL (ref 6.8–8.8)
RBC # BLD AUTO: 4.76 10E12/L (ref 3.8–5.2)
SODIUM SERPL-SCNC: 136 MMOL/L (ref 133–144)
VALPROATE SERPL-MCNC: 32 MG/L (ref 50–100)
WBC # BLD AUTO: 4.3 10E9/L (ref 4–11)

## 2020-08-31 PROCEDURE — 80053 COMPREHEN METABOLIC PANEL: CPT | Performed by: NURSE PRACTITIONER

## 2020-08-31 PROCEDURE — G0439 PPPS, SUBSEQ VISIT: HCPCS | Performed by: NURSE PRACTITIONER

## 2020-08-31 PROCEDURE — 80164 ASSAY DIPROPYLACETIC ACD TOT: CPT | Performed by: NURSE PRACTITIONER

## 2020-08-31 PROCEDURE — 36415 COLL VENOUS BLD VENIPUNCTURE: CPT | Performed by: NURSE PRACTITIONER

## 2020-08-31 PROCEDURE — 85027 COMPLETE CBC AUTOMATED: CPT | Performed by: NURSE PRACTITIONER

## 2020-08-31 NOTE — LETTER
September 1, 2020      Loraine Aviles  [address] [facility] [place] [address]        Dear ,    We are writing to inform you of your test results.    Labs stable other than low Depakote level but if stable, no changes in medications at this time.   Continue with same medications and monitoring. Repeat labs in six months.     Resulted Orders   Comprehensive metabolic panel (BMP + Alb, Alk Phos, ALT, AST, Total. Bili, TP)   Result Value Ref Range    Sodium 136 133 - 144 mmol/L    Potassium 4.0 3.4 - 5.3 mmol/L    Chloride 103 94 - 109 mmol/L    Carbon Dioxide 30 20 - 32 mmol/L    Anion Gap 3 3 - 14 mmol/L    Glucose 96 70 - 99 mg/dL    Urea Nitrogen 16 7 - 30 mg/dL    Creatinine 0.60 0.52 - 1.04 mg/dL    GFR Estimate 89 >60 mL/min/[1.73_m2]      Comment:      Non  GFR Calc  Starting 12/18/2018, serum creatinine based estimated GFR (eGFR) will be   calculated using the Chronic Kidney Disease Epidemiology Collaboration   (CKD-EPI) equation.      GFR Estimate If Black >90 >60 mL/min/[1.73_m2]      Comment:       GFR Calc  Starting 12/18/2018, serum creatinine based estimated GFR (eGFR) will be   calculated using the Chronic Kidney Disease Epidemiology Collaboration   (CKD-EPI) equation.      Calcium 8.9 8.5 - 10.1 mg/dL    Bilirubin Total 0.2 0.2 - 1.3 mg/dL    Albumin 3.5 3.4 - 5.0 g/dL    Protein Total 7.1 6.8 - 8.8 g/dL    Alkaline Phosphatase 59 40 - 150 U/L    ALT 23 0 - 50 U/L    AST 19 0 - 45 U/L   CBC with platelets   Result Value Ref Range    WBC 4.3 4.0 - 11.0 10e9/L    RBC Count 4.76 3.8 - 5.2 10e12/L    Hemoglobin 15.5 11.7 - 15.7 g/dL    Hematocrit 46.1 35.0 - 47.0 %    MCV 97 78 - 100 fl    MCH 32.6 26.5 - 33.0 pg    MCHC 33.6 31.5 - 36.5 g/dL    RDW 11.9 10.0 - 15.0 %    Platelet Count 192 150 - 450 10e9/L   Valproic acid   Result Value Ref Range    Valproic Acid Level 32 (L) 50 - 100 mg/L       If you have any questions or concerns, please call the clinic  at the number listed above.       Sincerely,        ANALIA Chavis CNP

## 2020-08-31 NOTE — PROGRESS NOTES
"SUBJECTIVE:   Loraine Aviles is a 75 year old female who presents for Preventive Visit.    Are you in the first 12 months of your Medicare coverage?  No    HPI  Do you feel safe in your environment? Yes    Have you ever done Advance Care Planning? (For example, a Health Directive, POLST, or a discussion with a medical provider or your loved ones about your wishes): No, advance care planning information given to patient to review.  Patient declined advance care planning discussion at this time.      Fall risk       Cognitive Screening Not appropriate due to mental handicap    Do you have sleep apnea, excessive snoring or daytime drowsiness?: no    Reviewed and updated as needed this visit by clinical staff  Tobacco  Allergies  Meds  Med Hx  Surg Hx  Fam Hx  Soc Hx        Reviewed and updated as needed this visit by Provider        Social History     Tobacco Use     Smoking status: Never Smoker     Smokeless tobacco: Never Used   Substance Use Topics     Alcohol use: No     If you drink alcohol do you typically have >3 drinks per day or >7 drinks per week? Not applicable    Alcohol Use 6/18/2019   Prescreen: >3 drinks/day or >7 drinks/week? No       Accompanied by care giver from the resident home she has lived at for \"years\"  Needs yearly paperwork updated.   She has one question, why she is taking Tylenol daily and was told it is likely due to arthritic and osteoporotic pain.  No other acute concerns, is otherwise well.            Current providers sharing in care for this patient include:   Patient Care Team:  Carlos Servin MD as PCP - General (Family Practice)  Carlos Servin MD as Assigned PCP    The following health maintenance items are reviewed in Epic and correct as of today:  Health Maintenance   Topic Date Due     ZOSTER IMMUNIZATION (1 of 2) 07/09/1994     MEDICARE ANNUAL WELLNESS VISIT  06/18/2020     FALL RISK ASSESSMENT  06/18/2020     INFLUENZA VACCINE (1) 09/01/2020     ADVANCE CARE " PLANNING  09/10/2020     COLORECTAL CANCER SCREENING  09/25/2022     DTAP/TDAP/TD IMMUNIZATION (4 - Td) 03/26/2023     LIPID  05/22/2023     DEXA  Completed     PNEUMOCOCCAL IMMUNIZATION 65+ LOW/MEDIUM RISK  Completed     IPV IMMUNIZATION  Aged Out     MENINGITIS IMMUNIZATION  Aged Out     HEPATITIS B IMMUNIZATION  Aged Out     BP Readings from Last 3 Encounters:   08/31/20 112/72   07/13/20 130/70   07/11/20 (!) 170/74    Wt Readings from Last 3 Encounters:   08/31/20 89.8 kg (197 lb 14.4 oz)   07/13/20 79.8 kg (176 lb)   07/11/20 77.1 kg (170 lb)                  Patient Active Problem List   Diagnosis     Seizure disorder (H)     Edema     Fall     Delay in development     Other urinary incontinence     Mild major depression (H)     Urgency of urination     HYPERLIPIDEMIA LDL GOAL <130     Benign neoplasm of colon     Health Care Home     Advanced directives, counseling/discussion     Nuclear cataract     Past Surgical History:   Procedure Laterality Date     APPENDECTOMY       BREAST BIOPSY, RT/LT      breast bx     COLONOSCOPY  05/19/00     IMPLANT STIMULATOR AND LEADS SACRAL NERVE (STAGE ONE AND TWO) N/A 1/13/2015    Procedure: IMPLANT STIMULATOR AND LEADS SACRAL NERVE (STAGE ONE AND TWO);  Surgeon: Jami Cheek MD;  [facility]     JOINT REPLACEMTN, KNEE RT/LT Left 10/2014    Joint Replacement knee RT/LT     PHACOEMULSIFICATION WITH STANDARD INTRAOCULAR LENS IMPLANT Right 10/8/2015    Procedure: PHACOEMULSIFICATION WITH STANDARD INTRAOCULAR LENS IMPLANT;  Surgeon: Andrés Brandon MD;  Location: [facility] OR     REPLACE GENERATOR STIMULATOR (LOCATION) N/A 1/13/2015    Procedure: REPLACE GENERATOR STIMULATOR (LOCATION);  Surgeon: Jami Cheek MD;  [facility]     SURGICAL HISTORY OF -       (R) Hammertoe Surgery--all toes on right foot     SURGICAL HISTORY OF -       IPG replacement        Social History     Tobacco Use     Smoking status: Never Smoker     Smokeless tobacco: Never Used    Substance Use Topics     Alcohol use: No     Family History   Problem Relation Age of Onset     Family History Negative No family hx of          Current Outpatient Medications   Medication Sig Dispense Refill     acetaminophen (TYLENOL) 500 MG tablet TAKE 2 TABLETS (1000MG) BY MOUTH THREE TIMES DAILY --MAXIMUM OF 4000MG ACETAMINOPHEN IN 24 HOURS-- 90 tablet 11     alendronate (FOSAMAX) 70 MG tablet Take 1 tablet (70 mg) by mouth once a week 12 tablet 3     CALCIUM 500 + D PO 1 TABLET ORALLY 3 TIMES DAILY       carBAMazepine (TEGRETOL) 200 MG tablet Take 1.5 tablets (300 mg) by mouth 2 times daily 200 mg in Am and 300 mg in the  tablet 3     CERTAVITE/ANTIOXIDANTS tablet TAKE 1 TABLET BY MOUTH ONCE DAILY. 90 tablet 3     divalproex (DEPAKOTE) 500 MG EC tablet Take 1 tablet by mouth 3 times daily. 120 tablet 0     FLUoxetine HCl, PMDD, 10 MG TABS Take 10 mg by mouth daily 90 tablet 3     furosemide (LASIX) 40 MG tablet TAKE 1 TABLET BY MOUTH ONCE DAILY 30 tablet 11     ibuprofen (ADVIL,MOTRIN) 200 MG tablet Take 200 mg by mouth every 4 hours as needed for mild pain       OYSTER SHELL CALCIUM/D 500-200 MG-UNIT tablet TAKE 1 TABLET BY MOUTH THREE TIMES DAILY. 90 tablet 11     potassium chloride ER (KLOR-CON M) 20 MEQ CR tablet TAKE 1 TABLET BY MOUTH ONCE DAILY 30 tablet 11     simvastatin (ZOCOR) 20 MG tablet TAKE 1 TABLET BY MOUTH AT BEDTIME 30 tablet 11     FLUoxetine HCl, PMDD, 20 MG CAPS Take 20 mg by mouth daily (Patient not taking: Reported on 8/31/2020) 90 capsule 3     oxyCODONE (ROXICODONE) 5 MG tablet Take 1-2 tablets (5-10 mg) by mouth every 6 hours as needed for pain (Patient not taking: Reported on 8/31/2020) 30 tablet 0     predniSONE (DELTASONE) 20 MG tablet Take two tablets (= 40mg) each day for 5 (five) days (Patient not taking: Reported on 8/31/2020) 10 tablet 0     Allergies   Allergen Reactions     Detrol [Tolterodine Tartrate] Other (See Comments)     falls     Ditropan [Oxybutynin Chloride]  "Other (See Comments)     falls     Medicated Other (See Comments)     Allergic to generic seizure meds.     Recent Labs   Lab Test 03/18/20  1530 09/12/18  1530 05/22/18  1146 06/20/17  1352 04/06/16  1614 02/25/15  0755   LDL  --   --  81  --  Cannot estimate LDL when triglyceride exceeds 400 mg/dL  98 100   HDL  --   --  67  --  61 71   TRIG  --   --  264*  --  428* 234*   ALT 26 21  --  19 23  --    CR 0.59 0.65  --  0.60  --   --    GFRESTIMATED 89 90  --  >90  Non  GFR Calc    --   --    GFRESTBLACK >90 >90  --  >90  African American GFR Calc    --   --    POTASSIUM 3.7 4.0  --  4.4  --   --    TSH 1.50 1.42  --   --   --   --           Review of Systems  Constitutional, HEENT, cardiovascular, pulmonary, gi and gu systems are negative, except as otherwise noted.    OBJECTIVE:   /72 (Cuff Size: Adult Regular)   Pulse 66   Temp 97  F (36.1  C) (Tympanic)   Wt 89.8 kg (197 lb 14.4 oz)   LMP 04/02/2004 (Approximate)   SpO2 95%   BMI 31.46 kg/m   Estimated body mass index is 31.46 kg/m  as calculated from the following:    Height as of 7/13/20: 1.689 m (5' 6.5\").    Weight as of this encounter: 89.8 kg (197 lb 14.4 oz).  Physical Exam  GENERAL: healthy, alert and no distress  EYES: Eyes grossly normal to inspection, and conjunctivae and sclerae normal  NECK: no adenopathy, no asymmetry, masses, or scars and thyroid normal to palpation  RESP: lungs clear to auscultation - no rales, rhonchi or wheezes  CV: regular rate and rhythm  ABDOMEN: soft, nontender  MS: no gross musculoskeletal defects noted, using walker  SKIN: no suspicious lesions or rashes  PSYCH: mentation appears normal, affect normal/bright    Diagnostic Test Results:  Labs reviewed in Epic  No results found for this or any previous visit (from the past 24 hour(s)).    ASSESSMENT / PLAN:   1. Medicare annual wellness visit, subsequent      2. Seizure disorder (H)    - Comprehensive metabolic panel (BMP + Alb, Alk Phos, ALT, " "AST, Total. Bili, TP)  - CBC with platelets  - Valproic acid  Stable, no concerns, continue same medication, lab pending.    3. Hyperlipidemia LDL goal <130    - Comprehensive metabolic panel (BMP + Alb, Alk Phos, ALT, AST, Total. Bili, TP)    4. Mild major depression (H)    Stable, continue same.    5. Osteoporosis, unspecified osteoporosis type, unspecified pathological fracture presence      6. Screening for diabetes mellitus    - Comprehensive metabolic panel (BMP + Alb, Alk Phos, ALT, AST, Total. Bili, TP)    COUNSELING:  Reviewed preventive health counseling, as reflected in patient instructions       Regular exercise       Healthy diet/nutrition    Estimated body mass index is 31.46 kg/m  as calculated from the following:    Height as of 7/13/20: 1.689 m (5' 6.5\").    Weight as of this encounter: 89.8 kg (197 lb 14.4 oz).    Weight management plan: Discussed healthy diet and exercise guidelines    She reports that she has never smoked. She has never used smokeless tobacco.      Appropriate preventive services were discussed with this patient, including applicable screening as appropriate for cardiovascular disease, diabetes, osteopenia/osteoporosis, and glaucoma.  As appropriate for age/gender, discussed screening for colorectal cancer, prostate cancer, breast cancer, and cervical cancer. Checklist reviewing preventive services available has been given to the patient.    Reviewed patients plan of care and provided an AVS. The Basic Care Plan (routine screening as documented in Health Maintenance) for Loraine meets the Care Plan requirement. This Care Plan has been established and reviewed with the Patient.    Counseling Resources:  ATP IV Guidelines  Pooled Cohorts Equation Calculator  Breast Cancer Risk Calculator  Breast Cancer: Medication to Reduce Risk  FRAX Risk Assessment  ICSI Preventive Guidelines  Dietary Guidelines for Americans, 2010  USDA's MyPlate  ASA Prophylaxis  Lung CA Screening    Lorena " ANALIA García [facility]    Identified Health Risks:

## 2020-08-31 NOTE — PATIENT INSTRUCTIONS
Will be notified of pending labs.  Continue all present medications.      Our Clinic hours are:  Mondays    7:20 am - 7 pm  Tues -  Fri  7:20 am - 5 pm    Clinic Phone: [phone]    The clinic lab opens at 7:30 am Mon - Fri and appointments are required.    [facility] [place]  Ph. [phone]  Monday  8 am - 7pm  Tues - Fri 8 am - 5:30 pm         Patient Education   Personalized Prevention Plan  You are due for the preventive services outlined below.  Your care team is available to assist you in scheduling these services.  If you have already completed any of these items, please share that information with your care team to update in your medical record.  Health Maintenance Due   Topic Date Due     Zoster (Shingles) Vaccine (1 of 2) 07/09/1994     Annual Wellness Visit  06/18/2020     FALL RISK ASSESSMENT  06/18/2020     Flu Vaccine (1) 09/01/2020     Discuss Advance Care Planning  09/10/2020

## 2021-03-01 ENCOUNTER — TELEPHONE (OUTPATIENT)
Dept: FAMILY MEDICINE | Facility: CLINIC | Age: 76
End: 2021-03-01

## 2021-03-01 NOTE — TELEPHONE ENCOUNTER
Reason for Call: Request for an order or referral:    Order or referral being requested: Orders for pull-ups placed on RAYMUNDOZeensharebaylee's desk for signing     Date needed: at your convenience    Has the patient been seen by the PCP for this problem? NO    Additional comments:       Call taken on 3/1/2021 at 4:14 PM by Shauna Ervin

## 2021-03-15 ENCOUNTER — TELEPHONE (OUTPATIENT)
Dept: FAMILY MEDICINE | Facility: CLINIC | Age: 76
End: 2021-03-15

## 2021-03-15 DIAGNOSIS — R62.50 DELAY IN DEVELOPMENT: Primary | ICD-10-CM

## 2021-03-15 DIAGNOSIS — N39.498 OTHER URINARY INCONTINENCE: ICD-10-CM

## 2021-03-15 DIAGNOSIS — R39.15 URGENCY OF URINATION: ICD-10-CM

## 2021-03-15 NOTE — TELEPHONE ENCOUNTER
DME pended. Routed to provider to sign. Will need to fax order to [facility] at [phone].  Addis Staples RN

## 2021-03-15 NOTE — TELEPHONE ENCOUNTER
Reason for Call: Request for an order or referral:    Order or referral being requested: Order request from [facility] placed on RN's desk to queue up orders place.      Date needed: at your convenience    Has the patient been seen by the PCP for this problem? NO    Additional comments:     Call taken on 3/15/2021 at 8:49 AM by Shauna Ervin

## 2021-03-24 NOTE — TELEPHONE ENCOUNTER
[facility] called - They need recent DME order for incontinence products changed from quanity 360 to prn so that it can be refilled all year.  Please refax Rx to [phone].  No need to call [facility] back, unless there are questions or problems.

## 2021-05-11 ENCOUNTER — TELEPHONE (OUTPATIENT)
Dept: FAMILY MEDICINE | Facility: CLINIC | Age: 76
End: 2021-05-11

## 2021-05-11 DIAGNOSIS — Z00.00 MEDICARE ANNUAL WELLNESS VISIT, SUBSEQUENT: Primary | ICD-10-CM

## 2021-05-11 RX ORDER — MULTIVIT-MIN/IRON FUM/FOLIC AC 7.5 MG-4
1 TABLET ORAL DAILY
Qty: 90 TABLET | Refills: 0 | Status: SHIPPED | OUTPATIENT
Start: 2021-05-11

## 2021-05-11 NOTE — TELEPHONE ENCOUNTER
certavite on backorder - generic multivitamin prescription pended.  Please advise.      Crystal Lechuga RN

## 2021-05-11 NOTE — TELEPHONE ENCOUNTER
Reason for Call:  Other prescription    Detailed comments: the pharmacy is asking for a substitution for certavite as this is on back order. They state they have faxed us this request previously    Phone Number Patient can be reached at: Home number on file [phone]    Best Time: any    Can we leave a detailed message on this number? YES    Call taken on 5/11/2021 at 12:51 PM by Sara Burgos

## 2021-06-24 ENCOUNTER — OFFICE VISIT (OUTPATIENT)
Dept: FAMILY MEDICINE | Facility: CLINIC | Age: 76
End: 2021-06-24
Payer: MEDICARE

## 2021-06-24 VITALS
BODY MASS INDEX: 27.66 KG/M2 | WEIGHT: 174 LBS | RESPIRATION RATE: 20 BRPM | DIASTOLIC BLOOD PRESSURE: 82 MMHG | SYSTOLIC BLOOD PRESSURE: 118 MMHG | TEMPERATURE: 97.9 F | OXYGEN SATURATION: 97 % | HEART RATE: 64 BPM

## 2021-06-24 DIAGNOSIS — M81.0 OSTEOPOROSIS, UNSPECIFIED OSTEOPOROSIS TYPE, UNSPECIFIED PATHOLOGICAL FRACTURE PRESENCE: ICD-10-CM

## 2021-06-24 DIAGNOSIS — F31.9 BIPOLAR DISORDER (H): Primary | ICD-10-CM

## 2021-06-24 DIAGNOSIS — B35.1 ONYCHOMYCOSIS: ICD-10-CM

## 2021-06-24 DIAGNOSIS — G40.909 SEIZURE DISORDER (H): Primary | ICD-10-CM

## 2021-06-24 DIAGNOSIS — E87.6 HYPOKALEMIA: ICD-10-CM

## 2021-06-24 DIAGNOSIS — R60.9 EDEMA, UNSPECIFIED TYPE: ICD-10-CM

## 2021-06-24 DIAGNOSIS — E78.00 PURE HYPERCHOLESTEROLEMIA: ICD-10-CM

## 2021-06-24 DIAGNOSIS — F32.0 MILD MAJOR DEPRESSION (H): ICD-10-CM

## 2021-06-24 DIAGNOSIS — M25.559 HIP PAIN: ICD-10-CM

## 2021-06-24 LAB
ALBUMIN SERPL-MCNC: 3.2 G/DL (ref 3.4–5)
ALP SERPL-CCNC: 46 U/L (ref 40–150)
ALT SERPL W P-5'-P-CCNC: 20 U/L (ref 0–50)
ANION GAP SERPL CALCULATED.3IONS-SCNC: 7 MMOL/L (ref 3–14)
AST SERPL W P-5'-P-CCNC: 15 U/L (ref 0–45)
BASOPHILS # BLD AUTO: 0 10E9/L (ref 0–0.2)
BASOPHILS NFR BLD AUTO: 0.5 %
BILIRUB SERPL-MCNC: 0.2 MG/DL (ref 0.2–1.3)
BUN SERPL-MCNC: 17 MG/DL (ref 7–30)
CALCIUM SERPL-MCNC: 9.1 MG/DL (ref 8.5–10.1)
CHLORIDE SERPL-SCNC: 101 MMOL/L (ref 94–109)
CHOLEST SERPL-MCNC: 233 MG/DL
CO2 SERPL-SCNC: 31 MMOL/L (ref 20–32)
CREAT SERPL-MCNC: 0.69 MG/DL (ref 0.52–1.04)
DIFFERENTIAL METHOD BLD: ABNORMAL
EOSINOPHIL # BLD AUTO: 0.1 10E9/L (ref 0–0.7)
EOSINOPHIL NFR BLD AUTO: 2.3 %
ERYTHROCYTE [DISTWIDTH] IN BLOOD BY AUTOMATED COUNT: 11.7 % (ref 10–15)
GFR SERPL CREATININE-BSD FRML MDRD: 84 ML/MIN/{1.73_M2}
GLUCOSE SERPL-MCNC: 100 MG/DL (ref 70–99)
HCT VFR BLD AUTO: 44.4 % (ref 35–47)
HDLC SERPL-MCNC: 80 MG/DL
HGB BLD-MCNC: 15.1 G/DL (ref 11.7–15.7)
LDLC SERPL CALC-MCNC: 95 MG/DL
LYMPHOCYTES # BLD AUTO: 2.6 10E9/L (ref 0.8–5.3)
LYMPHOCYTES NFR BLD AUTO: 43.6 %
MCH RBC QN AUTO: 33.1 PG (ref 26.5–33)
MCHC RBC AUTO-ENTMCNC: 34 G/DL (ref 31.5–36.5)
MCV RBC AUTO: 97 FL (ref 78–100)
MONOCYTES # BLD AUTO: 0.6 10E9/L (ref 0–1.3)
MONOCYTES NFR BLD AUTO: 9.9 %
NEUTROPHILS # BLD AUTO: 2.7 10E9/L (ref 1.6–8.3)
NEUTROPHILS NFR BLD AUTO: 43.7 %
NONHDLC SERPL-MCNC: 153 MG/DL
PLATELET # BLD AUTO: 163 10E9/L (ref 150–450)
POTASSIUM SERPL-SCNC: 4.5 MMOL/L (ref 3.4–5.3)
PROT SERPL-MCNC: 6.7 G/DL (ref 6.8–8.8)
RBC # BLD AUTO: 4.56 10E12/L (ref 3.8–5.2)
SODIUM SERPL-SCNC: 139 MMOL/L (ref 133–144)
TRIGL SERPL-MCNC: 291 MG/DL
TSH SERPL DL<=0.005 MIU/L-ACNC: 1.14 MU/L (ref 0.4–4)
VALPROATE SERPL-MCNC: 58 MG/L (ref 50–100)
WBC # BLD AUTO: 6.1 10E9/L (ref 4–11)

## 2021-06-24 PROCEDURE — 99214 OFFICE O/P EST MOD 30 MIN: CPT | Performed by: PHYSICIAN ASSISTANT

## 2021-06-24 PROCEDURE — 36415 COLL VENOUS BLD VENIPUNCTURE: CPT | Performed by: PHYSICIAN ASSISTANT

## 2021-06-24 PROCEDURE — 80164 ASSAY DIPROPYLACETIC ACD TOT: CPT | Performed by: PSYCHIATRY & NEUROLOGY

## 2021-06-24 PROCEDURE — 85025 COMPLETE CBC W/AUTO DIFF WBC: CPT | Performed by: PSYCHIATRY & NEUROLOGY

## 2021-06-24 PROCEDURE — 80053 COMPREHEN METABOLIC PANEL: CPT | Performed by: PSYCHIATRY & NEUROLOGY

## 2021-06-24 PROCEDURE — 84443 ASSAY THYROID STIM HORMONE: CPT | Performed by: PSYCHIATRY & NEUROLOGY

## 2021-06-24 PROCEDURE — 80061 LIPID PANEL: CPT | Performed by: PHYSICIAN ASSISTANT

## 2021-06-24 RX ORDER — POTASSIUM CHLORIDE 1500 MG/1
20 TABLET, EXTENDED RELEASE ORAL DAILY
Qty: 30 TABLET | Refills: 11 | Status: SHIPPED | OUTPATIENT
Start: 2021-06-24 | End: 2022-10-05

## 2021-06-24 RX ORDER — FUROSEMIDE 40 MG
40 TABLET ORAL DAILY
Qty: 30 TABLET | Refills: 11 | Status: SHIPPED | OUTPATIENT
Start: 2021-06-24 | End: 2022-10-05

## 2021-06-24 RX ORDER — ACETAMINOPHEN 500 MG
TABLET ORAL
Qty: 90 TABLET | Refills: 11 | Status: SHIPPED | OUTPATIENT
Start: 2021-06-24 | End: 2022-10-05

## 2021-06-24 RX ORDER — SIMVASTATIN 20 MG
TABLET ORAL
Qty: 30 TABLET | Refills: 11 | Status: SHIPPED | OUTPATIENT
Start: 2021-06-24 | End: 2022-10-05

## 2021-06-24 NOTE — PROGRESS NOTES
"Assessment & Plan   Seizure disorder (H)  Stable on Depakote.  Recent level was checked by psychiatry and was within normal limits. Continue current medication regimen.    Mild major depression (H)  Stable.  Continue current medication regimen.    Osteoporosis, unspecified osteoporosis type, unspecified pathological fracture presence  Stable.  Unclear if she still taking alendronate.  Her last DEXA scan was in 2011.  Regardless of whether she is taking alendronate or not she does need a follow-up DEXA scan to be done in the near future.    Hip pain  Stable.  Continue Tylenol.  - acetaminophen (TYLENOL) 500 MG tablet; TAKE 2 TABLETS (1000MG) BY MOUTH THREE TIMES DAILY --MAXIMUM OF 4000MG ACETAMINOPHEN IN 24 HOURS--  - calcium carbonate-vitamin D (OYSTER SHELL CALCIUM/D) 500-200 MG-UNIT tablet; TAKE 1 TABLET BY MOUTH THREE TIMES DAILY.    Edema, unspecified type  Intermittent and stable.  Takes Lasix.  Has labs due from her psychiatrist.  - furosemide (LASIX) 40 MG tablet; Take 1 tablet (40 mg) by mouth daily    Hypokalemia  Refill potassium supplement today.  Recheck BMP per psychiatry.  - potassium chloride ER (KLOR-CON M) 20 MEQ CR tablet; Take 1 tablet (20 mEq) by mouth daily    Pure hypercholesterolemia  Due for lipid panel.  Lipid panel was elevated but her LDL is under 100.  We will continue simvastatin at this time.  Recheck lipid panel in 1 year.  - simvastatin (ZOCOR) 20 MG tablet; TAKE 1 TABLET BY MOUTH AT BEDTIME  - Lipid panel reflex to direct LDL Fasting    Onychomycosis  Patient needs assistance with toenail trimming and treatment of onychomycosis.  Referral to podiatry sent today.  - Orthopedic  Referral; Future    BMI:   Estimated body mass index is 27.66 kg/m  as calculated from the following:    Height as of 7/13/20: 1.689 m (5' 6.5\").    Weight as of this encounter: 78.9 kg (174 lb).     Return in about 6 months (around 12/24/2021) for Physical Exam.    [facility]    Paola Baron is a 75 year old who presents for the following health issues who presents with caregiver from group home    HPI   New Patient/Transfer of Care  -Transfer from Dr. Servin   -Sign orders for her group home.  She has been a member there for many years and is stable.  Needs meds refilled  -Labs for Dr. Ugalde(psych)  -referral for podiatry, 2nd toe left foot    Review of Systems   See HPI      Objective    LMP 04/02/2004 (Approximate)   There is no height or weight on file to calculate BMI.  Physical Exam   Constitutional: healthy, alert, and no distress  Head: Normocephalic. Atraumatic  Eyes: No conjunctival injection, sclera anicteric  Cardiovascular: RRR. No murmurs, clicks, gallops, or rubs. No peripheral edema.   Respiratory: No resp distress. Lungs CTAB bilaterally.   Musculoskeletal: extremities normal- no gross deformities noted, and normal muscle tone  Skin: no suspicious lesions or rashes  Neurologic: Gait normal. CN 2-12 grossly intact  Psychiatric: mentation appears normal and affect normal/bright      Results for orders placed or performed in visit on 06/24/21   Valproic acid     Status: None   Result Value Ref Range    Valproic Acid Level 58 50 - 100 mg/L   Comprehensive metabolic panel (BMP + Alb, Alk Phos, ALT, AST, Total. Bili, TP)     Status: Abnormal   Result Value Ref Range    Sodium 139 133 - 144 mmol/L    Potassium 4.5 3.4 - 5.3 mmol/L    Chloride 101 94 - 109 mmol/L    Carbon Dioxide 31 20 - 32 mmol/L    Anion Gap 7 3 - 14 mmol/L    Glucose 100 (H) 70 - 99 mg/dL    Urea Nitrogen 17 7 - 30 mg/dL    Creatinine 0.69 0.52 - 1.04 mg/dL    GFR Estimate 84 >60 mL/min/[1.73_m2]    GFR Estimate If Black >90 >60 mL/min/[1.73_m2]    Calcium 9.1 8.5 - 10.1 mg/dL    Bilirubin Total 0.2 0.2 - 1.3 mg/dL    Albumin 3.2 (L) 3.4 - 5.0 g/dL    Protein Total 6.7 (L) 6.8 - 8.8 g/dL    Alkaline Phosphatase 46 40 - 150 U/L    ALT 20 0 - 50 U/L    AST 15 0 - 45 U/L    CBC with platelets and differential     Status: Abnormal   Result Value Ref Range    WBC 6.1 4.0 - 11.0 10e9/L    RBC Count 4.56 3.8 - 5.2 10e12/L    Hemoglobin 15.1 11.7 - 15.7 g/dL    Hematocrit 44.4 35.0 - 47.0 %    MCV 97 78 - 100 fl    MCH 33.1 (H) 26.5 - 33.0 pg    MCHC 34.0 31.5 - 36.5 g/dL    RDW 11.7 10.0 - 15.0 %    Platelet Count 163 150 - 450 10e9/L    % Neutrophils 43.7 %    % Lymphocytes 43.6 %    % Monocytes 9.9 %    % Eosinophils 2.3 %    % Basophils 0.5 %    Absolute Neutrophil 2.7 1.6 - 8.3 10e9/L    Absolute Lymphocytes 2.6 0.8 - 5.3 10e9/L    Absolute Monocytes 0.6 0.0 - 1.3 10e9/L    Absolute Eosinophils 0.1 0.0 - 0.7 10e9/L    Absolute Basophils 0.0 0.0 - 0.2 10e9/L    Diff Method Automated Method    TSH     Status: None   Result Value Ref Range    TSH 1.14 0.40 - 4.00 mU/L   Results for orders placed or performed in visit on 06/24/21   Lipid panel reflex to direct LDL Fasting     Status: Abnormal   Result Value Ref Range    Cholesterol 233 (H) <200 mg/dL    Triglycerides 291 (H) <150 mg/dL    HDL Cholesterol 80 >49 mg/dL    LDL Cholesterol Calculated 95 <100 mg/dL    Non HDL Cholesterol 153 (H) <130 mg/dL

## 2021-06-24 NOTE — PATIENT INSTRUCTIONS
I have refilled all your medicines today except for alendronate because I am not quite sure if you are taking this.  I referred you to the podiatrist for assistance with your toenails.    Your labs are pending and we will follow up with those results once they are in.

## 2021-07-15 ENCOUNTER — OFFICE VISIT (OUTPATIENT)
Dept: PODIATRY | Facility: CLINIC | Age: 76
End: 2021-07-15
Payer: MEDICARE

## 2021-07-15 VITALS
DIASTOLIC BLOOD PRESSURE: 76 MMHG | HEIGHT: 65 IN | BODY MASS INDEX: 28.99 KG/M2 | SYSTOLIC BLOOD PRESSURE: 153 MMHG | WEIGHT: 174 LBS

## 2021-07-15 DIAGNOSIS — B35.1 PAIN DUE TO ONYCHOMYCOSIS OF TOENAILS OF BOTH FEET: Primary | ICD-10-CM

## 2021-07-15 DIAGNOSIS — M79.674 PAIN DUE TO ONYCHOMYCOSIS OF TOENAILS OF BOTH FEET: Primary | ICD-10-CM

## 2021-07-15 DIAGNOSIS — M79.675 PAIN DUE TO ONYCHOMYCOSIS OF TOENAILS OF BOTH FEET: Primary | ICD-10-CM

## 2021-07-15 PROCEDURE — 11721 DEBRIDE NAIL 6 OR MORE: CPT | Performed by: PODIATRIST

## 2021-07-15 PROCEDURE — 99203 OFFICE O/P NEW LOW 30 MIN: CPT | Mod: 25 | Performed by: PODIATRIST

## 2021-07-15 ASSESSMENT — MIFFLIN-ST. JEOR: SCORE: 1275.14

## 2021-07-15 NOTE — PROGRESS NOTES
PATIENT HISTORY:  Loraine Aviles is a 76 year old female with a significant medical history of seizure disorder who presents to clinic with her caregiver with a chief complaint of painful elongated thickened toenails on both feet.  The patient relates that the nails are aggravated with shoe wear.  The patient relates that the problem has been going on for several years and is getting worse.  The patient relates she cannot trim them safely on her own.  The patient is currently at a group home facility.  The patient was referred by Zach Nguyen PA-C for consultation on the right and left foot.  Any previous notes and studies that pertain to the patient's condition were reviewed.    Pertinent medical, surgical and family history was reviewed in Epic chart and include seizure disorder    Medications:   Current Outpatient Medications:      acetaminophen (TYLENOL) 500 MG tablet, TAKE 2 TABLETS (1000MG) BY MOUTH THREE TIMES DAILY --MAXIMUM OF 4000MG ACETAMINOPHEN IN 24 HOURS--, Disp: 90 tablet, Rfl: 11     alendronate (FOSAMAX) 70 MG tablet, Take 1 tablet (70 mg) by mouth once a week, Disp: 12 tablet, Rfl: 3     CALCIUM 500 + D PO, 1 TABLET ORALLY 3 TIMES DAILY, Disp: , Rfl:      calcium carbonate-vitamin D (OYSTER SHELL CALCIUM/D) 500-200 MG-UNIT tablet, TAKE 1 TABLET BY MOUTH THREE TIMES DAILY., Disp: 90 tablet, Rfl: 11     carBAMazepine (TEGRETOL) 200 MG tablet, Take 1.5 tablets (300 mg) by mouth 2 times daily 200 mg in Am and 300 mg in the PM, Disp: 120 tablet, Rfl: 3     CERTAVITE/ANTIOXIDANTS tablet, TAKE 1 TABLET BY MOUTH ONCE DAILY., Disp: 90 tablet, Rfl: 3     divalproex (DEPAKOTE) 500 MG EC tablet, Take 1 tablet by mouth 3 times daily., Disp: 120 tablet, Rfl: 0     FLUoxetine HCl, PMDD, 10 MG TABS, Take 10 mg by mouth daily, Disp: 90 tablet, Rfl: 3     FLUoxetine HCl, PMDD, 20 MG CAPS, Take 20 mg by mouth daily, Disp: 90 capsule, Rfl: 3     furosemide (LASIX) 40 MG tablet, Take 1 tablet (40 mg) by mouth  "daily, Disp: 30 tablet, Rfl: 11     ibuprofen (ADVIL,MOTRIN) 200 MG tablet, Take 200 mg by mouth every 4 hours as needed for mild pain, Disp: , Rfl:      multivitamins w/minerals tablet, Take 1 tablet by mouth daily, Disp: 90 tablet, Rfl: 0     potassium chloride ER (KLOR-CON M) 20 MEQ CR tablet, Take 1 tablet (20 mEq) by mouth daily, Disp: 30 tablet, Rfl: 11     simvastatin (ZOCOR) 20 MG tablet, TAKE 1 TABLET BY MOUTH AT BEDTIME, Disp: 30 tablet, Rfl: 11     Allergies:    Allergies   Allergen Reactions     Detrol [Tolterodine Tartrate] Other (See Comments)     falls     Ditropan [Oxybutynin Chloride] Other (See Comments)     falls     Medicated Other (See Comments)     Allergic to generic seizure meds.       Vitals: BP (!) 153/76   Ht 1.651 m (5' 5\")   Wt 78.9 kg (174 lb)   LMP 04/02/2004 (Approximate)   BMI 28.96 kg/m    BMI= Body mass index is 28.96 kg/m .    LOWER EXTREMITY PHYSICAL EXAM    Dermatologic: Nails are hypertrophic elongated and incurvated x10.  Otherwise the skin is intact to right and left lower extremity without significant lesions, rash or abrasion.        Vascular: DP & PT pulses are intact & regular on the right and left.   CFT and skin temperature is normal to the right and left lower extremity.     Neurologic: Lower extremity sensation is intact to light touch.  No evidence of weakness in the right and left lower extremity.        Musculoskeletal: Patient is ambulatory without assistive device or brace.  No gross ankle deformity noted.  No foot or ankle joint effusion is noted.           ASSESSMENT / PLAN:     ICD-10-CM    1. Pain due to onychomycosis of toenails of both feet  B35.1     M79.675     M79.674        I have explained to Loraine about the conditions.  We discussed the underlying contributing factors to the condition as well as treatment options along with expected length of recovery.  At this time, the nails were sharply debrided x10 with a nail nipper down to normal thickness " and length.  No bleeding noted.  The patient was given information on local certified foot care nurses that can provide routine nail care.    Loraine verbalized agreement with and understanding of the rational for the diagnosis and treatment plan.  All questions were answered to best of my ability and the patient's satisfaction. The patient was advised to contact the clinic with any questions that may arise after the clinic visit.      Disclaimer: This note consists of symbols derived from keyboarding, dictation and/or voice recognition software. As a result, there may be errors in the script that have gone undetected. Please consider this when interpreting information found in this chart.       MICHAEL Salas D.P.M., [email or web address].

## 2021-07-15 NOTE — LETTER
7/15/2021         RE: Loraine Aviles  [address] [address] [facility] [address]        Dear Colleague,    Thank you for referring your patient, Loraine Aviles, to the [facility] [place]. Please see a copy of my visit note below.    PATIENT HISTORY:  Loraine Aviles is a 76 year old female with a significant medical history of seizure disorder who presents to clinic with her caregiver with a chief complaint of painful elongated thickened toenails on both feet.  The patient relates that the nails are aggravated with shoe wear.  The patient relates that the problem has been going on for several years and is getting worse.  The patient relates she cannot trim them safely on her own.  The patient is currently at a group home facility.  The patient was referred by Zach Nguyen PA-C for consultation on the right and left foot.  Any previous notes and studies that pertain to the patient's condition were reviewed.    Pertinent medical, surgical and family history was reviewed in Epic chart and include seizure disorder    Medications:   Current Outpatient Medications:      acetaminophen (TYLENOL) 500 MG tablet, TAKE 2 TABLETS (1000MG) BY MOUTH THREE TIMES DAILY --MAXIMUM OF 4000MG ACETAMINOPHEN IN 24 HOURS--, Disp: 90 tablet, Rfl: 11     alendronate (FOSAMAX) 70 MG tablet, Take 1 tablet (70 mg) by mouth once a week, Disp: 12 tablet, Rfl: 3     CALCIUM 500 + D PO, 1 TABLET ORALLY 3 TIMES DAILY, Disp: , Rfl:      calcium carbonate-vitamin D (OYSTER SHELL CALCIUM/D) 500-200 MG-UNIT tablet, TAKE 1 TABLET BY MOUTH THREE TIMES DAILY., Disp: 90 tablet, Rfl: 11     carBAMazepine (TEGRETOL) 200 MG tablet, Take 1.5 tablets (300 mg) by mouth 2 times daily 200 mg in Am and 300 mg in the PM, Disp: 120 tablet, Rfl: 3     CERTAVITE/ANTIOXIDANTS tablet, TAKE 1 TABLET BY MOUTH ONCE DAILY., Disp: 90 tablet, Rfl: 3     divalproex (DEPAKOTE) 500 MG EC tablet, Take 1 tablet by mouth 3 times daily.,  "Disp: 120 tablet, Rfl: 0     FLUoxetine HCl, PMDD, 10 MG TABS, Take 10 mg by mouth daily, Disp: 90 tablet, Rfl: 3     FLUoxetine HCl, PMDD, 20 MG CAPS, Take 20 mg by mouth daily, Disp: 90 capsule, Rfl: 3     furosemide (LASIX) 40 MG tablet, Take 1 tablet (40 mg) by mouth daily, Disp: 30 tablet, Rfl: 11     ibuprofen (ADVIL,MOTRIN) 200 MG tablet, Take 200 mg by mouth every 4 hours as needed for mild pain, Disp: , Rfl:      multivitamins w/minerals tablet, Take 1 tablet by mouth daily, Disp: 90 tablet, Rfl: 0     potassium chloride ER (KLOR-CON M) 20 MEQ CR tablet, Take 1 tablet (20 mEq) by mouth daily, Disp: 30 tablet, Rfl: 11     simvastatin (ZOCOR) 20 MG tablet, TAKE 1 TABLET BY MOUTH AT BEDTIME, Disp: 30 tablet, Rfl: 11     Allergies:    Allergies   Allergen Reactions     Detrol [Tolterodine Tartrate] Other (See Comments)     falls     Ditropan [Oxybutynin Chloride] Other (See Comments)     falls     Medicated Other (See Comments)     Allergic to generic seizure meds.       Vitals: BP (!) 153/76   Ht 1.651 m (5' 5\")   Wt 78.9 kg (174 lb)   LMP 04/02/2004 (Approximate)   BMI 28.96 kg/m    BMI= Body mass index is 28.96 kg/m .    LOWER EXTREMITY PHYSICAL EXAM    Dermatologic: Nails are hypertrophic elongated and incurvated x10.  Otherwise the skin is intact to right and left lower extremity without significant lesions, rash or abrasion.        Vascular: DP & PT pulses are intact & regular on the right and left.   CFT and skin temperature is normal to the right and left lower extremity.     Neurologic: Lower extremity sensation is intact to light touch.  No evidence of weakness in the right and left lower extremity.        Musculoskeletal: Patient is ambulatory without assistive device or brace.  No gross ankle deformity noted.  No foot or ankle joint effusion is noted.           ASSESSMENT / PLAN:     ICD-10-CM    1. Pain due to onychomycosis of toenails of both feet  B35.1     M79.675     M79.674        I have " explained to Loraine about the conditions.  We discussed the underlying contributing factors to the condition as well as treatment options along with expected length of recovery.  At this time, the nails were sharply debrided x10 with a nail nipper down to normal thickness and length.  No bleeding noted.  The patient was given information on local certified foot care nurses that can provide routine nail care.    Loraine verbalized agreement with and understanding of the rational for the diagnosis and treatment plan.  All questions were answered to best of my ability and the patient's satisfaction. The patient was advised to contact the clinic with any questions that may arise after the clinic visit.      Disclaimer: This note consists of symbols derived from keyboarding, dictation and/or voice recognition software. As a result, there may be errors in the script that have gone undetected. Please consider this when interpreting information found in this chart.       HIPOLITO Graf.PMARYA., F.A.C.F.A.S.        Again, thank you for allowing me to participate in the care of your patient.        Sincerely,        Christ Salas DPM

## 2021-07-15 NOTE — PATIENT INSTRUCTIONS
[facility]                    Velia Ram RN                                [phone]                                                Serving [place] and [facility]  In Home Foot Care    [facility] Yael Hurtado [phone] [email or web address]  [place]/ [place]/ [place] on [address]     [facility] Felisa Wilkes         RN, CFCN, [facility] [phone] [email or web address] [place]:  [place], [facility], [place], [place]     [facility] Bethanie Carbajal         RN, CWOCN, [facility] [phone] [email or web address]  Clinic: [facility]     Home visits: [facility]     [facility] Aixa DILLARD, RN, [facility] [phone] [email or web address] Clinic:  [facility]  Home visits:  [facility]      [facility] Rosa Fish RN, [facility] [phone] [email or web address] [facility]     [facility] Kaylee Meredith RN ,BSN,[facility] [phone]    [email or web address] [place], [facility], [place], [place], [place], [place], [place]     Arlette Amezcua   [phone]  [email or web address]  [facility]  Home visits: [place] / [facility], [place]

## 2021-09-01 ENCOUNTER — MEDICAL CORRESPONDENCE (OUTPATIENT)
Dept: HEALTH INFORMATION MANAGEMENT | Facility: CLINIC | Age: 76
End: 2021-09-01

## 2021-09-01 ENCOUNTER — OFFICE VISIT (OUTPATIENT)
Dept: FAMILY MEDICINE | Facility: CLINIC | Age: 76
End: 2021-09-01
Payer: MEDICARE

## 2021-09-01 VITALS
SYSTOLIC BLOOD PRESSURE: 122 MMHG | WEIGHT: 168.8 LBS | DIASTOLIC BLOOD PRESSURE: 80 MMHG | BODY MASS INDEX: 28.12 KG/M2 | TEMPERATURE: 98.8 F | OXYGEN SATURATION: 95 % | HEART RATE: 73 BPM | HEIGHT: 65 IN

## 2021-09-01 DIAGNOSIS — M81.0 OSTEOPOROSIS, UNSPECIFIED OSTEOPOROSIS TYPE, UNSPECIFIED PATHOLOGICAL FRACTURE PRESENCE: ICD-10-CM

## 2021-09-01 DIAGNOSIS — Z00.00 MEDICARE ANNUAL WELLNESS VISIT, SUBSEQUENT: Primary | ICD-10-CM

## 2021-09-01 PROCEDURE — G0439 PPPS, SUBSEQ VISIT: HCPCS | Performed by: PHYSICIAN ASSISTANT

## 2021-09-01 ASSESSMENT — ENCOUNTER SYMPTOMS
HEMATURIA: 0
HEARTBURN: 0
SORE THROAT: 0
JOINT SWELLING: 0
CHILLS: 0
DIARRHEA: 0
HEMATOCHEZIA: 0
NAUSEA: 0
NERVOUS/ANXIOUS: 0
WEAKNESS: 0
SHORTNESS OF BREATH: 0
FEVER: 0
PARESTHESIAS: 0
HEADACHES: 0
ARTHRALGIAS: 0
EYE PAIN: 0
FREQUENCY: 0
DYSURIA: 0
CONSTIPATION: 0
COUGH: 0
DIZZINESS: 0
PALPITATIONS: 0
ABDOMINAL PAIN: 0
BREAST MASS: 0
MYALGIAS: 0

## 2021-09-01 ASSESSMENT — ACTIVITIES OF DAILY LIVING (ADL): CURRENT_FUNCTION: NO ASSISTANCE NEEDED

## 2021-09-01 ASSESSMENT — MIFFLIN-ST. JEOR: SCORE: 1251.55

## 2021-09-01 NOTE — PATIENT INSTRUCTIONS
Preventive Health Recommendations    - Flu shots come in next week. Please schedule a CMA appointment to get the vaccine.   - If you have not had the shingles vaccine, I recommend that you consider getting that vaccine.   - Continue all of your current medications.   - Follow-up with me in 1 year for a physical, or sooner if symptoms arise.     See your health care provider every year to    Review health changes.     Discuss preventive care.      Review your medicines if your doctor has prescribed any.      You no longer need a yearly Pap test unless you've had an abnormal Pap test in the past 10 years. If you have vaginal symptoms, such as bleeding or discharge, be sure to talk with your provider about a Pap test.      Every 1 to 2 years, have a mammogram.  If you are over 69, talk with your health care provider about whether or not you want to continue having screening mammograms.      Every 10 years, have a colonoscopy. Or, have a yearly FIT test (stool test). These exams will check for colon cancer.       Have a cholesterol test every 5 years, or more often if your doctor advises it.       Have a diabetes test (fasting glucose) every three years. If you are at risk for diabetes, you should have this test more often.       At age 65, have a bone density scan (DEXA) to check for osteoporosis (brittle bone disease).    Shots:    Get a flu shot each year.    Get a tetanus shot every 10 years.    Talk to your doctor about your pneumonia vaccines. There are now two you should receive - Pneumovax (PPSV 23) and Prevnar (PCV 13).    Talk to your pharmacist about the shingles vaccine.    Talk to your doctor about the hepatitis B vaccine.    Nutrition:     Eat at least 5 servings of fruits and vegetables each day.      Eat whole-grain bread, whole-wheat pasta and brown rice instead of white grains and rice.      Get adequate about Calcium and Vitamin D.     Lifestyle    Exercise at least 150 minutes a week (30 minutes a  day, 5 days a week). This will help you control your weight and prevent disease.      Limit alcohol to one drink per day.      No smoking.       Wear sunscreen to prevent skin cancer.       See your dentist twice a year for an exam and cleaning.      See your eye doctor every 1 to 2 years to screen for conditions such as glaucoma, macular degeneration, cataracts, etc.    Personalized Prevention Plan  You are due for the preventive services outlined below.  Your care team is available to assist you in scheduling these services.  If you have already completed any of these items, please share that information with your care team to update in your medical record.    Health Maintenance Due   Topic Date Due     ANNUAL REVIEW OF HM ORDERS  Never done     Hepatitis C Screening  Never done     Zoster (Shingles) Vaccine (1 of 2) Never done     FALL RISK ASSESSMENT  08/31/2021     Flu Vaccine (1) 09/01/2021

## 2021-09-01 NOTE — PROGRESS NOTES
"SUBJECTIVE:   Loraine Aviles is a 76 year old female who presents for Preventive Visit with Denice, her direct supervisor personnel      Patient has been advised of split billing requirements and indicates understanding: Yes   Are you in the first 12 months of your Medicare coverage?  No    Healthy Habits:     In general, how would you rate your overall health?  Good    Frequency of exercise:  None    Do you usually eat at least 4 servings of fruit and vegetables a day, include whole grains    & fiber and avoid regularly eating high fat or \"junk\" foods?  Yes    Taking medications regularly:  Yes    Medication side effects:  None    Ability to successfully perform activities of daily living:  No assistance needed    Home Safety:  No safety concerns identified    Hearing Impairment:  No hearing concerns    In the past 6 months, have you been bothered by leaking of urine?  No    In general, how would you rate your overall mental or emotional health?  Good      PHQ-2 Total Score: 0    Additional concerns today:  No    Do you feel safe in your environment? Yes    Have you ever done Advance Care Planning? (For example, a Health Directive, POLST, or a discussion with a medical provider or your loved ones about your wishes): No, advance care planning information given to patient to review.  Patient declined advance care planning discussion at this time.     Fall risk  Fallen 2 or more times in the past year?: No  Any fall with injury in the past year?: No    Cognitive Screening   1) Repeat 3 items (Leader, Season, Table)    2) Clock draw: ABNORMAL didn't draw clock correctly or write the time  3) 3 item recall: Recalls 2 objects   Results: ABNORMAL clock, 1-2 items recalled: PROBABLE COGNITIVE IMPAIRMENT, **INFORM PROVIDER**    Mini-CogTM Copyright ROSEMARY Leggett. Licensed by the author for use in [facility]; reprinted with permission (babs@.[facility]). All rights reserved.      Do you have sleep apnea, excessive " snoring or daytime drowsiness?: no    Reviewed and updated as needed this visit by clinical staff  Tobacco  Allergies  Meds  Problems  Med Hx  Surg Hx  Fam Hx  Soc Hx        Reviewed and updated as needed this visit by Provider  Tobacco  Allergies  Meds  Problems  Med Hx  Surg Hx  Fam Hx         Social History     Tobacco Use     Smoking status: Never Smoker     Smokeless tobacco: Never Used   Substance Use Topics     Alcohol use: No     If you drink alcohol do you typically have >3 drinks per day or >7 drinks per week? No    Alcohol Use 9/1/2021   Prescreen: >3 drinks/day or >7 drinks/week? Not Applicable   Prescreen: >3 drinks/day or >7 drinks/week? -     Current providers sharing in care for this patient include:   Patient Care Team:  Carlos Servin MD as PCP - General (Family Practice)  Zach Nguyen PA-C as Assigned PCP  Christ Salas DPM as Assigned Musculoskeletal Provider    The following health maintenance items are reviewed in Epic and correct as of today:  Health Maintenance Due   Topic Date Due     ANNUAL REVIEW OF  ORDERS  Never done     HEPATITIS C SCREENING  Never done     ZOSTER IMMUNIZATION (1 of 2) Never done     INFLUENZA VACCINE (1) 09/01/2021     Labs reviewed in EPIC    Mammogram Screening - Patient over age 75, has elected to discontinue screenings.  Pertinent mammograms are reviewed under the imaging tab.    Review of Systems   Constitutional: Negative for chills and fever.   HENT: Negative for congestion, ear pain, hearing loss and sore throat.    Eyes: Negative for pain and visual disturbance.   Respiratory: Negative for cough and shortness of breath.    Cardiovascular: Negative for chest pain, palpitations and peripheral edema.   Gastrointestinal: Negative for abdominal pain, constipation, diarrhea, heartburn, hematochezia and nausea.   Breasts:  Negative for tenderness, breast mass and discharge.   Genitourinary: Negative for dysuria, frequency, genital  "sores, hematuria, pelvic pain, urgency, vaginal bleeding and vaginal discharge.   Musculoskeletal: Negative for arthralgias, joint swelling and myalgias.   Skin: Negative for rash.   Neurological: Negative for dizziness, weakness, headaches and paresthesias.   Psychiatric/Behavioral: Negative for mood changes. The patient is not nervous/anxious.      OBJECTIVE:   /80 (BP Location: Right arm, Patient Position: Sitting, Cuff Size: Adult Large)   Pulse 73   Temp 98.8  F (37.1  C) (Tympanic)   Ht 1.651 m (5' 5\")   Wt 76.6 kg (168 lb 12.8 oz)   LMP 04/02/2004 (Approximate)   SpO2 95%   Breastfeeding No   BMI 28.09 kg/m   Estimated body mass index is 28.09 kg/m  as calculated from the following:    Height as of this encounter: 1.651 m (5' 5\").    Weight as of this encounter: 76.6 kg (168 lb 12.8 oz).  Physical Exam  Constitutional: healthy, alert, and no distress  Head: Normocephalic. Atraumatic  Eyes: No conjunctival injection, sclera anicteric  ENT: MMM. Throat is without erythema, tonsillar enlargement or exudates. No uvular deviation. Airway patent.   Cardiovascular: RRR. No murmurs, clicks, gallops, or rubs. No peripheral edema.   Respiratory: No resp distress. Lungs CTAB bilaterally.  Abdomen: soft, non-tender throughout, no rebound or guarding. NABS x4.    Musculoskeletal: extremities normal- no gross deformities noted, and normal muscle tone  Skin: no suspicious lesions or rashes  Neurologic: Gait normal. CN 2-12 grossly intact. Sensation, strength and coordination are grossly intact.   Psychiatric: mentation appears normal and affect normal/bright     Diagnostic Test Results:  Labs reviewed in Epic    ASSESSMENT / PLAN:   Medicare annual wellness visit, subsequent  76 yr old here for Medicare Wellness exam. Last physical exam done 1 year ago. Discussed preventative screenings which are updated below. Counseled on immunizations and healthy lifestyle. Follow-up in 1 year for repeat exam. " "    Osteoporosis, unspecified osteoporosis type, unspecified pathological fracture presence  Has been on Fosamax for 4 years now. She has not had repeat testing. Will continue Fosamax for 1 more year and repeat Dexa scan at that time. Will determine if we will continue Fosamax at that time.     Patient has been advised of split billing requirements and indicates understanding: Yes  COUNSELING:  Reviewed preventive health counseling, as reflected in patient instructions       Regular exercise       Healthy diet/nutrition       Vision screening       Dental care       Osteoporosis prevention/bone health    Estimated body mass index is 28.09 kg/m  as calculated from the following:    Height as of this encounter: 1.651 m (5' 5\").    Weight as of this encounter: 76.6 kg (168 lb 12.8 oz).    Weight management plan: Discussed healthy diet and exercise guidelines    She reports that she has never smoked. She has never used smokeless tobacco.    Appropriate preventive services were discussed with this patient, including applicable screening as appropriate for cardiovascular disease, diabetes, osteopenia/osteoporosis, and glaucoma.  As appropriate for age/gender, discussed screening for colorectal cancer, prostate cancer, breast cancer, and cervical cancer. Checklist reviewing preventive services available has been given to the patient.    Reviewed patients plan of care and provided an AVS. The Intermediate Care Plan ( asthma action plan, low back pain action plan, and migraine action plan) for Loraine meets the Care Plan requirement. This Care Plan has been established and reviewed with the Patient and caregiver.    Zach Nguyen PA-C  [facility]    Identified Health Risks:  "

## 2021-10-19 DIAGNOSIS — M81.0 OSTEOPOROSIS, UNSPECIFIED OSTEOPOROSIS TYPE, UNSPECIFIED PATHOLOGICAL FRACTURE PRESENCE: ICD-10-CM

## 2021-10-21 RX ORDER — ALENDRONATE SODIUM 70 MG/1
TABLET ORAL
Qty: 12 TABLET | Refills: 11 | OUTPATIENT
Start: 2021-10-21

## 2021-10-21 NOTE — TELEPHONE ENCOUNTER
Pt can stop Fosamax. She has been on this medication for long enough. We should repeat a Dexa scan for her bones. This was ordered today.     Zach Nguyen PA-C

## 2021-10-21 NOTE — TELEPHONE ENCOUNTER
Routing refill request to provider for review/approval because:  Current DEXA not on file.  JOSÉ LUIS Marshall RN

## 2021-10-26 NOTE — TELEPHONE ENCOUNTER
Did discuss this with  Paola - Provide Care   Was aware of this message.  Susanna [facility]

## 2021-10-28 ENCOUNTER — TELEPHONE (OUTPATIENT)
Dept: FAMILY MEDICINE | Facility: CLINIC | Age: 76
End: 2021-10-28

## 2021-10-28 DIAGNOSIS — M81.0 OSTEOPOROSIS, UNSPECIFIED OSTEOPOROSIS TYPE, UNSPECIFIED PATHOLOGICAL FRACTURE PRESENCE: ICD-10-CM

## 2021-10-28 RX ORDER — ALENDRONATE SODIUM 70 MG/1
70 TABLET ORAL WEEKLY
Qty: 12 TABLET | Refills: 3 | Status: SHIPPED | OUTPATIENT
Start: 2021-10-28 | End: 2022-10-05

## 2021-10-28 NOTE — TELEPHONE ENCOUNTER
From 9/1/21 visit:  Osteoporosis, unspecified osteoporosis type, unspecified pathological fracture presence  Has been on Fosamax for 4 years now. She has not had repeat testing. Will continue Fosamax for 1 more year and repeat Dexa scan at that time. Will determine if we will continue Fosamax at that time.     Refill sent to pharmacy

## 2021-10-28 NOTE — TELEPHONE ENCOUNTER
Reason for Call:  Discontinue order    Detailed comments: 9/1/21 Office Visit 9/1/21  Julieth is requesting copy of discontinue order for Pt's Alendronate that he discontinued 9/1/21.  Please Fax to [phone] PCI Nurse     Phone Number Patient can be reached at: Other phone number:  [phone] *    Best Time: Any Time      Can we leave a detailed message on this number? YES    Call taken on 10/28/2021 at 1:45 PM by Susanna Benz

## 2022-01-25 DIAGNOSIS — E78.00 PURE HYPERCHOLESTEROLEMIA: ICD-10-CM

## 2022-01-25 RX ORDER — MULTIPLE VITAMINS W/ MINERALS TAB 9MG-400MCG
1 TAB ORAL DAILY
Qty: 90 TABLET | Refills: 3 | Status: SHIPPED | OUTPATIENT
Start: 2022-01-25

## 2022-05-04 ENCOUNTER — TELEPHONE (OUTPATIENT)
Dept: FAMILY MEDICINE | Facility: CLINIC | Age: 77
End: 2022-05-04
Payer: MEDICARE

## 2022-05-04 DIAGNOSIS — N39.498 OTHER URINARY INCONTINENCE: ICD-10-CM

## 2022-05-04 DIAGNOSIS — R39.15 URGENCY OF URINATION: ICD-10-CM

## 2022-05-04 DIAGNOSIS — R62.50 DELAY IN DEVELOPMENT: Primary | ICD-10-CM

## 2022-05-04 NOTE — TELEPHONE ENCOUNTER
Fax from [place] [facility] [place]: NEED ASAP     We have been contacted by this patient requesting Incontinence Products for home use.   If you feel his patient is able to get these products. Please fax a Rx for Incontinence products. Please include refill amounts along with all associated diagnosis codes pertaining to product and patient for insurance billing compliance.  Please include refills good for 1 year    Fax completed Rx to [phone]    If you feel the patient does not qualify, please notify us ASAP.  Phone [phone]

## 2022-05-04 NOTE — TELEPHONE ENCOUNTER
Order signed and faxed to [facility] for incontinence supplies.  Rosaura HAYES RN

## 2022-06-09 ENCOUNTER — MEDICAL CORRESPONDENCE (OUTPATIENT)
Dept: HEALTH INFORMATION MANAGEMENT | Facility: CLINIC | Age: 77
End: 2022-06-09

## 2022-09-26 ENCOUNTER — TRANSFERRED RECORDS (OUTPATIENT)
Dept: HEALTH INFORMATION MANAGEMENT | Facility: CLINIC | Age: 77
End: 2022-09-26

## 2022-10-05 ENCOUNTER — MEDICAL CORRESPONDENCE (OUTPATIENT)
Dept: FAMILY MEDICINE | Facility: CLINIC | Age: 77
End: 2022-10-05

## 2022-10-05 ENCOUNTER — OFFICE VISIT (OUTPATIENT)
Dept: FAMILY MEDICINE | Facility: CLINIC | Age: 77
End: 2022-10-05
Payer: MEDICARE

## 2022-10-05 VITALS
HEART RATE: 69 BPM | DIASTOLIC BLOOD PRESSURE: 84 MMHG | RESPIRATION RATE: 20 BRPM | BODY MASS INDEX: 26.52 KG/M2 | WEIGHT: 165 LBS | HEIGHT: 66 IN | OXYGEN SATURATION: 96 % | SYSTOLIC BLOOD PRESSURE: 130 MMHG | TEMPERATURE: 97.2 F

## 2022-10-05 DIAGNOSIS — M81.0 OSTEOPOROSIS, UNSPECIFIED OSTEOPOROSIS TYPE, UNSPECIFIED PATHOLOGICAL FRACTURE PRESENCE: ICD-10-CM

## 2022-10-05 DIAGNOSIS — E87.6 HYPOKALEMIA: ICD-10-CM

## 2022-10-05 DIAGNOSIS — G40.909 SEIZURE DISORDER (H): ICD-10-CM

## 2022-10-05 DIAGNOSIS — Z23 HIGH PRIORITY FOR 2019-NCOV VACCINE: ICD-10-CM

## 2022-10-05 DIAGNOSIS — Z23 NEED FOR PROPHYLACTIC VACCINATION AND INOCULATION AGAINST INFLUENZA: ICD-10-CM

## 2022-10-05 DIAGNOSIS — F33.0 MILD EPISODE OF RECURRENT MAJOR DEPRESSIVE DISORDER (H): ICD-10-CM

## 2022-10-05 DIAGNOSIS — E78.00 PURE HYPERCHOLESTEROLEMIA: ICD-10-CM

## 2022-10-05 DIAGNOSIS — M25.559 HIP PAIN: ICD-10-CM

## 2022-10-05 DIAGNOSIS — Z00.00 ENCOUNTER FOR MEDICARE ANNUAL WELLNESS EXAM: Primary | ICD-10-CM

## 2022-10-05 DIAGNOSIS — R60.9 EDEMA, UNSPECIFIED TYPE: ICD-10-CM

## 2022-10-05 LAB
ALBUMIN SERPL BCG-MCNC: 4.1 G/DL (ref 3.5–5.2)
ALP SERPL-CCNC: 43 U/L (ref 35–104)
ALT SERPL W P-5'-P-CCNC: 20 U/L (ref 10–35)
ANION GAP SERPL CALCULATED.3IONS-SCNC: 8 MMOL/L (ref 7–15)
AST SERPL W P-5'-P-CCNC: 16 U/L (ref 10–35)
BILIRUB SERPL-MCNC: <0.2 MG/DL
BUN SERPL-MCNC: 18.8 MG/DL (ref 8–23)
CALCIUM SERPL-MCNC: 9.2 MG/DL (ref 8.8–10.2)
CHLORIDE SERPL-SCNC: 103 MMOL/L (ref 98–107)
CREAT SERPL-MCNC: 0.73 MG/DL (ref 0.51–0.95)
DEPRECATED HCO3 PLAS-SCNC: 29 MMOL/L (ref 22–29)
ERYTHROCYTE [DISTWIDTH] IN BLOOD BY AUTOMATED COUNT: 11.5 % (ref 10–15)
GFR SERPL CREATININE-BSD FRML MDRD: 84 ML/MIN/1.73M2
GLUCOSE SERPL-MCNC: 107 MG/DL (ref 70–99)
HCT VFR BLD AUTO: 43.4 % (ref 35–47)
HGB BLD-MCNC: 15 G/DL (ref 11.7–15.7)
MCH RBC QN AUTO: 33 PG (ref 26.5–33)
MCHC RBC AUTO-ENTMCNC: 34.6 G/DL (ref 31.5–36.5)
MCV RBC AUTO: 96 FL (ref 78–100)
PLATELET # BLD AUTO: 178 10E3/UL (ref 150–450)
POTASSIUM SERPL-SCNC: 4.3 MMOL/L (ref 3.4–5.3)
PROT SERPL-MCNC: 6.7 G/DL (ref 6.4–8.3)
RBC # BLD AUTO: 4.54 10E6/UL (ref 3.8–5.2)
SODIUM SERPL-SCNC: 140 MMOL/L (ref 136–145)
VALPROATE SERPL-MCNC: 21.9 UG/ML
WBC # BLD AUTO: 5.3 10E3/UL (ref 4–11)

## 2022-10-05 PROCEDURE — 91313 COVID-19,PF,MODERNA BIVALENT: CPT | Performed by: PHYSICIAN ASSISTANT

## 2022-10-05 PROCEDURE — 0134A COVID-19,PF,MODERNA BIVALENT: CPT | Performed by: PHYSICIAN ASSISTANT

## 2022-10-05 PROCEDURE — 85027 COMPLETE CBC AUTOMATED: CPT | Performed by: PHYSICIAN ASSISTANT

## 2022-10-05 PROCEDURE — G0439 PPPS, SUBSEQ VISIT: HCPCS | Performed by: PHYSICIAN ASSISTANT

## 2022-10-05 PROCEDURE — 90662 IIV NO PRSV INCREASED AG IM: CPT | Performed by: PHYSICIAN ASSISTANT

## 2022-10-05 PROCEDURE — 99214 OFFICE O/P EST MOD 30 MIN: CPT | Mod: 25 | Performed by: PHYSICIAN ASSISTANT

## 2022-10-05 PROCEDURE — 80053 COMPREHEN METABOLIC PANEL: CPT | Performed by: PHYSICIAN ASSISTANT

## 2022-10-05 PROCEDURE — 80164 ASSAY DIPROPYLACETIC ACD TOT: CPT | Performed by: PHYSICIAN ASSISTANT

## 2022-10-05 PROCEDURE — 86803 HEPATITIS C AB TEST: CPT | Performed by: PHYSICIAN ASSISTANT

## 2022-10-05 PROCEDURE — G0008 ADMIN INFLUENZA VIRUS VAC: HCPCS | Mod: 59 | Performed by: PHYSICIAN ASSISTANT

## 2022-10-05 PROCEDURE — 36415 COLL VENOUS BLD VENIPUNCTURE: CPT | Performed by: PHYSICIAN ASSISTANT

## 2022-10-05 RX ORDER — POTASSIUM CHLORIDE 1500 MG/1
20 TABLET, EXTENDED RELEASE ORAL DAILY
Qty: 30 TABLET | Refills: 11 | Status: SHIPPED | OUTPATIENT
Start: 2022-10-05

## 2022-10-05 RX ORDER — ACETAMINOPHEN 500 MG
TABLET ORAL
Qty: 90 TABLET | Refills: 11 | Status: SHIPPED | OUTPATIENT
Start: 2022-10-05 | End: 2024-10-01

## 2022-10-05 RX ORDER — DIVALPROEX SODIUM 500 MG/1
500 TABLET, DELAYED RELEASE ORAL 3 TIMES DAILY
Qty: 90 TABLET | Refills: 11 | Status: SHIPPED | OUTPATIENT
Start: 2022-10-05 | End: 2023-10-09

## 2022-10-05 RX ORDER — ALENDRONATE SODIUM 70 MG/1
70 TABLET ORAL WEEKLY
Qty: 12 TABLET | Refills: 3 | Status: SHIPPED | OUTPATIENT
Start: 2022-10-05 | End: 2022-12-14

## 2022-10-05 RX ORDER — CARBAMAZEPINE 200 MG/1
TABLET ORAL
Qty: 75 TABLET | Refills: 11 | Status: SHIPPED | OUTPATIENT
Start: 2022-10-05 | End: 2023-10-09

## 2022-10-05 RX ORDER — FLUOXETINE 10 MG/1
10 TABLET ORAL DAILY
Qty: 30 TABLET | Refills: 11 | Status: SHIPPED | OUTPATIENT
Start: 2022-10-05 | End: 2023-10-09

## 2022-10-05 RX ORDER — FUROSEMIDE 40 MG
40 TABLET ORAL DAILY
Qty: 30 TABLET | Refills: 11 | Status: SHIPPED | OUTPATIENT
Start: 2022-10-05 | End: 2023-10-09

## 2022-10-05 RX ORDER — SIMVASTATIN 20 MG
TABLET ORAL
Qty: 30 TABLET | Refills: 11 | Status: SHIPPED | OUTPATIENT
Start: 2022-10-05 | End: 2023-10-09

## 2022-10-05 ASSESSMENT — ENCOUNTER SYMPTOMS
COUGH: 0
DIARRHEA: 0
HEMATOCHEZIA: 0
SHORTNESS OF BREATH: 0
PALPITATIONS: 0
DYSURIA: 0
EYE PAIN: 0
ARTHRALGIAS: 0
HEADACHES: 0
FREQUENCY: 0
DIZZINESS: 0
ABDOMINAL PAIN: 0
CONSTIPATION: 0
HEARTBURN: 0
NAUSEA: 0
WEAKNESS: 0
MYALGIAS: 0
HEMATURIA: 0
CHILLS: 0
JOINT SWELLING: 1
BREAST MASS: 0
PARESTHESIAS: 0
NERVOUS/ANXIOUS: 0
FEVER: 0
SORE THROAT: 0

## 2022-10-05 ASSESSMENT — PAIN SCALES - GENERAL: PAINLEVEL: NO PAIN (0)

## 2022-10-05 ASSESSMENT — ACTIVITIES OF DAILY LIVING (ADL): CURRENT_FUNCTION: NO ASSISTANCE NEEDED

## 2022-10-05 ASSESSMENT — PATIENT HEALTH QUESTIONNAIRE - PHQ9
10. IF YOU CHECKED OFF ANY PROBLEMS, HOW DIFFICULT HAVE THESE PROBLEMS MADE IT FOR YOU TO DO YOUR WORK, TAKE CARE OF THINGS AT HOME, OR GET ALONG WITH OTHER PEOPLE: NOT DIFFICULT AT ALL
SUM OF ALL RESPONSES TO PHQ QUESTIONS 1-9: 0
SUM OF ALL RESPONSES TO PHQ QUESTIONS 1-9: 0

## 2022-10-05 NOTE — PROGRESS NOTES
"SUBJECTIVE:   Loraine is a 77 year old who presents for Preventive Visit.    Patient has been advised of split billing requirements and indicates understanding: Yes     Are you in the first 12 months of your Medicare coverage?  No    Healthy Habits:     In general, how would you rate your overall health?  Good    Frequency of exercise:  6-7 days/week    Duration of exercise:  15-30 minutes    Do you usually eat at least 4 servings of fruit and vegetables a day, include whole grains    & fiber and avoid regularly eating high fat or \"junk\" foods?  Yes    Taking medications regularly:  Yes    Medication side effects:  None    Ability to successfully perform activities of daily living:  No assistance needed    Home Safety:  No safety concerns identified    Hearing Impairment:  No hearing concerns    In the past 6 months, have you been bothered by leaking of urine?  No    In general, how would you rate your overall mental or emotional health?  Good      PHQ-2 Total Score: 0    Additional concerns today:  No    Do you feel safe in your environment? Yes    Have you ever done Advance Care Planning? (For example, a Health Directive, POLST, or a discussion with a medical provider or your loved ones about your wishes): No, advance care planning information given to patient to review.  Patient plans to discuss their wishes with loved ones or provider.       Fall risk  Fallen 2 or more times in the past year?: No  Any fall with injury in the past year?: No    Cognitive Screening   1) Repeat 3 items (Leader, Season, Table)    2) Clock draw: Jacob clock unable to add the time   3) 3 item recall: Recalls 2 objects   Results: ABNORMAL clock, 1-2 items recalled: PROBABLE COGNITIVE IMPAIRMENT, **INFORM PROVIDER**    Mini-CogTM Copyright ROSEMARY Leggett. Licensed by the author for use in [facility]; reprinted with permission ([email or web address]). All rights reserved.        Reviewed and updated as needed this visit by clinical staff   " Tobacco  Allergies  Meds  Problems  Med Hx  Surg Hx  Fam Hx  Soc   Hx        Reviewed and updated as needed this visit by Provider   Tobacco  Allergies  Meds  Problems  Med Hx  Surg Hx  Fam Hx         Social History     Tobacco Use     Smoking status: Never     Smokeless tobacco: Never   Substance Use Topics     Alcohol use: No     If you drink alcohol do you typically have >3 drinks per day or >7 drinks per week? No    Alcohol Use 10/5/2022   Prescreen: >3 drinks/day or >7 drinks/week? Not Applicable   Prescreen: >3 drinks/day or >7 drinks/week? -   No flowsheet data found.    Concerns:  1. Would like to discuss if she still needs to be on acetaminophen 3 times a day   2. Referral for bone density scan     Current providers sharing in care for this patient include:   Patient Care Team:  Zach Nguyen PA-C as PCP - General (Family Medicine)  Zach Nguyen PA-C as Assigned PCP  Christ Salas DPM as Assigned Musculoskeletal Provider    The following health maintenance items are reviewed in Epic and correct as of today:  Health Maintenance   Topic Date Due     HEPATITIS B IMMUNIZATION (1 of 3 - 3-dose series) Never done     ZOSTER IMMUNIZATION (1 of 2) Never done     DTAP/TDAP/TD IMMUNIZATION (4 - Td or Tdap) 03/26/2023     MEDICARE ANNUAL WELLNESS VISIT  10/05/2023     ANNUAL REVIEW OF HM ORDERS  10/05/2023     FALL RISK ASSESSMENT  10/05/2023     DEXA  05/10/2026     LIPID  06/24/2026     ADVANCE CARE PLANNING  10/06/2027     HEPATITIS C SCREENING  Completed     INFLUENZA VACCINE  Completed     Pneumococcal Vaccine: 65+ Years  Completed     COVID-19 Vaccine  Completed     IPV IMMUNIZATION  Aged Out     MENINGITIS IMMUNIZATION  Aged Out     MAMMO SCREENING  Discontinued     COLORECTAL CANCER SCREENING  Discontinued             Pertinent mammograms are reviewed under the imaging tab.    Review of Systems   Constitutional: Negative for chills and fever.   HENT: Negative for congestion,  "ear pain, hearing loss and sore throat.    Eyes: Negative for pain and visual disturbance.   Respiratory: Negative for cough and shortness of breath.    Cardiovascular: Negative for chest pain, palpitations and peripheral edema.   Gastrointestinal: Negative for abdominal pain, constipation, diarrhea, heartburn, hematochezia and nausea.   Breasts:  Negative for tenderness, breast mass and discharge.   Genitourinary: Negative for dysuria, frequency, genital sores, hematuria, pelvic pain, urgency, vaginal bleeding and vaginal discharge.   Musculoskeletal: Positive for joint swelling. Negative for arthralgias and myalgias.   Skin: Negative for rash.   Neurological: Negative for dizziness, weakness, headaches and paresthesias.   Psychiatric/Behavioral: Negative for mood changes. The patient is not nervous/anxious.        OBJECTIVE:   /84 (BP Location: Right arm, Patient Position: Sitting, Cuff Size: Adult Large)   Pulse 69   Temp 97.2  F (36.2  C) (Tympanic)   Resp 20   Ht 1.664 m (5' 5.5\")   Wt 74.8 kg (165 lb)   LMP 04/02/2004 (Approximate)   SpO2 96%   BMI 27.04 kg/m   Estimated body mass index is 27.04 kg/m  as calculated from the following:    Height as of this encounter: 1.664 m (5' 5.5\").    Weight as of this encounter: 74.8 kg (165 lb).  Physical Exam  Constitutional: healthy, alert, and no distress  Head: Normocephalic. Atraumatic  Eyes: No conjunctival injection, sclera anicteric  Neck: supple, no thyromegaly, nodules or asymmetry of the thyroid. No cervical LAD.  Cardiovascular: RRR. No murmurs, clicks, gallops, or rubs. No peripheral edema.   Respiratory: No resp distress. Lungs CTAB bilaterally.   Musculoskeletal: extremities normal- no gross deformities noted, and normal muscle tone  Skin: no suspicious lesions or rashes  Neurologic: Gait normal. CN 2-12 grossly intact  Psychiatric: mentation appears normal and affect normal/bright     Diagnostic Test Results:  Labs reviewed in " Epic    ASSESSMENT / PLAN:   Encounter for Medicare annual wellness exam  77 yr old here for physical exam. Last physical exam done 1 year ago. Discussed preventative screenings which are updated below. Counseled on immunizations and healthy lifestyle.  Follow-up in 1 year for repeat physical exam.     Osteoporosis, unspecified osteoporosis type, unspecified pathological fracture presence  Has been on Fosamax for 10 years without a repeat Dexa scan. Ordered today. Will adjust medication based on results of Dexa scan.   - DX Hip/Pelvis/Spine; Future  - alendronate (FOSAMAX) 70 MG tablet; Take 1 tablet (70 mg) by mouth once a week    Hip pain  Stable and chronic. Continue Tylenol, but switch to prn.   - acetaminophen (TYLENOL) 500 MG tablet; TAKE 2 TABLETS (1000MG) BY MOUTH THREE TIMES DAILY as NEEDED --MAXIMUM OF 4000MG ACETAMINOPHEN IN 24 HOURS--  - calcium carbonate-vitamin D (OYSTER SHELL CALCIUM/D) 500-200 MG-UNIT tablet; TAKE 1 TABLET BY MOUTH THREE TIMES DAILY.    Edema, unspecified type  Stable. Continue lasix. Check CMP  - furosemide (LASIX) 40 MG tablet; Take 1 tablet (40 mg) by mouth daily    Pure hypercholesterolemia  Stable. Continue simvastatin.   - simvastatin (ZOCOR) 20 MG tablet; TAKE 1 TABLET BY MOUTH AT BEDTIME    Mild episode of recurrent major depressive disorder (H)  Stable and well controlled based on PHQ scoring today. Pt will monitor symptoms and continue Prozac unchanged.    - FLUoxetine HCl, PMDD, 10 MG TABS; Take 10 mg by mouth daily  - FLUoxetine HCl, PMDD, 20 MG CAPS; Take 20 mg by mouth daily    Seizure disorder (H)  Stable. No recent seizure activity. Check CBC, VPA level. Tegretol and Depakote were refilled.   - CBC with platelets; Future  - Valproic acid; Future  - carBAMazepine (TEGRETOL) 200 MG tablet; Take 1 tablet (200 mg) by mouth every morning AND 1.5 tablets (300 mg) every evening. 200 mg in Am and 300 mg in the PM  - divalproex sodium delayed-release (DEPAKOTE) 500 MG   "tablet; Take 1 tablet (500 mg) by mouth 3 times daily    Hypokalemia  Refilled. Check CMP.   - Comprehensive metabolic panel; Future  - potassium chloride ER (KLOR-CON M) 20 MEQ CR tablet; Take 1 tablet (20 mEq) by mouth daily    High priority for 2019-nCoV vaccine  Updated Covid booster.   - COVID-19,PF,MODERNA BIVALENT 18+Yrs    Need for prophylactic vaccination and inoculation against influenza  Influenza vaccine updated.     Patient has been advised of split billing requirements and indicates understanding: Yes    COUNSELING:  Reviewed preventive health counseling, as reflected in patient instructions       Regular exercise       Healthy diet/nutrition       Vision screening       Dental care       Immunizations    Vaccinated for: Influenza and Covid       Osteoporosis prevention/bone health    Estimated body mass index is 27.04 kg/m  as calculated from the following:    Height as of this encounter: 1.664 m (5' 5.5\").    Weight as of this encounter: 74.8 kg (165 lb).    Weight management plan: Discussed healthy diet and exercise guidelines    She reports that she has never smoked. She has never used smokeless tobacco.      Appropriate preventive services were discussed with this patient, including applicable screening as appropriate for cardiovascular disease, diabetes, osteopenia/osteoporosis, and glaucoma.  As appropriate for age/gender, discussed screening for colorectal cancer, prostate cancer, breast cancer, and cervical cancer. Checklist reviewing preventive services available has been given to the patient.    Reviewed patients plan of care and provided an AVS. The Intermediate Care Plan ( asthma action plan, low back pain action plan, and migraine action plan) for Loraine meets the Care Plan requirement. This Care Plan has been established and reviewed with the Patient.    Zach Nguyen PA-C  [facility]    Identified Health Risks:  "

## 2022-10-05 NOTE — PATIENT INSTRUCTIONS
Continue current medications, except for Tylenol which has been switched to a prn medicine.     We will follow-up with the results of your lab work and when you get the Bone Density scan done.   Patient Education   Personalized Prevention Plan  You are due for the preventive services outlined below.  Your care team is available to assist you in scheduling these services.  If you have already completed any of these items, please share that information with your care team to update in your medical record.  Health Maintenance Due   Topic Date Due    ANNUAL REVIEW OF HM ORDERS  Never done    Hepatitis C Screening  Never done    Zoster (Shingles) Vaccine (1 of 2) Never done    COVID-19 Vaccine (3 - Booster for Moderna series) 04/20/2021    Flu Vaccine (1) 09/01/2022

## 2022-10-06 LAB — HCV AB SERPL QL IA: NONREACTIVE

## 2022-12-08 ENCOUNTER — HOSPITAL ENCOUNTER (OUTPATIENT)
Dept: BONE DENSITY | Facility: CLINIC | Age: 77
Discharge: HOME | End: 2022-12-08
Attending: PHYSICIAN ASSISTANT | Admitting: PHYSICIAN ASSISTANT
Payer: MEDICARE

## 2022-12-08 DIAGNOSIS — M81.0 OSTEOPOROSIS, UNSPECIFIED OSTEOPOROSIS TYPE, UNSPECIFIED PATHOLOGICAL FRACTURE PRESENCE: ICD-10-CM

## 2022-12-08 PROCEDURE — 77080 DXA BONE DENSITY AXIAL: CPT

## 2023-04-06 ENCOUNTER — TELEPHONE (OUTPATIENT)
Dept: FAMILY MEDICINE | Facility: CLINIC | Age: 78
End: 2023-04-06
Payer: MEDICARE

## 2023-04-06 DIAGNOSIS — N39.498 OTHER URINARY INCONTINENCE: ICD-10-CM

## 2023-04-06 DIAGNOSIS — R62.50 DELAY IN DEVELOPMENT: Primary | ICD-10-CM

## 2023-04-06 DIAGNOSIS — R39.15 URGENCY OF URINATION: ICD-10-CM

## 2023-04-07 NOTE — TELEPHONE ENCOUNTER
DME order for Incontinent supplies faxed to [facility] at [phone].  Rosaura HAYES RN

## 2023-04-07 NOTE — TELEPHONE ENCOUNTER
Received fax from [employer].     Needing RX for the following:    Underwear/pull up: disposable -150 per month per insurance- 11 refills    Under-pad/Chux: disposable- 60 per month per insurance- 11 refills    Spoke with [employer]. No size is needed for the RX's.       I can fax orders once placed.

## 2023-06-07 ENCOUNTER — TELEPHONE (OUTPATIENT)
Dept: FAMILY MEDICINE | Facility: CLINIC | Age: 78
End: 2023-06-07
Payer: MEDICARE

## 2023-06-08 ENCOUNTER — MEDICAL CORRESPONDENCE (OUTPATIENT)
Dept: HEALTH INFORMATION MANAGEMENT | Facility: CLINIC | Age: 78
End: 2023-06-08
Payer: MEDICARE

## 2023-06-08 NOTE — TELEPHONE ENCOUNTER
Forms signed and placed at  for .   Copy sent to scanning.     Left message with [phone] (Group home) that forms are ready for .

## 2023-10-09 ENCOUNTER — OFFICE VISIT (OUTPATIENT)
Dept: FAMILY MEDICINE | Facility: CLINIC | Age: 78
End: 2023-10-09
Payer: MEDICARE

## 2023-10-09 VITALS
BODY MASS INDEX: 27.71 KG/M2 | DIASTOLIC BLOOD PRESSURE: 82 MMHG | RESPIRATION RATE: 18 BRPM | HEIGHT: 66 IN | SYSTOLIC BLOOD PRESSURE: 144 MMHG | OXYGEN SATURATION: 97 % | WEIGHT: 172.4 LBS | HEART RATE: 67 BPM | TEMPERATURE: 98.2 F

## 2023-10-09 DIAGNOSIS — R29.6 FALLS FREQUENTLY: ICD-10-CM

## 2023-10-09 DIAGNOSIS — F33.0 MILD EPISODE OF RECURRENT MAJOR DEPRESSIVE DISORDER (H): ICD-10-CM

## 2023-10-09 DIAGNOSIS — Z23 NEED FOR TDAP VACCINATION: ICD-10-CM

## 2023-10-09 DIAGNOSIS — E78.00 PURE HYPERCHOLESTEROLEMIA: ICD-10-CM

## 2023-10-09 DIAGNOSIS — R25.1 TREMOR: Primary | ICD-10-CM

## 2023-10-09 DIAGNOSIS — Z00.00 ENCOUNTER FOR MEDICARE ANNUAL WELLNESS EXAM: ICD-10-CM

## 2023-10-09 DIAGNOSIS — Z23 NEED FOR SHINGLES VACCINE: ICD-10-CM

## 2023-10-09 DIAGNOSIS — R41.3 MEMORY CHANGES: ICD-10-CM

## 2023-10-09 DIAGNOSIS — G40.909 SEIZURE DISORDER (H): ICD-10-CM

## 2023-10-09 DIAGNOSIS — R60.9 EDEMA, UNSPECIFIED TYPE: ICD-10-CM

## 2023-10-09 LAB
ALBUMIN SERPL BCG-MCNC: 4.3 G/DL (ref 3.5–5.2)
ALP SERPL-CCNC: 76 U/L (ref 35–104)
ALT SERPL W P-5'-P-CCNC: 13 U/L (ref 0–50)
ANION GAP SERPL CALCULATED.3IONS-SCNC: 11 MMOL/L (ref 7–15)
AST SERPL W P-5'-P-CCNC: 17 U/L (ref 0–45)
BILIRUB SERPL-MCNC: 0.2 MG/DL
BUN SERPL-MCNC: 17.5 MG/DL (ref 8–23)
CALCIUM SERPL-MCNC: 10.2 MG/DL (ref 8.8–10.2)
CHLORIDE SERPL-SCNC: 101 MMOL/L (ref 98–107)
CREAT SERPL-MCNC: 0.72 MG/DL (ref 0.51–0.95)
DEPRECATED HCO3 PLAS-SCNC: 30 MMOL/L (ref 22–29)
EGFRCR SERPLBLD CKD-EPI 2021: 85 ML/MIN/1.73M2
ERYTHROCYTE [DISTWIDTH] IN BLOOD BY AUTOMATED COUNT: 11.8 % (ref 10–15)
GLUCOSE SERPL-MCNC: 110 MG/DL (ref 70–99)
HCT VFR BLD AUTO: 47.7 % (ref 35–47)
HGB BLD-MCNC: 15.8 G/DL (ref 11.7–15.7)
MCH RBC QN AUTO: 32.3 PG (ref 26.5–33)
MCHC RBC AUTO-ENTMCNC: 33.1 G/DL (ref 31.5–36.5)
MCV RBC AUTO: 98 FL (ref 78–100)
PLATELET # BLD AUTO: 159 10E3/UL (ref 150–450)
POTASSIUM SERPL-SCNC: 4.5 MMOL/L (ref 3.4–5.3)
PROT SERPL-MCNC: 7.2 G/DL (ref 6.4–8.3)
RBC # BLD AUTO: 4.89 10E6/UL (ref 3.8–5.2)
SODIUM SERPL-SCNC: 142 MMOL/L (ref 135–145)
VALPROATE SERPL-MCNC: 59.8 UG/ML
WBC # BLD AUTO: 5.9 10E3/UL (ref 4–11)

## 2023-10-09 PROCEDURE — 84425 ASSAY OF VITAMIN B-1: CPT | Mod: 90 | Performed by: PHYSICIAN ASSISTANT

## 2023-10-09 PROCEDURE — 80053 COMPREHEN METABOLIC PANEL: CPT | Performed by: PHYSICIAN ASSISTANT

## 2023-10-09 PROCEDURE — G0008 ADMIN INFLUENZA VIRUS VAC: HCPCS | Mod: 59 | Performed by: PHYSICIAN ASSISTANT

## 2023-10-09 PROCEDURE — 82607 VITAMIN B-12: CPT | Performed by: PHYSICIAN ASSISTANT

## 2023-10-09 PROCEDURE — 80164 ASSAY DIPROPYLACETIC ACD TOT: CPT | Performed by: PHYSICIAN ASSISTANT

## 2023-10-09 PROCEDURE — 91320 SARSCV2 VAC 30MCG TRS-SUC IM: CPT | Performed by: PHYSICIAN ASSISTANT

## 2023-10-09 PROCEDURE — 36415 COLL VENOUS BLD VENIPUNCTURE: CPT | Performed by: PHYSICIAN ASSISTANT

## 2023-10-09 PROCEDURE — 99214 OFFICE O/P EST MOD 30 MIN: CPT | Mod: 25 | Performed by: PHYSICIAN ASSISTANT

## 2023-10-09 PROCEDURE — G0439 PPPS, SUBSEQ VISIT: HCPCS | Performed by: PHYSICIAN ASSISTANT

## 2023-10-09 PROCEDURE — 80156 ASSAY CARBAMAZEPINE TOTAL: CPT | Performed by: PHYSICIAN ASSISTANT

## 2023-10-09 PROCEDURE — 90662 IIV NO PRSV INCREASED AG IM: CPT | Performed by: PHYSICIAN ASSISTANT

## 2023-10-09 PROCEDURE — 85027 COMPLETE CBC AUTOMATED: CPT | Performed by: PHYSICIAN ASSISTANT

## 2023-10-09 PROCEDURE — 90480 ADMN SARSCOV2 VAC 1/ONLY CMP: CPT | Performed by: PHYSICIAN ASSISTANT

## 2023-10-09 RX ORDER — SIMVASTATIN 20 MG
TABLET ORAL
Qty: 30 TABLET | Refills: 11 | Status: SHIPPED | OUTPATIENT
Start: 2023-10-09

## 2023-10-09 RX ORDER — FUROSEMIDE 40 MG
40 TABLET ORAL DAILY
Qty: 30 TABLET | Refills: 11 | Status: SHIPPED | OUTPATIENT
Start: 2023-10-09

## 2023-10-09 RX ORDER — FLUOXETINE 10 MG/1
10 TABLET ORAL DAILY
Qty: 30 TABLET | Refills: 11 | Status: SHIPPED | OUTPATIENT
Start: 2023-10-09

## 2023-10-09 RX ORDER — CARBAMAZEPINE 200 MG/1
TABLET ORAL
Qty: 75 TABLET | Refills: 11 | Status: SHIPPED | OUTPATIENT
Start: 2023-10-09

## 2023-10-09 RX ORDER — DIVALPROEX SODIUM 500 MG/1
500 TABLET, DELAYED RELEASE ORAL 3 TIMES DAILY
Qty: 90 TABLET | Refills: 11 | Status: SHIPPED | OUTPATIENT
Start: 2023-10-09

## 2023-10-09 ASSESSMENT — ENCOUNTER SYMPTOMS
JOINT SWELLING: 1
FEVER: 0
EYE PAIN: 0
BREAST MASS: 0
HEADACHES: 0
DYSURIA: 0
DIZZINESS: 0
FREQUENCY: 0
CHILLS: 0
WEAKNESS: 1
MYALGIAS: 0
HEMATURIA: 0
ARTHRALGIAS: 1
PARESTHESIAS: 0
HEMATOCHEZIA: 0
SORE THROAT: 0
NAUSEA: 0
PALPITATIONS: 0
NERVOUS/ANXIOUS: 0
COUGH: 0
CONSTIPATION: 0
HEARTBURN: 0
DIARRHEA: 0
ABDOMINAL PAIN: 0
SHORTNESS OF BREATH: 0

## 2023-10-09 ASSESSMENT — PAIN SCALES - GENERAL: PAINLEVEL: NO PAIN (0)

## 2023-10-09 ASSESSMENT — PATIENT HEALTH QUESTIONNAIRE - PHQ9
SUM OF ALL RESPONSES TO PHQ QUESTIONS 1-9: 0
10. IF YOU CHECKED OFF ANY PROBLEMS, HOW DIFFICULT HAVE THESE PROBLEMS MADE IT FOR YOU TO DO YOUR WORK, TAKE CARE OF THINGS AT HOME, OR GET ALONG WITH OTHER PEOPLE: NOT DIFFICULT AT ALL
SUM OF ALL RESPONSES TO PHQ QUESTIONS 1-9: 0

## 2023-10-09 ASSESSMENT — ACTIVITIES OF DAILY LIVING (ADL): CURRENT_FUNCTION: NO ASSISTANCE NEEDED

## 2023-10-09 NOTE — PATIENT INSTRUCTIONS
Patient Education     Work up for tremor, memory changes and frequent falls. Labs and MRI and cognitive assessment.     For now, continue medicines as they are prescribed and they were all refilled.     Updated vaccines in clinic. Get TDAP and shingles vaccines at the pharmacy.     Personalized Prevention Plan  You are due for the preventive services outlined below.  Your care team is available to assist you in scheduling these services.  If you have already completed any of these items, please share that information with your care team to update in your medical record.  Health Maintenance Due   Topic Date Due    Zoster (Shingles) Vaccine (1 of 2) Never done    Diptheria Tetanus Pertussis (DTAP/TDAP/TD) Vaccine (3 - Td or Tdap) 03/26/2023    Flu Vaccine (1) 09/01/2023    COVID-19 Vaccine (4 - 2023-24 season) 09/01/2023    ANNUAL REVIEW OF HM ORDERS  10/05/2023     Preventing Falls: Care Instructions    Talk to your doctor about the medicines you take. Ask if any of them increase the risk of falls and whether they can be changed or stopped.   Try to exercise regularly. It can help improve your strength and balance. This can help lower your risk of falling.     Practice fall safety and prevention.    Wear low-heeled shoes that fit well and give your feet good support. Talk to your doctor if you have foot problems that make this hard.  Carry a cellphone or wear a medical alert device that you can use to call for help.  Use stepladders instead of chairs to reach high objects. Don't climb if you're at risk for falls. Ask for help, if needed.  Wear the correct eyeglasses, if you need them.    Make your home safer.    Remove rugs, cords, clutter, and furniture from walkways.  Keep your house well lit. Use night-lights in hallways and bathrooms.  Install and use sturdy handrails on stairways.  Wear nonskid footwear, even inside. Don't walk barefoot or in socks without shoes.    Be safe outside.    Use handrails, curb cuts,  "and ramps whenever possible.  Keep your hands free by using a shoulder bag or backpack.  Try to walk in well-lit areas. Watch out for uneven ground, changes in pavement, and debris.  Be careful in the winter. Walk on the grass or gravel when sidewalks are slippery. Use de-icer on steps and walkways. Add non-slip devices to shoes.    Put grab bars and nonskid mats in your shower or tub and near the toilet. Try to use a shower chair or bath bench when bathing.   Get into a tub or shower by putting in your weaker leg first. Get out with your strong side first. Have a phone or medical alert device in the bathroom with you.   Where can you learn more?  Go to https://www.[employer].net/patiented  Enter G117 in the search box to learn more about \"Preventing Falls: Care Instructions.\"  Current as of: November 9, 2022               Content Version: 13.7    9009-0311 [employer]   Care instructions adapted under license by your healthcare professional. If you have questions about a medical condition or this instruction, always ask your healthcare professional. [employer] disclaims any warranty or liability for your use of this information.      How to Get Up Safely After a Fall: Care Instructions  Overview     If you have injuries, health problems, or other reasons that may make it easy for you to fall at home, it is a good idea to learn how to get up safely after a fall. Learning how to get up correctly can help you avoid making an injury worse.  Also, knowing what to do if you cannot get up can help you stay safe until help arrives.  Follow-up care is a key part of your treatment and safety. Be sure to make and go to all appointments, and call your doctor if you are having problems. It's also a good idea to know your test results and keep a list of the medicines you take.  How can you care for yourself after a fall?  If you think you can get up  First lie still for a few minutes and think about " how you feel. If your body feels okay and you think you can get up safely, follow the rest of the steps below:  Look for a chair or other piece of furniture that is close to you.  Roll onto your side and rest. Roll by turning your head in the direction you want to roll, move your shoulder and arm, then hip and leg in the same direction.  Lie still for a moment to let your blood pressure adjust.  Slowly push your upper body up, lift your head, and take a moment to rest.  Slowly get up on your hands and knees, and crawl to the chair or other stable piece of furniture.  Put your hands on the chair.  Move one foot forward, and place it flat on the floor. Your other leg should be bent with the knee on the floor.  Rise slowly, turn your body, and sit in the chair. Stay seated for a bit and think about how you feel. Call for help. Even if you feel okay, let someone know what happened to you. You might not know that you have a serious injury.  If you cannot get up  If you think you are injured after a fall or you cannot get up, try not to panic.  Call out for help.  If you have a phone within reach or you have an emergency call device, use it to call for help.  If you do not have a phone within reach, try to slide yourself toward it. If you cannot get to the phone, try to slide toward a door or window or a place where you think you can be heard.  [place] or use an object to make noise so someone might hear you.  If you can reach something that you can use for a pillow, place it under your head. Try to stay warm by covering yourself with a blanket or clothing while you wait for help.  When should you call for help?   Call 911 anytime you think you may need emergency care. For example, call if:    You passed out (lost consciousness).     You cannot get up after a fall.     You have severe pain.   Call your doctor now or seek immediate medical care if:    You have new or worse pain.     You are dizzy or lightheaded.     You hit  "your head.   Watch closely for changes in your health, and be sure to contact your doctor if:    You do not get better as expected.   Where can you learn more?  Go to https://www.[employer].net/patiented  Enter G513 in the search box to learn more about \"How to Get Up Safely After a Fall: Care Instructions.\"  Current as of: November 14, 2022               Content Version: 13.7    2464-3693 [employer]   Care instructions adapted under license by your healthcare professional. If you have questions about a medical condition or this instruction, always ask your healthcare professional. [employer] disclaims any warranty or liability for your use of this information.         "

## 2023-10-09 NOTE — PROGRESS NOTES
"SUBJECTIVE:   Loraine is a 78 year old who presents for Preventive Visit.      10/9/2023     2:42 PM   Additional Questions   Roomed by Helen CADET CMA     Are you in the first 12 months of your Medicare coverage?  No    Healthy Habits:     In general, how would you rate your overall health?  Good    Frequency of exercise:  6-7 days/week    Duration of exercise:  15-30 minutes    Do you usually eat at least 4 servings of fruit and vegetables a day, include whole grains    & fiber and avoid regularly eating high fat or \"junk\" foods?  Yes    Taking medications regularly:  Yes    Medication side effects:  None    Ability to successfully perform activities of daily living:  No assistance needed    Home Safety:  No safety concerns identified    Hearing Impairment:  No hearing concerns    In the past 6 months, have you been bothered by leaking of urine?  No    In general, how would you rate your overall mental or emotional health?  Good    Additional concerns today:  Yes    Today's PHQ-9 Score:       10/9/2023     2:36 PM   PHQ-9 SCORE   PHQ-9 Total Score MyChart 0   PHQ-9 Total Score 0     Have you ever done Advance Care Planning? (For example, a Health Directive, POLST, or a discussion with a medical provider or your loved ones about your wishes): No, advance care planning information given to patient to review.  Patient plans to discuss their wishes with loved ones or provider.      Fall risk  Fallen 2 or more times in the past year?: Yes  Any fall with injury in the past year?: No    Cognitive Screening   1) Repeat 3 items (Leader, Season, Table)    2) Clock draw:  when asked if she could make her clock say 10 past 11 she said no   3) 3 item recall: Recalls NO objects   Results: 0 items recalled: PROBABLE COGNITIVE IMPAIRMENT, **INFORM PROVIDER**    Mini-CogTM Copyright ROSEMARY Leggett. Licensed by the author for use in [facility]; reprinted with permission (babs@.edu). All rights reserved.      Do you have sleep " apnea, excessive snoring or daytime drowsiness? : no    Reviewed and updated as needed this visit by clinical staff   Tobacco  Allergies  Meds  Problems  Med Hx  Surg Hx  Fam Hx          Reviewed and updated as needed this visit by Provider   Tobacco  Allergies  Meds  Problems  Med Hx  Surg Hx  Fam Hx         Social History     Tobacco Use    Smoking status: Never    Smokeless tobacco: Never   Substance Use Topics    Alcohol use: No             10/9/2023     2:40 PM   Alcohol Use   Prescreen: >3 drinks/day or >7 drinks/week? Not Applicable          No data to display              Do you have a current opioid prescription? No  Do you use any other controlled substances or medications that are not prescribed by a provider? None    Falls     Duration: 8 months   Description (location/character/radiation): Staff has noted 4 falls in the last 8 months.   Intensity:  moderate  Accompanying signs and symptoms: Also they have noticed a new shakiness in the last 6-8 months   History (similar episodes/previous evaluation): None  Precipitating or alleviating factors: None  Therapies tried and outcome: None     Memory Concerns   Forgetfulness and trouble recalling things      Current providers sharing in care for this patient include:   Patient Care Team:  Zach Nguyen PA-C as PCP - General (Family Medicine)  Zach Nguyen PA-C as Assigned PCP    The following health maintenance items are reviewed in Epic and correct as of today:  Health Maintenance   Topic Date Due    ZOSTER IMMUNIZATION (1 of 2) Never done    RSV VACCINE 60+ (1 - 1-dose 60+ series) Never done    DTAP/TDAP/TD IMMUNIZATION (3 - Td or Tdap) 03/26/2023    MEDICARE ANNUAL WELLNESS VISIT  10/09/2024    ANNUAL REVIEW OF HM ORDERS  10/09/2024    FALL RISK ASSESSMENT  10/09/2024    LIPID  06/24/2026    ADVANCE CARE PLANNING  10/11/2028    DEXA  12/08/2037    HEPATITIS C SCREENING  Completed    INFLUENZA VACCINE  Completed    Pneumococcal  "Vaccine: 65+ Years  Completed    COVID-19 Vaccine  Completed    IPV IMMUNIZATION  Aged Out    HPV IMMUNIZATION  Aged Out    MENINGITIS IMMUNIZATION  Aged Out    MAMMO SCREENING  Discontinued    COLORECTAL CANCER SCREENING  Discontinued     Pertinent mammograms are reviewed under the imaging tab.    Review of Systems   Constitutional:  Negative for chills and fever.   HENT:  Negative for congestion, ear pain, hearing loss and sore throat.    Eyes:  Negative for pain and visual disturbance.   Respiratory:  Negative for cough and shortness of breath.    Cardiovascular:  Positive for peripheral edema. Negative for chest pain and palpitations.   Gastrointestinal:  Negative for abdominal pain, constipation, diarrhea, heartburn, hematochezia and nausea.   Breasts:  Negative for tenderness, breast mass and discharge.   Genitourinary:  Negative for dysuria, frequency, genital sores, hematuria, pelvic pain, urgency, vaginal bleeding and vaginal discharge.   Musculoskeletal:  Positive for arthralgias and joint swelling. Negative for myalgias.   Skin:  Negative for rash.   Neurological:  Positive for weakness. Negative for dizziness, headaches and paresthesias.   Psychiatric/Behavioral:  Negative for mood changes. The patient is not nervous/anxious.        OBJECTIVE:   BP (!) 144/82   Pulse 67   Temp 98.2  F (36.8  C) (Tympanic)   Resp 18   Ht 1.676 m (5' 6\")   Wt 78.2 kg (172 lb 6.4 oz)   LMP 04/02/2004 (Approximate)   SpO2 97%   BMI 27.83 kg/m   Estimated body mass index is 27.83 kg/m  as calculated from the following:    Height as of this encounter: 1.676 m (5' 6\").    Weight as of this encounter: 78.2 kg (172 lb 6.4 oz).  Physical Exam  GENERAL: healthy, alert and no distress  EYES: Eyes grossly normal to inspection, PERRL and conjunctivae and sclerae normal  NECK: no adenopathy, no asymmetry, masses, or scars and thyroid normal to palpation  RESP: lungs clear to auscultation - no rales, rhonchi or wheezes  CV: " "regular rate and rhythm, normal S1 S2, no S3 or S4, no murmur, click or rub, no peripheral edema and peripheral pulses strong  ABDOMEN: soft, nontender, no hepatosplenomegaly, no masses and bowel sounds normal  MS: no gross musculoskeletal defects noted, no edema  NEURO: Normal strength and tone, sensory exam grossly normal, mentation intact but speech is slowed compared to baseline, unable to recall \"no ifs, ands, or butts\", cranial nerves 2-12 intact, and dysmetria with FNF especially with the left hand, mild intention tremor present, no resting tremor  PSYCH: mentation appears normal and affect normal/bright    Diagnostic Test Results:  Labs reviewed in Epic    ASSESSMENT / PLAN:   Tremor  Memory changes  Falls frequently  Unclear etiology. Progressive onset. Needing to use walker much more frequently and has fallen 3 times in the last few months. Group home staff is concerned. Exam with subtle changes including more short term memory issues. Suspect a neurologic or medication-related cause. Will check Depakote, Carbamazepine levels in addition to Vit B12, B1 levels, CBC, CMP and obtain an brain MRI. OT eval for better cognitive assessment.   - MR Brain w/o Contrast; Future  - Occupational Therapy Referral; Future  - Vitamin B12; Future  - Vitamin B1 whole blood; Future    Seizure disorder (H)  Due for refill. Check levels, and basic labs today.   - Valproic acid; Future  - carBAMazepine (TEGRETOL) 200 MG tablet; Take 1 tablet (200 mg) by mouth every morning AND 1.5 tablets (300 mg) every evening. 200 mg in Am and 300 mg in the PM  - divalproex sodium delayed-release (DEPAKOTE) 500 MG DR tablet; Take 1 tablet (500 mg) by mouth 3 times daily  - Carbamazepine total; Future  - Valproic acid  - Carbamazepine total    Mild episode of recurrent major depressive disorder (H24)  Stable. Refilled Zoloft.   - FLUoxetine HCl, PMDD, 10 MG TABS; Take 10 mg by mouth daily  - FLUoxetine HCl, PMDD, 20 MG CAPS; Take 20 mg by " mouth daily    Edema, unspecified type  Stable. Recheck CMP.   - furosemide (LASIX) 40 MG tablet; Take 1 tablet (40 mg) by mouth daily    Pure hypercholesterolemia  Due for refill.   - simvastatin (ZOCOR) 20 MG tablet; TAKE 1 TABLET BY MOUTH AT BEDTIME    Encounter for Medicare annual wellness exam  78 yr old here for Medicare Wellness exam. Last MWE done 1 year(s) ago. Discussed preventative screenings which are updated below. Counseled on immunizations and healthy lifestyle. Follow-up in 1 year for repeat physical exam.     Need for shingles vaccine  Will complete at pharmacy.   - zoster vaccine recombinant adjuvanted (SHINGRIX) injection; Inject 0.5 mLs into the muscle once for 1 dose Pharmacist administered    Need for Tdap vaccination  Will complete at pharmacy.   - Tdap, tetanus-diptheria-acell pertussis, (BOOSTRIX) 5-2.5-18.5 LF-MCG/0.5 REUBEN injection; Inject 0.5 mLs into the muscle once for 1 dose    Patient has been advised of split billing requirements and indicates understanding: Yes      COUNSELING:  Reviewed preventive health counseling, as reflected in patient instructions    She reports that she has never smoked. She has never used smokeless tobacco.    Appropriate preventive services were discussed with this patient, including applicable screening as appropriate for fall prevention, nutrition, physical activity, Tobacco-use cessation, weight loss and cognition.  Checklist reviewing preventive services available has been given to the patient.    Reviewed patients plan of care and provided an AVS. The Intermediate Care Plan ( asthma action plan, low back pain action plan, and migraine action plan) for Loraine meets the Care Plan requirement. This Care Plan has been established and reviewed with the Patient and caregiver.    Zach Nguyen PA-C  [facility]    Identified Health Risks:  I have reviewed Opioid Use Disorder and Substance Use Disorder risk factors and made any  needed referrals.

## 2023-10-10 ENCOUNTER — TELEPHONE (OUTPATIENT)
Dept: FAMILY MEDICINE | Facility: CLINIC | Age: 78
End: 2023-10-10
Payer: MEDICARE

## 2023-10-10 LAB
CARBAMAZEPINE SERPL-MCNC: 7.5 UG/ML (ref 4–12)
VIT B12 SERPL-MCNC: 1160 PG/ML (ref 232–1245)

## 2023-10-10 NOTE — TELEPHONE ENCOUNTER
Per 10-10-23 OV-  Updated vaccines in clinic. Get TDAP and shingles vaccines at the pharmacy.        [facility] staff, informed of above, voices understanding.  JOSÉ LUIS Marshall RN

## 2023-10-10 NOTE — TELEPHONE ENCOUNTER
Medication Question     Contacts         Type Contact Phone/Fax    10/10/2023 08:33 AM CDT Phone (Incoming) [facility] ([facility]) [phone]            What medication are you calling about (include dose and sig)?: Pt was sent from Pharmacy DTAP and [place] for Home care agency to administer.  [facility] is concerned were they suppose to get this or was pt suppose to go to Pharmacy to get.  Please Advise.    Preferred Pharmacy:   [facility] [address]  [address] [facility] [address] [address]  Phone: [phone] Fax: [phone]      Controlled Substance Agreement on file:   CSA -- Patient Level:    CSA: None found at the patient level.       Who prescribed the medication?: Patrick      Okay to leave a detailed message?: Yes at Home number on file [phone](home)    Susanna Zyrra [facility] Sec

## 2023-10-13 LAB — VIT B1 PYROPHOSHATE BLD-SCNC: 217 NMOL/L

## 2023-10-31 ENCOUNTER — TELEPHONE (OUTPATIENT)
Dept: FAMILY MEDICINE | Facility: CLINIC | Age: 78
End: 2023-10-31
Payer: MEDICARE

## 2023-10-31 NOTE — TELEPHONE ENCOUNTER
Spoke with Imaging. They are need to know the make/model of bladder stimulator along with the serial number, Controls and leads. Rad tech states that patient should have information card with this info. Once scheduling has that information, they can find a machine to schedule with.

## 2023-10-31 NOTE — TELEPHONE ENCOUNTER
Please call group home staff and ask for the information below from Loraine. If they do not have it or cannot find it, then we will change the type of imaging study (from MRI to CT) to evaluate her symptoms.     Zach Nguyen PA-C

## 2023-10-31 NOTE — TELEPHONE ENCOUNTER
Per group home staff:  Bladder stimulator-    - implant date 01-, provider Dr. Cheek  -serial number [id number]  -model number [id number]  -phone contact [phone]    Forwarded to [facility], will need new MRI order.  JOSÉ LUIS Marshall RN

## 2023-10-31 NOTE — TELEPHONE ENCOUNTER
Spoke with Paola, staff, who will look for this information and call care team back.  JOSÉ LUIS Marshall RN

## 2023-11-01 NOTE — TELEPHONE ENCOUNTER
Please call Radiology back and see which machine/location patient can have her MRI done at?     [place]

## 2023-11-01 NOTE — TELEPHONE ENCOUNTER
Spoke with imaging. Unable to get radha of [facility]. Device safety order was placed with the devices information.  states that usually within a few days, they will find a location to schedule with. Note stated to call [facility] office once location is found.

## 2023-11-06 NOTE — TELEPHONE ENCOUNTER
Spoke with Imaging and [facility] took patients information regarding her bladder stimulator. Imaging tech will look into this and give us a call back- usually within 24-48 hours.

## 2023-11-07 NOTE — TELEPHONE ENCOUNTER
Imaging called. Patient is to be scheduled for MRI at [place]. Will need her bladder stimulator remote fully charged and with her for the MRI. Imaging will also be faxing over a safety checklist for the patient. Once received, I will call group home to notify and have them schedule a time that works for them.

## 2023-11-07 NOTE — TELEPHONE ENCOUNTER
Received form. Spoke with Xin from [facility] and faxed over safety forms and the information below to [phone]

## 2023-11-30 ENCOUNTER — THERAPY VISIT (OUTPATIENT)
Dept: OCCUPATIONAL THERAPY | Facility: CLINIC | Age: 78
End: 2023-11-30
Attending: PHYSICIAN ASSISTANT
Payer: MEDICARE

## 2023-11-30 DIAGNOSIS — R41.3 MEMORY CHANGES: ICD-10-CM

## 2023-11-30 DIAGNOSIS — R25.1 TREMOR: ICD-10-CM

## 2023-11-30 DIAGNOSIS — R29.6 FALLS FREQUENTLY: ICD-10-CM

## 2023-11-30 PROCEDURE — 97165 OT EVAL LOW COMPLEX 30 MIN: CPT | Mod: GO

## 2023-11-30 PROCEDURE — 97535 SELF CARE MNGMENT TRAINING: CPT | Mod: GO

## 2023-11-30 NOTE — PROGRESS NOTES
OCCUPATIONAL THERAPY EVALUATION  Type of Visit: Evaluation    See electronic medical record for Abuse and Falls Screening details.    Subjective      Presenting condition or subjective complaint:  falling and tremors   Date of onset: 10/09/23 (order date)    Relevant medical history:   see epic   Dates & types of surgery:  see epic     Prior diagnostic imaging/testing results:       Prior therapy history for the same diagnosis, illness or injury:    No    Prior Level of Function  Transfers: Independent  Ambulation: Independent, Assistive equipment - 4WW  ADL: Independent  IADL:  Lives in a group home and gets assistance with I/ADLS    Living Environment  Social support:   Lives in a group home, 4 other roommmates, 24/7 staff at home  Type of home:   House  Stairs to enter the home:       No stairs   Ramp:   N/A  Stairs inside the home:       Pt does not have to manage stairs inside the house - although there is an upstairs. All needs met on main level.   Help at home:   Help with showers and meals    Equipment owned:   walker, grab bars, raised toilet seat    Employment:    N/A  Hobbies/Interests:      Patient goals for therapy:  Fallings and shakiness    Pain assessment: Pain denied     Objective     Cognitive Status Examination  Orientation: Oriented to person, place and time   Level of Consciousness: Alert  Follows Commands and Answers Questions: 100% of the time, 75% of the time  Personal Safety and Judgement:  Group home staff who was present for session reported the pt got lost for a brief period of time walking outside the house - but was found right  away. Staff with group home members 24/7.   Memory: Impaired Typically has a great memory which is why they have been noticing it, difficult with word finding at times, will sometimes take her additional time to register things.   Attention: No deficits identified  Organization/Problem Solving: No deficits identified  Executive Function: Working memory impaired,  decreased storage of information for performing tasks    VISUAL SKILLS  Vision: No concerns     SENSATION:  N/A    POSTURE: WFL  RANGE OF MOTION: UE AROM WFL  STRENGTH: UE Strength WFL  MUSCLE TONE: WFL  COORDINATION: WFL  BALANCE:  Frequent falls. Refer to physical therapy.     Staff reports that she feels that all falls could have been avoided. Falls occurred because: she left the  open and decided to try and walk around the open  and fell, tripped and fell over another group members wheelchair, and one time she was pulling the shower chair out of the bathroom and she stepped on the belt part and lost her balance and fell down.    Tremors: seems to have intentions tremors, sometimes has a hard time holding onto her gerbil, she will demonstrate tremors when cleaning her gerbil's cage, when completing embroidery. Tremors seems to be the most present in the morning and evening when she is tired.     FUNCTIONAL MOBILITY  Assistive Device(s): Walker (four wheeled)  Ambulation: Ambulatory with 4WW  Wheelchair: N/A    BED MOBILITY: Independent     TRANSFERS: Independent - has a lift chair but doesn't use it     BATHING: SBA, Min Assist - she gets her hair washed and help washing her  back  Equipment: Grab bar, Shower chair/Tub bench, bath mat    UPPER BODY DRESSING: Independent   Equipment:  N/A    LOWER BODY DRESSING: Independent  Equipment:  N/A    TOILETING: Independent  Bladder incontinence at times. Has a bladder stimulator but doesn't use it.   Equipment:  N/A    GROOMING: Independent - Gets help with earrings   Equipment:  N/A  - Electric toothbrush     EATING/SELF FEEDING: Independent, makes her breakfast and lunch, the group home makes and providers dinner    Equipment:  N/A    ACTIVITY TOLERANCE: Walks 20 minutes a day with her 4WW     INSTRUMENTAL ACTIVITIES OF DAILY LIVING (IADL): Get assistance with IADLS from group home staff. Does her own laundry.   Meal Planning/Prep: Preps her own  breakfast and lunch, family meal with group home  Home/Financial Management: Group home staff manage majority of the finances   Communication/Computer Use: N/A  Community Mobility: Gets assistance with transportation from group staff  Care of Others: Takes care of her gerbil - she cleans the cage and feeds her gerbil   Medication management: staff manages medications     Assessment & Plan   CLINICAL IMPRESSIONS  Medical Diagnosis: Falls frequently  Tremor  Memory changes    Treatment Diagnosis: decreased IND in ADLs    Impression/Assessment: Pt is a 78 year old female presenting to Occupational Therapy due to frequent falls, memory changes, and tremors.  The following significant findings have been identified: Impaired activity tolerance, Impaired balance, Impaired cognition, Impaired mobility, and Impaired motor control.  These identified deficits interfere with their ability to perform self care tasks, recreational activities, household chores, household mobility, and community mobility as compared to previous level of function.     Clinical Decision Making (Complexity):  Assessment of Occupational Performance: 1-3 Performance Deficits  Occupational Performance Limitations: bathing/showering, dressing, feeding, hygiene and grooming, care of pets, driving and community mobility, home establishment and management, and leisure activities  Clinical Decision Making (Complexity): Low complexity    PLAN OF CARE  Treatment Interventions:  Interventions: Self-Care/Home Management, Therapeutic Activity, Therapeutic Exercise    Long Term Goals   OT Goal 1  Goal Identifier: Falls  Goal Description: Loraine and caregiver will demonstrate understanding of fall prevents techniques to minimize the amount of falls.  Rationale: In order to maximize safety and independence with performance of self-care activities  Target Date: 01/25/24  OT Goal 2  Goal Identifier: Tremor management  Goal Description: Loraine and caregivers will  demonstrate understanding on tremor management to aid in increased participate in leisure activities and ADLS.  Rationale: In order to maximize safety and independence with performance of self-care activities  Target Date: 01/25/24  OT Goal 3  Goal Identifier: Cognitive  Goal Description: Loraine will participate in cognitive assessment as appropriate to give safe recommendations for home and in the community.  Rationale: In order to maximize safety and independence with performance of self-care activities  Target Date: 01/25/24      Frequency of Treatment: 1-2 follow up appointments as needed  Duration of Treatment: 6-8 weeks     Recommended Referrals to Other Professionals: Physical Therapy - will play referral for PT due to frequent falls  Education Assessment: Learner/Method: Patient;Caregiver  Education Comments: Plan of care.     Risks and benefits of evaluation/treatment have been explained.   Patient/Family/caregiver agrees with Plan of Care.     Evaluation Time:    OT Eval, Low Complexity Minutes (78378): 35   Present: Not applicable     Signing Clinician: CLOVER Henson [facility]                                                                                   OUTPATIENT OCCUPATIONAL THERAPY      PLAN OF TREATMENT FOR OUTPATIENT REHABILITATION   Patient's Last Name, First Name, TAD AvilesLoraine YOB: 1945   Provider's Name   [facility]   Medical Record No.  [id number]     Onset Date: 10/09/23 (order date) Start of Care Date: 11/30/23     Medical Diagnosis:  Falls frequently  Tremor  Memory changes      OT Treatment Diagnosis:  decreased IND in ADLs Plan of Treatment  Frequency/Duration:1-2 follow up appointments as needed/6-8 weeks    Certification date from 11/30/23   To 01/25/24        See note for plan of treatment details and functional goals     CLOVER Henson  CERTIFY THE NEED FOR THESE SERVICES FURNISHED UNDER        THIS PLAN OF TREATMENT AND WHILE UNDER MY CARE     (Physician attestation of this document indicates review and certification of the therapy plan).              Referring Provider:  Zach Nguyen    Initial Assessment  See Epic Evaluation- 11/30/23

## 2023-12-02 PROBLEM — R41.3 MEMORY CHANGES: Status: ACTIVE | Noted: 2023-12-02

## 2023-12-02 PROBLEM — R25.1 TREMOR: Status: ACTIVE | Noted: 2023-12-02

## 2023-12-02 PROBLEM — R29.6 FALLS FREQUENTLY: Status: ACTIVE | Noted: 2023-12-02

## 2023-12-04 ENCOUNTER — TELEPHONE (OUTPATIENT)
Dept: OCCUPATIONAL THERAPY | Facility: CLINIC | Age: 78
End: 2023-12-04
Payer: MEDICARE

## 2023-12-04 DIAGNOSIS — R29.6 FALLS FREQUENTLY: Primary | ICD-10-CM

## 2023-12-04 NOTE — TELEPHONE ENCOUNTER
Good morning,     I met with Loraine last week and after discussion with her and her group home staff member feel like a physical therapy evaluation would be appropriate as well due to her frequently falls.     I am placing an order for an physical therapy evaluation, please sign if in agreement.    Thanks!    Elayne Beltran, OTR/L

## 2023-12-20 ENCOUNTER — TELEPHONE (OUTPATIENT)
Dept: FAMILY MEDICINE | Facility: CLINIC | Age: 78
End: 2023-12-20
Payer: MEDICARE

## 2023-12-20 DIAGNOSIS — R15.9 FECAL INCONTINENCE: Primary | ICD-10-CM

## 2023-12-20 NOTE — TELEPHONE ENCOUNTER
Fax received from [employer].    Asking for orders:    Gloves- 4 boxes of 100 per month. Please include diagnosis code of incontinence of feces to be billable to insurance. Include refills for 1 year.     Order can be faxed to [phone] ATTN Crystal AZUL     Can call [phone] with questions

## 2024-01-11 ENCOUNTER — THERAPY VISIT (OUTPATIENT)
Dept: PHYSICAL THERAPY | Facility: CLINIC | Age: 79
End: 2024-01-11
Attending: PHYSICIAN ASSISTANT
Payer: MEDICARE

## 2024-01-11 DIAGNOSIS — R29.6 FALLS FREQUENTLY: Primary | ICD-10-CM

## 2024-01-11 PROCEDURE — 97112 NEUROMUSCULAR REEDUCATION: CPT | Mod: GP | Performed by: PHYSICAL MEDICINE & REHABILITATION

## 2024-01-11 PROCEDURE — 97161 PT EVAL LOW COMPLEX 20 MIN: CPT | Mod: GP | Performed by: PHYSICAL MEDICINE & REHABILITATION

## 2024-01-11 NOTE — PROGRESS NOTES
"PHYSICAL THERAPY EVALUATION  Type of Visit: Evaluation    See electronic medical record for Abuse and Falls Screening details.    Subjective   Pt arrived to PT today with PCA for impaired balance and increased falls in the last few months. Pt notes she also has been feeling more weak that she did 5 years ago. Pt reports no sx of pain. Shared she has broken R ankle 6 times and left ankle 1 time. Notes she has also had L knee surgery. Notes increased difficulty with memory, shaky walking, shuffling.   Date of onset: 07/11/23 (\"started 6 months ago\")   Relevant medical history per pt report:   arthritis, baldder or bowel, dizziness, depression, history of fractures, osteoporosis, seizures  Dates & types of surgery:  see subjective    Prior diagnostic imaging/testing results:    no  Prior therapy history for the same diagnosis, illness or injury:    has seen OT    Prior Level of Function  Transfers:  with 4ww  Ambulation:  with 4ww  ADL:  with 4ww  IADL:  lives in group home    Living Environment  Social support:   lives in adult foster care  Type of home:   house/group home  Stairs to enter the home:   no  Ramp:   N/A, 1 level  Stairs inside the home:   no  Help at home:  help with self cares, home management tasks, home and yard work, medication and/or finances, assist for driving and community activities  Equipment owned:   4ww  Employment:   disabled  Hobbies/Interests:      Patient goals for therapy:  improve tolerance to walking/walking outside, improve care with gerbils     Pain assessment: Pt reports no pain.     Objective      Cognitive Status Examination  Orientation: Oriented to person, place and time   Level of Consciousness: Alert  Follows Commands and Answers Questions:  80% of the time  Personal Safety and Judgement:  good in regards to locking/unlocking 4ww  Memory: Impaired    INTEGUMENTARY: edema noted in LE  POSTURE: rounded shoulders, forward head; standing with increased knee flexion B " (R>L)  PALPATION: no pain  RANGE OF MOTION: limited hamstrings B (R>L), limited gastroc and soleus B  STRENGTH: hip flexion: 4+/5 B, seated hip abd/add: 5-/5 B, hip IR/ER B: 4+/5 each, knee ext: 5-/5 B, knee flexion on R: 4-/5, knee flexion on L: 4+/5    BED MOBILITY: WFL    TRANSFERS:  use of UE for sit <> stand    WHEELCHAIR MOBILITY: N/A    GAIT:   Level of [place]: SBA  Assistive Device(s): Walker (four wheeled)  Gait Deviations:  pt amb with decreased stride length, decreased sravani, increased NALINI, increased knee flexion B (R>L), forward head hunched posture  Gait Distance: 40'  Stairs: pt able to ascend/descend stairs reciprocally in clinic with use of B handrails    BALANCE:     TUG classic: 20.95 seconds with use of 4ww    Mod CTSIB:  Condition 1: 30 seconds, min sway  Condition 2: 30 seconds, mod sway  Condition 3: 6 seconds, mod-max sway  Condition 4: 0 seconds    FGA: 12/30      Assessment & Plan   CLINICAL IMPRESSIONS  Medical Diagnosis: Falls frequently    Treatment Diagnosis: Impaired balance   Impression/Assessment: Patient is a 78 year old female with impaired balance complaints.  The following significant findings have been identified: Decreased ROM/flexibility, Decreased joint mobility, Decreased strength, Impaired balance, Decreased proprioception, Edema, Impaired gait, Impaired muscle performance, Decreased activity tolerance, and Impaired posture. These impairments interfere with their ability to perform self care tasks, recreational activities, household chores, household mobility, and community mobility as compared to previous level of function.     Clinical Decision Making (Complexity):  Clinical Presentation: Stable/Uncomplicated  Clinical Presentation Rationale: based on medical and personal factors listed in PT evaluation  Clinical Decision Making (Complexity): Low complexity    PLAN OF CARE  Treatment Interventions:  Modalities:  none, no pain  Interventions: Gait Training, Manual  "Therapy, Neuromuscular Re-education, Therapeutic Activity, Therapeutic Exercise, Self-Care/Home Management    Long Term Goals   PT Goal 1  Goal Identifier: 1  Goal Description: Pt will be able to perform TUG in less than 14 seconds in order to demonstrate improved gait and improved balance  Target Date: 02/08/24  PT Goal 2  Goal Identifier: 2  Goal Description: Pt will be able to demonstrate 30 seconds with first 3 conditions of modified CTSIB, indicating improve balance and decreased fall risk  Target Date: 02/22/24  PT Goal 3  Goal Identifier: 3  Goal Description: Pt will be independent with HEP in order to self manage symptoms  Target Date: 03/07/24  PT Goal 4  Goal Identifier: 4  Goal Description: Pt will be able to improve FGA by 8 points, indicating improve balance and decreased fall risk  Target Date: 03/07/24    Frequency of Treatment: 1x/week  Duration of Treatment: 8 weeks    Recommended Referrals to Other Professionals:  none  Education Assessment:   Learner/Method: Patient;Listening;Reading;Caregiver;Demonstration;Pictures/Video  Education Comments: pt and PCA demonstrated and verbalized good understanding    Risks and benefits of evaluation/treatment have been explained.   Patient/Family/caregiver agrees with Plan of Care.     Evaluation Time:  PT Nora Low Complexity Minutes (03399): 25   Present: Not applicable     Signing Clinician: Ashley Aviles, PT      [facility]                                                                                   OUTPATIENT PHYSICAL THERAPY    PLAN OF TREATMENT FOR OUTPATIENT REHABILITATION   Patient's Last Name, First Name, Loraine Solorio YOB: 1945   Provider's Name   [facility]   Medical Record No.  [id number]     Onset Date: 07/11/23 (\"started 6 months ago\")  Start of Care Date: 01/11/24     Medical Diagnosis:  Falls frequently      PT Treatment Diagnosis:  Impaired " balance Plan of Treatment  Frequency/Duration: 1x/week/ 8 weeks    Certification date from 01/11/24 to 03/07/24         See note for plan of treatment details and functional goals     Ashley Aviles, PT                         I CERTIFY THE NEED FOR THESE SERVICES FURNISHED UNDER        THIS PLAN OF TREATMENT AND WHILE UNDER MY CARE     (Physician attestation of this document indicates review and certification of the therapy plan).              Referring Provider:  Zach Nguyen    Initial Assessment  See Epic Evaluation- Start of Care Date: 01/11/24

## 2024-01-15 ENCOUNTER — THERAPY VISIT (OUTPATIENT)
Dept: PHYSICAL THERAPY | Facility: CLINIC | Age: 79
End: 2024-01-15
Attending: PHYSICIAN ASSISTANT
Payer: MEDICARE

## 2024-01-15 DIAGNOSIS — R29.6 FALLS FREQUENTLY: Primary | ICD-10-CM

## 2024-01-15 PROCEDURE — 97110 THERAPEUTIC EXERCISES: CPT | Mod: GP | Performed by: PHYSICAL MEDICINE & REHABILITATION

## 2024-01-15 PROCEDURE — 97112 NEUROMUSCULAR REEDUCATION: CPT | Mod: GP | Performed by: PHYSICAL MEDICINE & REHABILITATION

## 2024-01-22 NOTE — PROGRESS NOTES
DISCHARGE  Reason for Discharge: Patient has met all goals.  Patient has failed to schedule further appointments.      Discharge Plan: Patient to continue home program.    Referring Provider:  Zach Nguyen

## 2024-02-01 ENCOUNTER — THERAPY VISIT (OUTPATIENT)
Dept: PHYSICAL THERAPY | Facility: CLINIC | Age: 79
End: 2024-02-01
Attending: PHYSICIAN ASSISTANT
Payer: MEDICARE

## 2024-02-01 DIAGNOSIS — R29.6 FALLS FREQUENTLY: Primary | ICD-10-CM

## 2024-02-01 PROCEDURE — 97112 NEUROMUSCULAR REEDUCATION: CPT | Mod: GP | Performed by: PHYSICAL MEDICINE & REHABILITATION

## 2024-02-01 PROCEDURE — 97110 THERAPEUTIC EXERCISES: CPT | Mod: GP | Performed by: PHYSICAL MEDICINE & REHABILITATION

## 2024-02-06 ENCOUNTER — THERAPY VISIT (OUTPATIENT)
Dept: PHYSICAL THERAPY | Facility: CLINIC | Age: 79
End: 2024-02-06
Attending: PHYSICIAN ASSISTANT
Payer: MEDICARE

## 2024-02-06 DIAGNOSIS — R29.6 FALLS FREQUENTLY: Primary | ICD-10-CM

## 2024-02-06 PROCEDURE — 97112 NEUROMUSCULAR REEDUCATION: CPT | Mod: GP | Performed by: PHYSICAL MEDICINE & REHABILITATION

## 2024-03-07 ENCOUNTER — THERAPY VISIT (OUTPATIENT)
Dept: PHYSICAL THERAPY | Facility: CLINIC | Age: 79
End: 2024-03-07
Attending: PHYSICIAN ASSISTANT
Payer: MEDICARE

## 2024-03-07 DIAGNOSIS — R29.6 FALLS FREQUENTLY: Primary | ICD-10-CM

## 2024-03-07 PROCEDURE — 97112 NEUROMUSCULAR REEDUCATION: CPT | Mod: GP | Performed by: PHYSICAL MEDICINE & REHABILITATION

## 2024-03-07 NOTE — PROGRESS NOTES
"   Discharge Note  03/07/24 0500   Appointment Info   Signing clinician's name / credentials Ashley Aviles, PT, DPT   Total/Authorized Visits 8   Visits Used 5   Medical Diagnosis Falls frequently   PT Tx Diagnosis Impaired balance   Other pertinent information JAMES Victoria Adds Certification   Progress Note/Certification   Start of Care Date 01/11/24   Onset of illness/injury or Date of Surgery 07/11/23  (\"started 6 months ago\")   Therapy Frequency 1x/week   Predicted Duration 8 weeks   Certification date from 01/11/24   Certification date to 03/07/24   Progress Note Due Date 03/07/24   GOALS   PT Goals 2;3;4   PT Goal 1   Goal Identifier 1   Goal Description Pt will be able to perform TUG in less than 14 seconds in order to demonstrate improved gait and improved balance   Target Date 02/08/24   Date Met 03/07/24   PT Goal 2   Goal Identifier 2   Goal Description Pt will be able to demonstrate 30 seconds with first 3 conditions of modified CTSIB, indicating improve balance and decreased fall risk   Target Date 02/22/24   Date Met 03/07/24   PT Goal 3   Goal Identifier 3   Goal Description Pt will be independent with HEP in order to self manage symptoms   Target Date 03/07/24   Date Met 03/07/24   PT Goal 4   Goal Identifier 4   Goal Description Pt will be able to improve FGA by 8 points, indicating improve balance and decreased fall risk   Target Date 03/07/24   Date Met 03/07/24   Subjective Report   Subjective Report Pt reports she has been doing exercises independently at home and feels they are helping.   Objective Measures   Objective Measures Objective Measure 1;Objective Measure 2;Objective Measure 3;Objective Measure 4   Objective Measure 1   Objective Measure TUG (classic)   Details 13 seconds with 4ww (20.95 seconds at initial eval)   Objective Measure 2   Objective Measure mod CTSIB   Details condition 1: 30 seconds (30 seconds at initial eval), condition 2: 30 seconds (30 seconds at initial eval), " "condition 3: 30 seconds (6 seconds at intial eval), condition 4: 4 seconds (0 seconds at initial eval)   Objective Measure 3   Objective Measure FGA   Details 25/30 (12/30 at initial eval)   Treatment Interventions (PT)   Interventions Therapeutic Procedure/Exercise;Neuromuscular Re-education   Neuromuscular Re-education   Neuromuscular re-ed of mvmt, balance, coord, kinesthetic sense, posture, proprioception minutes (70767) 23   Neuromuscular Re-education Neuro Re-ed 2;Neuro Re-ed 3;Neuro Re-ed 4;Neuro Re-ed 5;Neuro Re-ed 6;Neuro Re-ed 7;Neuro Re-ed 8;Neuro Re-ed 9;Neuro Re-ed 10   Neuro Re-ed 1 walking with vertical and horizontal head turns   Neuro Re-ed 1 - Details 10' x2   Neuro Re-ed 2 amb over 3\" obstacles   Neuro Re-ed 2 - Details 10' x3   Neuro Re-ed 3 amb around cone   Neuro Re-ed 3 - Details 10' x2, 20' x1   Neuro Re-ed 4 amb backwards   Neuro Re-ed 4 - Details 10' x1   Neuro Re-ed 5 stairs   Neuro Re-ed 5 - Details 5x1   Neuro Re-ed 6 amb with eyes closed   Neuro Re-ed 6 - Details 10'x1   Neuro Re-ed 7 amb with gait speed changes   Neuro Re-ed 7 - Details 20' x1   Neuro Re-ed 8 trialed tandem walking but pt unable due to difficulty   Neuro Re-ed 8 - Details performed all aspects of mod CTSIB   Neuro Re-ed 9 30 seconds each   Neuro Re-ed 9 - Details HEP review and instructed to cont   Skilled Intervention balance training to improve fall risk   Patient Response/Progress pt demonstrated and verbalized good understanding   Neuro Re-ed 10 x1   Eval/Assessments   Assessments   (Pt has attended 5 PT sessions and has met 4/4 short term and long term goals. Pt's TUG, mod CTSIB and FGA all improved and pt no longer considered fall risk based on standardized tests. Pt is appropriate for D/C as she has met all goals and I with HEP)   Education   Learner/Method Patient;Listening;Reading;Caregiver;Demonstration;Pictures/Video   Education Comments pt and PCA demonstrated and verbalized good understanding   Plan   Home " program [email or web address]   Updates to plan of care D/C from PT   Total Session Time   Timed Code Treatment Minutes 23   Total Treatment Time (sum of timed and untimed services) 23     DISCHARGE  Reason for Discharge: Patient has met all goals.    Equipment Issued: none    Discharge Plan: Patient to continue home program.    Referring Provider:  Zach Nguyen    Please contact me with any questions or concerns.    Thank you for your referral,     Ashley Aviles, PT, DPT  Physical Therapist  [address] [address] [phone]

## 2024-04-17 ENCOUNTER — TELEPHONE (OUTPATIENT)
Dept: FAMILY MEDICINE | Facility: CLINIC | Age: 79
End: 2024-04-17
Payer: MEDICARE

## 2024-04-17 DIAGNOSIS — N39.498 OTHER URINARY INCONTINENCE: Primary | ICD-10-CM

## 2024-04-17 DIAGNOSIS — R39.15 URGENCY OF URINATION: ICD-10-CM

## 2024-04-17 NOTE — TELEPHONE ENCOUNTER
Received fax from [employer]:      Requesting orders for the following incontinence products:    Underwear/ pull ups 150 per month  Underwear/ Chux 60 per month    Please fax RX for products and include refill amounts for 1 year and diagnosis codes    Fax:[phone]

## 2024-06-12 ENCOUNTER — TELEPHONE (OUTPATIENT)
Dept: FAMILY MEDICINE | Facility: CLINIC | Age: 79
End: 2024-06-12
Payer: MEDICARE

## 2024-06-12 NOTE — TELEPHONE ENCOUNTER
Orders signed by Burton Nguyen. Placed at  for . Group home notified.     [facility]  [phone]

## 2024-07-25 ENCOUNTER — MEDICAL CORRESPONDENCE (OUTPATIENT)
Dept: HEALTH INFORMATION MANAGEMENT | Facility: CLINIC | Age: 79
End: 2024-07-25
Payer: MEDICARE

## 2024-09-30 DIAGNOSIS — M25.559 HIP PAIN: ICD-10-CM

## 2024-10-01 RX ORDER — PSEUDOEPHED/ACETAMINOPH/DIPHEN 30MG-500MG
TABLET ORAL
Qty: 90 TABLET | Refills: 0 | Status: SHIPPED | OUTPATIENT
Start: 2024-10-01

## 2024-10-14 ENCOUNTER — OFFICE VISIT (OUTPATIENT)
Dept: FAMILY MEDICINE | Facility: CLINIC | Age: 79
End: 2024-10-14
Payer: MEDICARE

## 2024-10-14 VITALS
HEIGHT: 67 IN | WEIGHT: 167 LBS | OXYGEN SATURATION: 95 % | SYSTOLIC BLOOD PRESSURE: 138 MMHG | BODY MASS INDEX: 26.21 KG/M2 | TEMPERATURE: 97.9 F | HEART RATE: 75 BPM | RESPIRATION RATE: 14 BRPM | DIASTOLIC BLOOD PRESSURE: 82 MMHG

## 2024-10-14 DIAGNOSIS — H91.90 HARD OF HEARING: ICD-10-CM

## 2024-10-14 DIAGNOSIS — F33.0 MILD EPISODE OF RECURRENT MAJOR DEPRESSIVE DISORDER (H): Primary | ICD-10-CM

## 2024-10-14 DIAGNOSIS — Z00.00 ENCOUNTER FOR MEDICARE ANNUAL WELLNESS EXAM: ICD-10-CM

## 2024-10-14 DIAGNOSIS — G40.909 SEIZURE DISORDER (H): ICD-10-CM

## 2024-10-14 DIAGNOSIS — R60.9 EDEMA, UNSPECIFIED TYPE: ICD-10-CM

## 2024-10-14 DIAGNOSIS — E78.5 HYPERLIPIDEMIA LDL GOAL <130: ICD-10-CM

## 2024-10-14 DIAGNOSIS — N39.498 OTHER URINARY INCONTINENCE: ICD-10-CM

## 2024-10-14 DIAGNOSIS — R39.15 URGENCY OF URINATION: ICD-10-CM

## 2024-10-14 DIAGNOSIS — E87.6 HYPOKALEMIA: ICD-10-CM

## 2024-10-14 LAB
ALBUMIN SERPL BCG-MCNC: 4 G/DL (ref 3.5–5.2)
ALP SERPL-CCNC: 66 U/L (ref 40–150)
ALT SERPL W P-5'-P-CCNC: 14 U/L (ref 0–50)
ANION GAP SERPL CALCULATED.3IONS-SCNC: 9 MMOL/L (ref 7–15)
AST SERPL W P-5'-P-CCNC: 22 U/L (ref 0–45)
BILIRUB SERPL-MCNC: 0.2 MG/DL
BUN SERPL-MCNC: 18.9 MG/DL (ref 8–23)
CALCIUM SERPL-MCNC: 9.8 MG/DL (ref 8.8–10.4)
CHLORIDE SERPL-SCNC: 100 MMOL/L (ref 98–107)
CHOLEST SERPL-MCNC: 223 MG/DL
CREAT SERPL-MCNC: 0.77 MG/DL (ref 0.51–0.95)
EGFRCR SERPLBLD CKD-EPI 2021: 78 ML/MIN/1.73M2
ERYTHROCYTE [DISTWIDTH] IN BLOOD BY AUTOMATED COUNT: 11.6 % (ref 10–15)
FASTING STATUS PATIENT QL REPORTED: NO
FASTING STATUS PATIENT QL REPORTED: NO
GLUCOSE SERPL-MCNC: 131 MG/DL (ref 70–99)
HCO3 SERPL-SCNC: 32 MMOL/L (ref 22–29)
HCT VFR BLD AUTO: 47 % (ref 35–47)
HDLC SERPL-MCNC: 63 MG/DL
HGB BLD-MCNC: 15.9 G/DL (ref 11.7–15.7)
LDLC SERPL CALC-MCNC: 92 MG/DL
MCH RBC QN AUTO: 32.5 PG (ref 26.5–33)
MCHC RBC AUTO-ENTMCNC: 33.8 G/DL (ref 31.5–36.5)
MCV RBC AUTO: 96 FL (ref 78–100)
NONHDLC SERPL-MCNC: 160 MG/DL
PLATELET # BLD AUTO: 174 10E3/UL (ref 150–450)
POTASSIUM SERPL-SCNC: 4.4 MMOL/L (ref 3.4–5.3)
PROT SERPL-MCNC: 6.7 G/DL (ref 6.4–8.3)
RBC # BLD AUTO: 4.89 10E6/UL (ref 3.8–5.2)
SODIUM SERPL-SCNC: 141 MMOL/L (ref 135–145)
TRIGL SERPL-MCNC: 339 MG/DL
VALPROATE SERPL-MCNC: 56.4 UG/ML
WBC # BLD AUTO: 5.7 10E3/UL (ref 4–11)

## 2024-10-14 PROCEDURE — 85027 COMPLETE CBC AUTOMATED: CPT | Performed by: PHYSICIAN ASSISTANT

## 2024-10-14 PROCEDURE — 80053 COMPREHEN METABOLIC PANEL: CPT | Performed by: PHYSICIAN ASSISTANT

## 2024-10-14 PROCEDURE — G0439 PPPS, SUBSEQ VISIT: HCPCS | Performed by: PHYSICIAN ASSISTANT

## 2024-10-14 PROCEDURE — 90662 IIV NO PRSV INCREASED AG IM: CPT | Performed by: PHYSICIAN ASSISTANT

## 2024-10-14 PROCEDURE — 91320 SARSCV2 VAC 30MCG TRS-SUC IM: CPT | Performed by: PHYSICIAN ASSISTANT

## 2024-10-14 PROCEDURE — 80156 ASSAY CARBAMAZEPINE TOTAL: CPT | Performed by: PHYSICIAN ASSISTANT

## 2024-10-14 PROCEDURE — 99214 OFFICE O/P EST MOD 30 MIN: CPT | Mod: 25 | Performed by: PHYSICIAN ASSISTANT

## 2024-10-14 PROCEDURE — 36415 COLL VENOUS BLD VENIPUNCTURE: CPT | Performed by: PHYSICIAN ASSISTANT

## 2024-10-14 PROCEDURE — 80164 ASSAY DIPROPYLACETIC ACD TOT: CPT | Performed by: PHYSICIAN ASSISTANT

## 2024-10-14 PROCEDURE — G0008 ADMIN INFLUENZA VIRUS VAC: HCPCS | Performed by: PHYSICIAN ASSISTANT

## 2024-10-14 PROCEDURE — 90480 ADMN SARSCOV2 VAC 1/ONLY CMP: CPT | Performed by: PHYSICIAN ASSISTANT

## 2024-10-14 PROCEDURE — 80061 LIPID PANEL: CPT | Performed by: PHYSICIAN ASSISTANT

## 2024-10-14 RX ORDER — FUROSEMIDE 40 MG/1
40 TABLET ORAL DAILY
Qty: 30 TABLET | Refills: 11 | Status: SHIPPED | OUTPATIENT
Start: 2024-10-14

## 2024-10-14 RX ORDER — FLUOXETINE 10 MG/1
10 TABLET ORAL DAILY
Qty: 30 TABLET | Refills: 11 | Status: SHIPPED | OUTPATIENT
Start: 2024-10-14 | End: 2024-10-17

## 2024-10-14 RX ORDER — POTASSIUM CHLORIDE 1500 MG/1
20 TABLET, EXTENDED RELEASE ORAL DAILY
Qty: 30 TABLET | Refills: 11 | Status: SHIPPED | OUTPATIENT
Start: 2024-10-14

## 2024-10-14 RX ORDER — DIVALPROEX SODIUM 500 MG/1
500 TABLET, DELAYED RELEASE ORAL 3 TIMES DAILY
Qty: 90 TABLET | Refills: 11 | Status: SHIPPED | OUTPATIENT
Start: 2024-10-14

## 2024-10-14 RX ORDER — FLUOXETINE 20 MG/1
20 TABLET ORAL DAILY
Qty: 30 TABLET | Refills: 11 | Status: SHIPPED | OUTPATIENT
Start: 2024-10-14 | End: 2024-10-17

## 2024-10-14 RX ORDER — SIMVASTATIN 20 MG
TABLET ORAL
Qty: 30 TABLET | Refills: 11 | Status: SHIPPED | OUTPATIENT
Start: 2024-10-14

## 2024-10-14 RX ORDER — CARBAMAZEPINE 200 MG/1
TABLET ORAL
Qty: 75 TABLET | Refills: 11 | Status: SHIPPED | OUTPATIENT
Start: 2024-10-14

## 2024-10-14 SDOH — HEALTH STABILITY: PHYSICAL HEALTH: ON AVERAGE, HOW MANY DAYS PER WEEK DO YOU ENGAGE IN MODERATE TO STRENUOUS EXERCISE (LIKE A BRISK WALK)?: 7 DAYS

## 2024-10-14 SDOH — HEALTH STABILITY: PHYSICAL HEALTH: ON AVERAGE, HOW MANY MINUTES DO YOU ENGAGE IN EXERCISE AT THIS LEVEL?: 20 MIN

## 2024-10-14 ASSESSMENT — PAIN SCALES - GENERAL: PAINLEVEL: NO PAIN (0)

## 2024-10-14 ASSESSMENT — SOCIAL DETERMINANTS OF HEALTH (SDOH): HOW OFTEN DO YOU GET TOGETHER WITH FRIENDS OR RELATIVES?: THREE TIMES A WEEK

## 2024-10-14 NOTE — PROGRESS NOTES
Preventive Care Visit  [facility]  Zach Nguyen PA-C, Family Medicine  Oct 14, 2024      Assessment & Plan   Mild episode of recurrent major depressive disorder (H)  Yearly screening is wnl. Continue current regimen as prescribed.   - FLUoxetine HCl, PMDD, 10 MG TABS; Take 10 mg by mouth daily.  - FLUoxetine HCl, PMDD, 20 MG TABS; Take 20 mg by mouth daily.    Seizure disorder (H)  Stable without recent seizure activity. No obvious side effects from medications. Recheck drug levels, CBC and CMP. Follow-up in 1 year for recheck.   - carBAMazepine (TEGRETOL) 200 MG tablet; Take 1 tablet (200 mg) by mouth every morning AND 1.5 tablets (300 mg) every evening. 200 mg in Am and 300 mg in the PM.  - divalproex sodium delayed-release (DEPAKOTE) 500 MG DR tablet; Take 1 tablet (500 mg) by mouth 3 times daily.  - CBC with platelets; Future  - Comprehensive metabolic panel; Future  - Valproic acid; Future  - Carbamazepine total; Future    Other urinary incontinence  Urgency of urination  Does not use bladder stimulator and would like to discuss removal. Referred back to Urology.   - Adult Urology  Referral; Future    Hyperlipidemia LDL goal <130  Due for recheck and refill.   - Lipid panel reflex to direct LDL Non-fasting; Future  - simvastatin (ZOCOR) 20 MG tablet; TAKE 1 TABLET BY MOUTH AT BEDTIME    Hypokalemia  Edema, unspecified type  Stable. Continue medications as prescribed. Recheck CMP.   - potassium chloride conrad ER (KLOR-CON M20) 20 MEQ CR tablet; Take 1 tablet (20 mEq) by mouth daily.  - furosemide (LASIX) 40 MG tablet; Take 1 tablet (40 mg) by mouth daily.    Encounter for Medicare annual wellness exam  79 yr old here for Medicare Wellness exam. Last MWE done 1 year(s) ago. Discussed preventative screenings which are updated below. Counseled on immunizations and healthy lifestyle, including ongoing exercise. Continues home PT exercises. Follow-up in 1 year for repeat  "physical exam.     Hard of hearing  Referral placed.   - Adult Audiology  Referral; Future    Patient has been advised of split billing requirements and indicates understanding: Yes        BMI  Estimated body mass index is 26.55 kg/m  as calculated from the following:    Height as of this encounter: 1.689 m (5' 6.5\").    Weight as of this encounter: 75.8 kg (167 lb).     Counseling  Appropriate preventive services were addressed with this patient via screening, questionnaire, or discussion as appropriate for fall prevention, nutrition, physical activity, Tobacco-use cessation, social engagement, weight loss and cognition.  Checklist reviewing preventive services available has been given to the patient.  Reviewed patient's diet, addressing concerns and/or questions.   Updated plan of care.  Patient reported difficulty with activities of daily living were addressed today.The patient was provided with written information regarding signs of hearing loss.   Information on urinary incontinence and treatment options given to patient.     Paola Baron is a 79 year old, presenting for the following:  Wellness Visit        10/14/2024     1:11 PM   Additional Questions   Roomed by Paula DODD   Accompanied by Paola -  Provide care         10/14/2024     1:11 PM   Patient Reported Additional Medications   Patient reports taking the following new medications .         Health Care Directive  Patient does not have a Health Care Directive or Living Will: Discussed advance care planning with patient; information given to patient to review.    HPI  Hyperlipidemia Follow-Up  Are you regularly taking any medication or supplement to lower your cholesterol?   Yes- zocor  Are you having muscle aches or other side effects that you think could be caused by your cholesterol lowering medication?  No      10/14/2024   General Health   How would you rate your overall physical health? Good   Feel stress (tense, anxious, " or unable to sleep) Not at all            10/14/2024   Nutrition   Diet: Low salt            10/14/2024   Exercise   Days per week of moderate/strenous exercise 7 days   Average minutes spent exercising at this level 20 min            10/14/2024   Social Factors   Frequency of gathering with friends or relatives Three times a week   Worry food won't last until get money to buy more No   Food not last or not have enough money for food? No   Do you have housing? (Housing is defined as stable permanent housing and does not include staying ouside in a car, in a tent, in an abandoned building, in an overnight shelter, or couch-surfing.) Yes   Are you worried about losing your housing? No   Lack of transportation? No   Unable to get utilities (heat,electricity)? No            10/14/2024   Fall Risk   Fallen 2 or more times in the past year? No   Trouble with walking or balance? Yes   Gait Speed Test (Document in seconds) 7   Gait Speed Test Interpretation Greater than 5.01 seconds - ABNORMAL             10/14/2024   Activities of Daily Living- Home Safety   Needs help with the following daily activites Transportation    Shopping    Bathing    Medication administration    Money management   Safety concerns in the home None of the above       Multiple values from one day are sorted in reverse-chronological order         10/14/2024   Dental   Dentist two times every year? Yes            10/14/2024   Hearing Screening   Hearing concerns? (!) I FEEL THAT PEOPLE ARE MUMBLING OR NOT SPEAKING CLEARLY.    (!) IT'S HARD TO FOLLOW A CONVERSATION IN A NOISY RESTAURANT OR CROWDED ROOM.    (!) TROUBLE UNDERSTANDING SOFT OR WHISPERED SPEECH.       Multiple values from one day are sorted in reverse-chronological order         10/14/2024   Driving Risk Screening   Patient/family members have concerns about driving (!) DECLINE            10/14/2024   General Alertness/Fatigue Screening   Have you been more tired than usual lately? No             10/14/2024   Urinary Incontinence Screening   Bothered by leaking urine in past 6 months Yes            10/14/2024   TB Screening   Were you born outside of the [place]? No          Today's PHQ-9 Score:       10/14/2024     1:05 PM   PHQ-9 SCORE   PHQ-9 Total Score MyChart 0   PHQ-9 Total Score 0         10/14/2024   Substance Use   Alcohol more than 3/day or more than 7/wk Not Applicable   Do you have a current opioid prescription? No   How severe/bad is pain from 1 to 10? 0/10 (No Pain)   Do you use any other substances recreationally? No        Social History     Tobacco Use    Smoking status: Never    Smokeless tobacco: Never   Vaping Use    Vaping status: Never Used   Substance Use Topics    Alcohol use: No    Drug use: No                Reviewed and updated as needed this visit by Provider   Tobacco  Allergies  Meds  Problems  Med Hx  Surg Hx  Fam Hx          Current providers sharing in care for this patient include:  Patient Care Team:  Zach Nguyen PA-C as PCP - General (Family Medicine)  Zach Nguyen PA-C as Assigned PCP    The following health maintenance items are reviewed in Epic and correct as of today:  Health Maintenance   Topic Date Due    ZOSTER IMMUNIZATION (1 of 2) Never done    RSV VACCINE (1 - 1-dose 75+ series) Never done    LIPID  06/24/2022    DTAP/TDAP/TD IMMUNIZATION (3 - Td or Tdap) 03/26/2023    INFLUENZA VACCINE (1) 09/01/2024    COVID-19 Vaccine (5 - 2024-25 season) 09/01/2024    PHQ-9  04/14/2025    MEDICARE ANNUAL WELLNESS VISIT  10/14/2025    ANNUAL REVIEW OF HM ORDERS  10/14/2025    FALL RISK ASSESSMENT  10/14/2025    GLUCOSE  10/09/2026    ADVANCE CARE PLANNING  10/14/2029    DEXA  12/08/2037    DEPRESSION ACTION PLAN  Completed    Pneumococcal Vaccine: 65+ Years  Completed    HPV IMMUNIZATION  Aged Out    MENINGITIS IMMUNIZATION  Aged Out    RSV MONOCLONAL ANTIBODY  Aged Out    MAMMO SCREENING  Discontinued    COLORECTAL CANCER SCREENING  Discontinued  "        Review of Systems  See HPI      Objective    Exam  /82   Pulse 75   Temp 97.9  F (36.6  C) (Tympanic)   Resp 14   Ht 1.689 m (5' 6.5\")   Wt 75.8 kg (167 lb)   LMP 04/02/2004 (Approximate)   SpO2 95%   BMI 26.55 kg/m     Estimated body mass index is 26.55 kg/m  as calculated from the following:    Height as of this encounter: 1.689 m (5' 6.5\").    Weight as of this encounter: 75.8 kg (167 lb).    Physical Exam  GENERAL: alert and no distress  NECK: no adenopathy, no asymmetry, masses, or scars  RESP: lungs clear to auscultation - no rales, rhonchi or wheezes  CV: regular rate and rhythm, normal S1 S2, no S3 or S4, no murmur, click or rub, no peripheral edema  MS: no gross musculoskeletal defects noted, no edema        10/14/2024   Mini Cog   Mini-Cog Not Completed (choose reason) Mental handicap                 Signed Electronically by: Zach Nguyen PA-C    "

## 2024-10-14 NOTE — PATIENT INSTRUCTIONS
Patient Education   Preventive Care Advice   This is general advice given by our system to help you stay healthy. However, your care team may have specific advice just for you. Please talk to your care team about your preventive care needs.  Nutrition  Eat 5 or more servings of fruits and vegetables each day.  Try wheat bread, brown rice and whole grain pasta (instead of white bread, rice, and pasta).  Get enough calcium and vitamin D. Check the label on foods and aim for 100% of the RDA (recommended daily allowance).  Lifestyle  Exercise at least 150 minutes each week  (30 minutes a day, 5 days a week).  Do muscle strengthening activities 2 days a week. These help control your weight and prevent disease.  No smoking.  Wear sunscreen to prevent skin cancer.  Have a dental exam and cleaning every 6 months.  Yearly exams  See your health care team every year to talk about:  Any changes in your health.  Any medicines your care team has prescribed.  Preventive care, family planning, and ways to prevent chronic diseases.  Shots (vaccines)   HPV shots (up to age 26), if you've never had them before.  Hepatitis B shots (up to age 59), if you've never had them before.  COVID-19 shot: Get this shot when it's due.  Flu shot: Get a flu shot every year.  Tetanus shot: Get a tetanus shot every 10 years.  Pneumococcal, hepatitis A, and RSV shots: Ask your care team if you need these based on your risk.  Shingles shot (for age 50 and up)  General health tests  Diabetes screening:  Starting at age 35, Get screened for diabetes at least every 3 years.  If you are younger than age 35, ask your care team if you should be screened for diabetes.  Cholesterol test: At age 39, start having a cholesterol test every 5 years, or more often if advised.  Bone density scan (DEXA): At age 50, ask your care team if you should have this scan for osteoporosis (brittle bones).  Hepatitis C: Get tested at least once in your life.  STIs (sexually  transmitted infections)  Before age 24: Ask your care team if you should be screened for STIs.  After age 24: Get screened for STIs if you're at risk. You are at risk for STIs (including HIV) if:  You are sexually active with more than one person.  You don't use condoms every time.  You or a partner was diagnosed with a sexually transmitted infection.  If you are at risk for HIV, ask about PrEP medicine to prevent HIV.  Get tested for HIV at least once in your life, whether you are at risk for HIV or not.  Cancer screening tests  Cervical cancer screening: If you have a cervix, begin getting regular cervical cancer screening tests starting at age 21.  Breast cancer scan (mammogram): If you've ever had breasts, begin having regular mammograms starting at age 40. This is a scan to check for breast cancer.  Colon cancer screening: It is important to start screening for colon cancer at age 45.  Have a colonoscopy test every 10 years (or more often if you're at risk) Or, ask your provider about stool tests like a FIT test every year or Cologuard test every 3 years.  To learn more about your testing options, visit:   .  For help making a decision, visit:   https://bit.ly/rj98689.  Prostate cancer screening test: If you have a prostate, ask your care team if a prostate cancer screening test (PSA) at age 55 is right for you.  Lung cancer screening: If you are a current or former smoker ages 50 to 80, ask your care team if ongoing lung cancer screenings are right for you.  For informational purposes only. Not to replace the advice of your health care provider. Copyright   2023 [facility]. All rights reserved. Clinically reviewed by the [facility]. [employer] 446703 - REV 01/24.  Learning About Activities of Daily Living  What are activities of daily living?     Activities of daily living (ADLs) are the basic self-care tasks you do every day. These include eating, bathing, dressing,  and moving around.  As you age, and if you have health problems, you may find that it's harder to do some of these tasks. If so, your doctor can suggest ideas that may help.  To measure what kind of help you may need, your doctor will ask how well you are able to do ADLs. Let your doctor know if there are any tasks that you are having trouble doing. This is an important first step to getting help. And when you have the help you need, you can stay as independent as possible.  How will a doctor assess your ADLs?  Asking about ADLs is part of a routine health checkup your doctor will likely do as you age. Your health check might be done in a doctor's office, in your home, or at a hospital. The goal is to find out if you are having any problems that could make it hard to care for yourself or that make it unsafe for you to be on your own.  To measure your ADLs, your doctor will ask how hard it is for you to do routine tasks. Your doctor may also want to know if you have changed the way you do a task because of a health problem. Your doctor may watch how you:  Walk back and forth.  Keep your balance while you stand or walk.  Move from sitting to standing or from a bed to a chair.  Button or unbutton a shirt or sweater.  Remove and put on your shoes.  It's common to feel a little worried or anxious if you find you can't do all the things you used to be able to do. Talking with your doctor about ADLs is a way to make sure you're as safe as possible and able to care for yourself as well as you can. You may want to bring a caregiver, friend, or family member to your checkup. They can help you talk to your doctor.  Follow-up care is a key part of your treatment and safety. Be sure to make and go to all appointments, and call your doctor if you are having problems. It's also a good idea to know your test results and keep a list of the medicines you take.  Current as of: October 24, 2023  Content Version: 14.2 2024 [facility]  [place].   Care instructions adapted under license by your healthcare professional. If you have questions about a medical condition or this instruction, always ask your healthcare professional. [employer] disclaims any warranty or liability for your use of this information.    Preventing Falls: Care Instructions  Injuries and health problems such as trouble walking or poor eyesight can increase your risk of falling. So can some medicines. But there are things you can do to help prevent falls. You can exercise to get stronger. You can also arrange your home to make it safer.    Talk to your doctor about the medicines you take. Ask if any of them increase the risk of falls and whether they can be changed or stopped.   Try to exercise regularly. It can help improve your strength and balance. This can help lower your risk of falling.         Practice fall safety and prevention.   Wear low-heeled shoes that fit well and give your feet good support. Talk to your doctor if you have foot problems that make this hard.  Carry a cellphone or wear a medical alert device that you can use to call for help.  Use stepladders instead of chairs to reach high objects. Don't climb if you're at risk for falls. Ask for help, if needed.  Wear the correct eyeglasses, if you need them.        Make your home safer.   Remove rugs, cords, clutter, and furniture from walkways.  Keep your house well lit. Use night-lights in hallways and bathrooms.  Install and use sturdy handrails on stairways.  Wear nonskid footwear, even inside. Don't walk barefoot or in socks without shoes.        Be safe outside.   Use handrails, curb cuts, and ramps whenever possible.  Keep your hands free by using a shoulder bag or backpack.  Try to walk in well-lit areas. Watch out for uneven ground, changes in pavement, and debris.  Be careful in the winter. Walk on the grass or gravel when sidewalks are slippery. Use de-icer on steps and walkways.  "Add non-slip devices to shoes.    Put grab bars and nonskid mats in your shower or tub and near the toilet. Try to use a shower chair or bath bench when bathing.   Get into a tub or shower by putting in your weaker leg first. Get out with your strong side first. Have a phone or medical alert device in the bathroom with you.   Where can you learn more?  Go to https://www.[employer].net/patiented  Enter G117 in the search box to learn more about \"Preventing Falls: Care Instructions.\"  Current as of: July 17, 2023  Content Version: 14.2 2024 [employer].   Care instructions adapted under license by your healthcare professional. If you have questions about a medical condition or this instruction, always ask your healthcare professional. Health[employer] disclaims any warranty or liability for your use of this information.    Hearing Loss: Care Instructions  Overview     Hearing loss is a sudden or slow decrease in how well you hear. It can range from slight to profound. Permanent hearing loss can occur with aging. It also can happen when you are exposed long-term to loud noise. Examples include listening to loud music, riding motorcycles, or being around other loud machines.  Hearing loss can affect your work and home life. It can make you feel lonely or depressed. You may feel that you have lost your independence. But hearing aids and other devices can help you hear better and feel connected to others.  Follow-up care is a key part of your treatment and safety. Be sure to make and go to all appointments, and call your doctor if you are having problems. It's also a good idea to know your test results and keep a list of the medicines you take.  How can you care for yourself at home?  Avoid loud noises whenever possible. This helps keep your hearing from getting worse.  Always wear hearing protection around loud noises.  Wear a hearing aid as directed.  A professional can help you pick a hearing aid " "that will work best for you.  You can also get hearing aids over the counter for mild to moderate hearing loss.  Have hearing tests as your doctor suggests. They can show whether your hearing has changed. Your hearing aid may need to be adjusted.  Use other devices as needed. These may include:  Telephone amplifiers and hearing aids that can connect to a television, stereo, radio, or microphone.  Devices that use lights or vibrations. These alert you to the doorbell, a ringing telephone, or a baby monitor.  Television closed-captioning. This shows the words at the bottom of the screen. Most new TVs can do this.  TTY (text telephone). This lets you type messages back and forth on the telephone instead of talking or listening. These devices are also called TDD. When messages are typed on the keyboard, they are sent over the phone line to a receiving TTY. The message is shown on a monitor.  Use text messaging, social media, and email if it is hard for you to communicate by telephone.  Try to learn a listening technique called speechreading. It is not lipreading. You pay attention to people's gestures, expressions, posture, and tone of voice. These clues can help you understand what a person is saying. Face the person you are talking to, and have them face you. Make sure the lighting is good. You need to see the other person's face clearly.  Think about counseling if you need help to adjust to your hearing loss.  When should you call for help?  Watch closely for changes in your health, and be sure to contact your doctor if:    You think your hearing is getting worse.     You have new symptoms, such as dizziness or nausea.   Where can you learn more?  Go to https://www.[employer]Datas[employer].io.net/patiented  Enter R798 in the search box to learn more about \"Hearing Loss: Care Instructions.\"  Current as of: September 27, 2023  Content Version: 14.2 2024 [employer].   Care instructions adapted under license by your " healthcare professional. If you have questions about a medical condition or this instruction, always ask your healthcare professional. Healthwise, [facility] disclaims any warranty or liability for your use of this information.    Bladder Training: Care Instructions  Your Care Instructions     Bladder training is used to treat urge incontinence and stress incontinence. Urge incontinence means that the need to urinate comes on so fast that you can't get to a toilet in time. Stress incontinence means that you leak urine because of pressure on your bladder. For example, it may happen when you laugh, cough, or lift something heavy.  Bladder training can increase how long you can wait before you have to urinate. It can also help your bladder hold more urine. And it can give you better control over the urge to urinate.  It is important to remember that bladder training takes a few weeks to a few months to make a difference. You may not see results right away, but don't give up.  Follow-up care is a key part of your treatment and safety. Be sure to make and go to all appointments, and call your doctor if you are having problems. It's also a good idea to know your test results and keep a list of the medicines you take.  How can you care for yourself at home?  Work with your doctor to come up with a bladder training program that is right for you. You may use one or more of the following methods.  Delayed urination  In the beginning, try to keep from urinating for 5 minutes after you first feel the need to go.  While you wait, take deep, slow breaths to relax. Kegel exercises can also help you delay the need to go to the bathroom.  After some practice, when you can easily wait 5 minutes to urinate, try to wait 10 minutes before you urinate.  Slowly increase the waiting period until you are able to control when you have to urinate.  Scheduled urination  Empty your bladder when you first wake up in the morning.  Schedule  "times throughout the day when you will urinate.  Start by going to the bathroom every hour, even if you don't need to go.  Slowly increase the time between trips to the bathroom.  When you have found a schedule that works well for you, keep doing it.  If you wake up during the night and have to urinate, do it. Apply your schedule to waking hours only.  Kegel exercises  These tighten and strengthen pelvic muscles, which can help you control the flow of urine. (If doing these exercises causes pain, stop doing them and talk with your doctor.) To do Kegel exercises:  Squeeze your muscles as if you were trying not to pass gas. Or squeeze your muscles as if you were stopping the flow of urine. Your belly, legs, and buttocks shouldn't move.  Hold the squeeze for 3 seconds, then relax for 5 to 10 seconds.  Start with 3 seconds, then add 1 second each week until you are able to squeeze for 10 seconds.  Repeat the exercise 10 times a session. Do 3 to 8 sessions a day.  When should you call for help?  Watch closely for changes in your health, and be sure to contact your doctor if:    Your incontinence is getting worse.     You do not get better as expected.   Where can you learn more?  Go to https://www.[employer].net/patiented  Enter V684 in the search box to learn more about \"Bladder Training: Care Instructions.\"  Current as of: November 15, 2023  Content Version: 14.2 2024 [employer].   Care instructions adapted under license by your healthcare professional. If you have questions about a medical condition or this instruction, always ask your healthcare professional. Healthwise, Incorporated disclaims any warranty or liability for your use of this information.       "

## 2024-10-14 NOTE — NURSING NOTE
"Chief Complaint   Patient presents with    Wellness Visit     /82   Pulse 75   Temp 97.9  F (36.6  C) (Tympanic)   Resp 14   Ht 1.689 m (5' 6.5\")   Wt 75.8 kg (167 lb)   LMP 04/02/2004 (Approximate)   SpO2 95%   BMI 26.55 kg/m   Estimated body mass index is 26.55 kg/m  as calculated from the following:    Height as of this encounter: 1.689 m (5' 6.5\").    Weight as of this encounter: 75.8 kg (167 lb).  Patient presents to the clinic using [employer] that is potentially due pending provider review:    Health Maintenance Due   Topic Date Due    ZOSTER IMMUNIZATION (1 of 2) Never done    RSV VACCINE (1 - 1-dose 75+ series) Never done    LIPID  06/24/2022    DTAP/TDAP/TD IMMUNIZATION (3 - Td or Tdap) 03/26/2023    INFLUENZA VACCINE (1) 09/01/2024    COVID-19 Vaccine (5 - 2024-25 season) 09/01/2024    ANNUAL REVIEW OF HM ORDERS  10/09/2024    MEDICARE ANNUAL WELLNESS VISIT  10/09/2024        Flu and covid today           "

## 2024-10-15 LAB — CARBAMAZEPINE SERPL-MCNC: 7 UG/ML (ref 4–12)

## 2024-10-16 DIAGNOSIS — F33.0 MILD EPISODE OF RECURRENT MAJOR DEPRESSIVE DISORDER (H): ICD-10-CM

## 2024-10-16 DIAGNOSIS — G40.909 SEIZURE DISORDER (H): ICD-10-CM

## 2024-10-16 RX ORDER — FLUOXETINE 10 MG/1
10 TABLET ORAL DAILY
Qty: 30 TABLET | Refills: 11 | Status: CANCELLED | OUTPATIENT
Start: 2024-10-16

## 2024-10-16 RX ORDER — FLUOXETINE 20 MG/1
20 TABLET ORAL DAILY
Qty: 30 TABLET | Refills: 11 | Status: CANCELLED | OUTPATIENT
Start: 2024-10-16

## 2024-10-17 RX ORDER — FLUOXETINE 10 MG/1
10 CAPSULE ORAL DAILY
Qty: 30 CAPSULE | Refills: 11 | Status: SHIPPED | OUTPATIENT
Start: 2024-10-17

## 2024-11-04 ENCOUNTER — NURSE TRIAGE (OUTPATIENT)
Dept: FAMILY MEDICINE | Facility: CLINIC | Age: 79
End: 2024-11-04
Payer: MEDICARE

## 2024-11-04 NOTE — TELEPHONE ENCOUNTER
I am not sure what to do if she has not sustained any injuries from the falls. If she falls one more time, I would recommend an appointment in clinic to evaluate why this is happening.     Zach Nguyen PA-C

## 2024-11-04 NOTE — TELEPHONE ENCOUNTER
[facility] home staff Mila returned call. She has been asking Loraine every 5-10 minutes if she is ok and patient denies any issues. No pain or bruising  noted. She is eating and drinking normal and up moving around. Mila watched her walk per usual. Patient made her bed without problems witness by Mila.  Writer questioned if Loraine is having any urinary issues such as frequency, urgency, burning, cloudy, darker than normal or foul smelling urine. While on the phone, Mila asked Loraine these questions and she does admit to pain with urination and bilateral low back pain.No fever. Scheduled to see PCP tomorrow morning. Advised patient should be seen sooner if urinary symptoms or back pain worsens, new confusion, more falls or anything else concerning. Mila verbalized understanding. Urgent care hours provided.   Rosaura HAYES RN     [No Acute Distress] : no acute distress [Well Nourished] : well nourished [Normal Voice/Communication] : normal voice/communication [Well Developed] : well developed [Well-Appearing] : well-appearing [PERRL] : pupils equal round and reactive to light [Normal Sclera/Conjunctiva] : normal sclera/conjunctiva [Normal Outer Ear/Nose] : the outer ears and nose were normal in appearance [EOMI] : extraocular movements intact [No JVD] : no jugular venous distention [Normal TMs] : both tympanic membranes were normal [Supple] : supple [No Respiratory Distress] : no respiratory distress  [No Accessory Muscle Use] : no accessory muscle use [Normal Rate] : normal rate  [Clear to Auscultation] : lungs were clear to auscultation bilaterally [No Edema] : there was no peripheral edema [Regular Rhythm] : with a regular rhythm [Normal S1, S2] : normal S1 and S2 [Soft] : abdomen soft [No Extremity Clubbing/Cyanosis] : no extremity clubbing/cyanosis [Non Tender] : non-tender [Normal Bowel Sounds] : normal bowel sounds [No CVA Tenderness] : no CVA  tenderness [No Spinal Tenderness] : no spinal tenderness [No Rash] : no rash [No Focal Deficits] : no focal deficits [Speech Grossly Normal] : speech grossly normal [Normal Gait] : normal gait [Normal Affect] : the affect was normal [Alert and Oriented x3] : oriented to person, place, and time [de-identified] : No ST; wearing full face mask [de-identified] : R=16; good air entry; no wheezing; no cough noted [de-identified] : No stridor [de-identified] : no cords

## 2024-11-04 NOTE — TELEPHONE ENCOUNTER
Mila, [facility], [facility], [phone], called reporting that pt has fallen twice in the past 24 hours.    Summer says that yesterday pt has a witnessed fall during lunch.  She was walking in the lunch room and appeared to have tripped.  There was no apparent injury per Summer.    Today pt was found sitting on her buttocks in her room.  Pt reports that she fell again this morning telling staff that she tripped over her feet this morning.      Pt sustained no apparent injury per staff after they looked her over.  Denies neurological changes.  Denies cuts or bruises.  Pt denies hitting her head.  BP and other vital signs are reported as per usual and normal.    Routed to PCP for further instructions since this is the second fall within 24 hours.    Pt has history of falls.    Angeles Suarez RN

## 2024-11-04 NOTE — TELEPHONE ENCOUNTER
Reason for Disposition   Falling (two or more falls) and in past year    Additional Information   Negative: Major injury from dangerous force (e.g., fall > 10 feet or 3 meters)   Negative: Major bleeding (e.g., actively dripping or spurting) and can't be stopped   Negative: Shock suspected (e.g., cold/pale/clammy skin, too weak to stand)   Negative: Difficult to awaken or acting confused (e.g., disoriented, slurred speech)   Negative: SEVERE weakness (i.e., unable to walk or barely able to walk, requires support) and new-onset or worsening   Negative: Can't stand (bear weight) or walk and new-onset after fall   Negative: Sounds like a life-threatening emergency to the triager   Negative: Fainted (passed out)   Negative: New-onset or worsening weakness of the face, arm or leg on one side of the body   Negative: New-onset or worsening dizziness and described as spinning or off balance (i.e., vertigo)   Negative: New-onset or worsening dizziness and NO spinning sensation or trouble with balance   Negative: Pregnant and fall   Negative: Patient has a concerning injury to a specific part of the body (e.g., chest, leg, head)   Negative: Patient has a wound (abrasion, cut, puncture, other skin injury or tear)   Negative: Injury (or injuries) that need emergency care   Negative: Sounds like a serious injury to the triager   Negative: Muscle pain and dark (cola colored) or red-colored urine   Negative: Unable to get up until help (e.g., caregiver, family, friend) arrived and on the ground 1 hour or more   Negative: Patient sounds very sick or weak to the triager   Negative: MODERATE weakness (i.e., interferes with work, school, normal activities) and new-onset or worsening   Negative: Fever > 101 F (38.3 C) and age > 60 years   Negative: Fever > 100.0 F (37.8 C) and bedridden (e.g., CVA, chronic illness, recovering from surgery)   Negative: Fever > 100.0 F (37.8 C) and diabetes mellitus or weak immune system (e.g., HIV  "positive, cancer chemo, splenectomy, organ transplant, chronic steroids)   Negative: Suspicious history for the fall   Negative: Caller has URGENT question and triager unable to answer question   Negative: New-onset or worsening pale skin (pallor)   Negative: No prior tetanus shots (or is not fully vaccinated) and any wound (e.g., cut or scrape)   Negative: HIV positive or severe immunodeficiency (severely weak immune system) and DIRTY cut or scrape   Negative: Caller has NON-URGENT question and triager unable to answer question   Negative: MILD weakness (i.e., does not interfere with ability to work, go to school, normal activities)  (Exception: mild weakness is a chronic symptom.)   Negative: Last tetanus shot > 5 years ago and DIRTY cut or scrape   Negative: Last tetanus shot > 10 years ago and CLEAN cut or scrape (e.g., object and skin were clean)   Negative: Patient wants to be seen   Negative: Fall and went to emergency department for evaluation or treatment   Negative: Fall and patient seems very anxious and fearful of falling again    Answer Assessment - Initial Assessment Questions  1. MECHANISM: \"How did the fall happen?\"      Pt has fallen twice in the last 24 hours  2. DOMESTIC VIOLENCE AND ELDER ABUSE SCREENING: \"Did you fall because someone pushed you or tried to hurt you?\" If Yes, ask: \"Are you safe now?\"      denies  3. ONSET: \"When did the fall happen?\" (e.g., minutes, hours, or days ago)      Tripped reported by group home staff  4. LOCATION: \"What part of the body hit the ground?\" (e.g., back, buttocks, head, hips, knees, hands, head, stomach)      Buttocks for one  of the falls.  5. INJURY: \"Did you hurt (injure) yourself when you fell?\" If Yes, ask: \"What did you injure? Tell me more about this?\" (e.g., body area; type of injury; pain severity)\"      Denies apparent injury  6. PAIN: \"Is there any pain?\" If Yes, ask: \"How bad is the pain?\" (e.g., Scale 1-10; or mild,   moderate, severe)    - NONE " "(0): No pain    - MILD (1-3): Doesn't interfere with normal activities     - MODERATE (4-7): Interferes with normal activities or awakens from sleep     - SEVERE (8-10): Excruciating pain, unable to do any normal activities       denies  7. SIZE: For cuts, bruises, or swelling, ask: \"How large is it?\" (e.g., inches or centimeters)       denies  8. PREGNANCY: \"Is there any chance you are pregnant?\" \"When was your last menstrual period?\"         9. OTHER SYMPTOMS: \"Do you have any other symptoms?\" (e.g., dizziness, fever, weakness; new onset or worsening).       History of falls of approx once a month  10. CAUSE: \"What do you think caused the fall (or falling)?\" (e.g., tripped, dizzy spell)        Tripped, reported by staff    Protocols used: Falls and [id number]    "

## 2024-11-05 ENCOUNTER — OFFICE VISIT (OUTPATIENT)
Dept: FAMILY MEDICINE | Facility: CLINIC | Age: 79
End: 2024-11-05
Payer: MEDICARE

## 2024-11-05 VITALS
OXYGEN SATURATION: 94 % | BODY MASS INDEX: 26.21 KG/M2 | DIASTOLIC BLOOD PRESSURE: 78 MMHG | HEIGHT: 67 IN | HEART RATE: 71 BPM | TEMPERATURE: 97.7 F | WEIGHT: 167 LBS | RESPIRATION RATE: 22 BRPM | SYSTOLIC BLOOD PRESSURE: 118 MMHG

## 2024-11-05 DIAGNOSIS — G40.909 SEIZURE DISORDER (H): ICD-10-CM

## 2024-11-05 DIAGNOSIS — F33.0 MILD EPISODE OF RECURRENT MAJOR DEPRESSIVE DISORDER (H): ICD-10-CM

## 2024-11-05 DIAGNOSIS — R29.6 RECURRENT FALLS: Primary | ICD-10-CM

## 2024-11-05 LAB
ALBUMIN UR-MCNC: NEGATIVE MG/DL
APPEARANCE UR: CLEAR
BILIRUB UR QL STRIP: NEGATIVE
COLOR UR AUTO: YELLOW
GLUCOSE UR STRIP-MCNC: NEGATIVE MG/DL
HGB UR QL STRIP: NEGATIVE
KETONES UR STRIP-MCNC: NEGATIVE MG/DL
LEUKOCYTE ESTERASE UR QL STRIP: NEGATIVE
NITRATE UR QL: NEGATIVE
PH UR STRIP: 6.5 [PH] (ref 5–7)
SP GR UR STRIP: 1.02 (ref 1–1.03)
UROBILINOGEN UR STRIP-ACNC: 0.2 E.U./DL

## 2024-11-05 PROCEDURE — 81003 URINALYSIS AUTO W/O SCOPE: CPT | Performed by: PHYSICIAN ASSISTANT

## 2024-11-05 PROCEDURE — 99213 OFFICE O/P EST LOW 20 MIN: CPT | Performed by: PHYSICIAN ASSISTANT

## 2024-11-05 ASSESSMENT — PAIN SCALES - GENERAL: PAINLEVEL_OUTOF10: NO PAIN (0)

## 2024-11-05 NOTE — PROGRESS NOTES
"  Assessment & Plan   Recurrent falls  Two falls in the last few days. One was mechanical and witnessed, the other was not. No residual effects or symptoms from the falls. No changes in behaviors, or other noticeable symptoms from home staff. UA was unremarkable. Vitals and exam today are reassuring. Will monitor for now. Warning signs and symptoms discussed on when to return to clinic or go to the ER. Pt verbalized understanding.    - UA Macroscopic with reflex to Microscopic and Culture - Clinic Collect    Mild episode of recurrent major depressive disorder (H)  Stable on current regimen. Follow-up in 6 months for recheck.     Seizure disorder (H)  Stable on current regimen. Recheck levels checked and wnl. Follow-up in 1 year.     BMI  Estimated body mass index is 26.55 kg/m  as calculated from the following:    Height as of this encounter: 1.689 m (5' 6.5\").    Weight as of this encounter: 75.8 kg (167 lb).     Subjective   Loraine is a 79 year old, presenting for the following health issues:  Fall and UTI        11/5/2024     9:26 AM   Additional Questions   Roomed by Ariela GREEN CMA   Accompanied by StaffPaola     History of Present Illness       Reason for visit:  Falls x 2 within the last week (recently got new glasses so not sure if it is related)  Symptom onset:  1-3 days ago  Symptom intensity:  Mild  Symptom progression:  Staying the same  Had these symptoms before:  No  What makes it worse:  Nothing  What makes it better:  Nothing   She is taking medications regularly.       Genitourinary - Female  Onset/Duration: 3-4 days  Description:   Painful urination (Dysuria): YES           Frequency: YES  Blood in urine (Hematuria): No  Delay in urine (Hesitency): YES  Intensity: moderate, severe  Progression of Symptoms:  worsening  Accompanying Signs & Symptoms:  Fever/chills: No  Flank pain: No  Nausea and vomiting: No  Vaginal symptoms: none  Abdominal/Pelvic Pain: No  History:   History of frequent UTI s: " "No  History of kidney stones: No  Sexually Active: No  Possibility of pregnancy: No  Precipitating or alleviating factors: None  Therapies tried and outcome:  none     Review of Systems  See HPI       Objective    /78 (BP Location: Right arm, Patient Position: Sitting, Cuff Size: Adult Large)   Pulse 71   Temp 97.7  F (36.5  C) (Tympanic)   Resp 22   Ht 1.689 m (5' 6.5\")   Wt 75.8 kg (167 lb)   LMP 04/02/2004 (Approximate)   SpO2 94%   BMI 26.55 kg/m    Body mass index is 26.55 kg/m .  Physical Exam   Constitutional: healthy, alert, and no distress  Head: Normocephalic. Atraumatic  Eyes: No conjunctival injection, sclera anicteric  Neck: supple, no thyromegaly, nodules or asymmetry of the thyroid. No cervical LAD.  Cardiovascular: RRR. No murmurs, clicks, gallops, or rubs. No peripheral edema.   Respiratory: No resp distress. Lungs CTAB bilaterally.   Musculoskeletal: extremities normal- no gross deformities noted, and normal muscle tone  Skin: no suspicious lesions or rashes  Neurologic: Gait stable with walker. CN 2-12 grossly intact  Psychiatric: mentation appears normal and affect normal/bright     Results for orders placed or performed in visit on 11/05/24   UA Macroscopic with reflex to Microscopic and Culture - Clinic Collect     Status: Normal    Specimen: Urine, Clean Catch   Result Value Ref Range    Color Urine Yellow Colorless, Straw, Light Yellow, Yellow    Appearance Urine Clear Clear    Glucose Urine Negative Negative mg/dL    Bilirubin Urine Negative Negative    Ketones Urine Negative Negative mg/dL    Specific Gravity Urine 1.020 1.003 - 1.035    Blood Urine Negative Negative    pH Urine 6.5 5.0 - 7.0    Protein Albumin Urine Negative Negative mg/dL    Urobilinogen Urine 0.2 0.2, 1.0 E.U./dL    Nitrite Urine Negative Negative    Leukocyte Esterase Urine Negative Negative    Narrative    Microscopic not indicated            Signed Electronically by: Zach Nguyen PA-C    "

## 2024-12-10 ENCOUNTER — OFFICE VISIT (OUTPATIENT)
Dept: AUDIOLOGY | Facility: CLINIC | Age: 79
End: 2024-12-10
Attending: PHYSICIAN ASSISTANT
Payer: MEDICARE

## 2024-12-10 DIAGNOSIS — H61.22 IMPACTED CERUMEN OF LEFT EAR: Primary | ICD-10-CM

## 2024-12-10 DIAGNOSIS — H91.90 HARD OF HEARING: ICD-10-CM

## 2024-12-10 PROCEDURE — 92550 TYMPANOMETRY & REFLEX THRESH: CPT | Performed by: AUDIOLOGIST

## 2024-12-10 NOTE — PROGRESS NOTES
AUDIOLOGY REPORT    SUBJECTIVE:  Loraine Aviles is a 79 year old female who was seen in the Audiology Clinic at the [facility] for audiologic evaluation, referred by Zach Nguyen PA-C .No previous audiograms are available at today's appointment.  The patient reports a decrease in hearing with tinnitus bilaterally. The patient denies  bilateral otalgia and bilateral drainage.  The patient notes difficulty with communication in a variety of listening situations. They were accompanied today by their group home attendant.    OBJECTIVE:    Otoscopic exam indicates Left ear occluded with cerumen,right ear non-occluding cerumen      Tympanogram:    RIGHT: restricted eardrum mobility (As)    LEFT:   restricted eardrum mobility (As with small volume)    Deferred further hearing testing until her left ear is cleaned.       ASSESSMENT:     ICD-10-CM    1. Hard of hearing  H91.90 Adult Audiology  Referral           Today s results were discussed with the patient and her  in detail.     PLAN:  It is recommended that the patient be seen by an ENT for ear cleaning followed by a hearing evaluation.  Please call this clinic with questions regarding these results or recommendations.        Frances CADET-WOLF, #[id number]

## 2024-12-30 ENCOUNTER — OFFICE VISIT (OUTPATIENT)
Dept: URGENT CARE | Facility: URGENT CARE | Age: 79
End: 2024-12-30
Payer: MEDICARE

## 2024-12-30 ENCOUNTER — ANCILLARY PROCEDURE (OUTPATIENT)
Dept: GENERAL RADIOLOGY | Facility: CLINIC | Age: 79
End: 2024-12-30
Attending: FAMILY MEDICINE
Payer: MEDICARE

## 2024-12-30 VITALS
RESPIRATION RATE: 22 BRPM | SYSTOLIC BLOOD PRESSURE: 129 MMHG | TEMPERATURE: 98 F | OXYGEN SATURATION: 97 % | HEART RATE: 58 BPM | DIASTOLIC BLOOD PRESSURE: 72 MMHG

## 2024-12-30 DIAGNOSIS — R62.50 DELAY IN DEVELOPMENT: ICD-10-CM
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
## 2024-12-30 DIAGNOSIS — M51.369 DEGENERATION OF INTERVERTEBRAL DISC OF LUMBAR REGION WITHOUT DISCOGENIC BACK PAIN OR LOWER EXTREMITY PAIN: ICD-10-CM

## 2024-12-30 DIAGNOSIS — R29.6 FALLS FREQUENTLY: ICD-10-CM

## 2024-12-30 DIAGNOSIS — M54.6 ACUTE MIDLINE THORACIC BACK PAIN: Primary | ICD-10-CM

## 2024-12-30 DIAGNOSIS — M81.0 AGE-RELATED OSTEOPOROSIS WITHOUT CURRENT PATHOLOGICAL FRACTURE: ICD-10-CM

## 2024-12-30 DIAGNOSIS — M54.6 ACUTE MIDLINE THORACIC BACK PAIN: ICD-10-CM

## 2024-12-30 PROCEDURE — 99213 OFFICE O/P EST LOW 20 MIN: CPT | Performed by: FAMILY MEDICINE

## 2024-12-30 PROCEDURE — 72080 X-RAY EXAM THORACOLMB 2/> VW: CPT | Mod: TC | Performed by: RADIOLOGY

## 2024-12-30 NOTE — PROGRESS NOTES
Assessment & Plan       ICD-10-CM    1. Acute midline thoracic back pain  M54.6 XR Thoracic Lumbar Spine 2 Views      2. Fall, initial encounter  W19.XXXA       3. Degeneration of intervertebral disc of lumbar region without discogenic back pain or lower extremity pain  M51.369       4. Age-related osteoporosis without current pathological fracture  M81.0       5. Delay in development  R62.50       6. Falls frequently  R29.6          Her acute issues today are stable with no evidence for serious injury.  I could not identify an acute fracture in her spine although she has evidence for an old compression fracture that looks stable.  However I do think she has progressive retrolisthesis of L2 on L3 rather anterolisthesis of L3 compared to L2.  She appeared to have a little bit of this on her prior x-ray but it does look more pronounced today.  I am not sure if that is from her fall or if that is just from time and advanced degenerative changes.  However she is not having any neurologic symptoms and her localized pain is very manageable.  She has acetaminophen at home and feels that that is all she will need.  We talked about watching for neurologic symptoms of concern and following up with her primary care provider and potentially needing to have an MRI or intervention at some point in the future.    Because of her developmental delay and her living in a group home and having osteoporosis with a history of falls she is at risk for progressive disease and needs to be closely monitored.  She does use a walker and has supervision in her group home.  I did complete her paperwork for her visit today but did not order anything new today.  I did cover her light abrasion on the back with a Band-Aid and for the abrasions on her left forearm we will just monitor those for healing and watch for signs of infection.    Tabby Vazquez MD  [facility] [place]    Paola Baron is a 79  year old female who presents to clinic today for the following health issues:  Chief Complaint   Patient presents with    Back Pain     Been sick since Wednesday, not eating, fell backwards this morning, when fell back she hit her mid back. Says has a history of breaking bones. Happened around 9am this morning.     HPI    Loraine is accompanied by Paola, her health care manager at the group home today.  She has lived in a group home all of her life.  She has a developmental delay and a seizure disorder, but overall is very healthy and very active.  She walks with a walker but she does have a history of falls.  She also has a history of osteoporosis and was on alendronate for many years but then recently stopped it.  She has multiple other fractures as well.      Today she got up after taking her medications and she felt dizzy and then fell.  Paola did not witness the fall but was told that she did not blackout and did not strike her head.  She landed on her back and has a small abrasion on her mid back and some pain there.  Because of her history they want to make sure she did not break anything.  She is not aware of any other injuries.    She has a history of seizures and has not had a seizure in over 50 years but remains on Depakote and Tegretol.  She does not feel like this was seizure related.  No post-ictal issues. She had been sick for a few days and has not had much to eat in the last few days but she did eat and drink after this.  She had breakfast and she has been drinking water.  They did have some viral infections going through the group home but overall Loraine is feeling better.      7 pt ROS is otherwise negative except as noted in HPI.      Objective    /72   Pulse 58   Temp 98  F (36.7  C) (Tympanic)   Resp 22   LMP 04/02/2004 (Approximate)   SpO2 97%   Physical Exam   Vitals noted.  Patient alert, oriented, and in no acute distress.   She is verbal and able to recount her history to me  with some cuing from Paola.   Head is atraumatic and normocephalic. Speech clear.   Exam of her back shows a small abrasion over approximately L1. No bleeding. I started palpating from the cervical spine down and when I got near the abrasion she complained of tenderness. I then palpated starting over the sacrum and moving upward and was able to palpate the entire spine, and re-palpated the area in question 3 times without any report of pain. She has a moderate throacic kyphosis which looks chronic.   Normal gait with a walker and she is independent in getting up and down.   She has a linear abrasion on the left forearm along the mid ulna extending up near the elbow.  No significant bruising and no bleeding.    Xray of the thoracolumbar spine was taken today and independently reviewed by me.  She has a moderate kyphosis and there appears to be an old stable compression fracture of L1 that appears similar to prior x-ray.  There also be appears to be slight compression of L2 and more noticeably there is retrolisthesis of L2 on L3 primarily that appears worse than on prior exam.  No fractures were seen.    Results for orders placed or performed in visit on 12/30/24   XR Thoracic Lumbar Spine 2 Views     Status: None (Preliminary result)    Narrative    THORACIC LUMBAR SPINE TWO VIEWS  12/30/2024 1:37 PM     HISTORY: Fall, pain over lower thoracic vertebrae, history of  osteoporosis with prior vertebral fracture, rule out new fracture.  Acute midline thoracic back pain.    COMPARISON: CT of the lumbar spine 6/28/2020.      Impression    IMPRESSION: The field of view covers T9-S1. There is diffuse osseous  demineralization that limits evaluation for fracture. Again seen is a  wedge compression fracture of the L1 vertebral body with mild to  moderate height loss. There is also a mild L2 compression deformity  which was present to some degree on the previous CT. No obvious  new/significantly progressed vertebral body height  loss is identified  by radiographs. If there is ongoing clinical concern for  radiographically occult fracture, cross-sectional imaging can be  considered.    There is mild stepwise retrolisthesis of L1 on L2, L2 on L3 and L3 on  L4. Mildly exaggerated kyphosis centered in the lower thoracic region.  Alignment otherwise appears within normal limits. Mild/mild to  moderate multilevel disc space narrowing with marginal endplate  osteophytes. Scattered degenerative facet arthropathy. Partial  visualization of a neurostimulator device projecting over the right  aspect of the pelvis, as before, incompletely evaluated.

## 2025-02-06 ENCOUNTER — TELEPHONE (OUTPATIENT)
Dept: FAMILY MEDICINE | Facility: CLINIC | Age: 80
End: 2025-02-06
Payer: MEDICARE

## 2025-02-06 DIAGNOSIS — N39.498 OTHER URINARY INCONTINENCE: Primary | ICD-10-CM

## 2025-02-06 NOTE — TELEPHONE ENCOUNTER
Order/Referral Request    Who is requesting: [employer]    Orders being requested: gloves qty 400 per month due to incontinence products for home use.     Reason service is needed/diagnosis: incontinence    When are orders needed by: asap    [email or web address] fax [phone]  Phone [phone] --ly

## 2025-04-24 ENCOUNTER — TELEPHONE (OUTPATIENT)
Dept: FAMILY MEDICINE | Facility: CLINIC | Age: 80
End: 2025-04-24
Payer: MEDICARE

## 2025-04-24 DIAGNOSIS — N39.498 OTHER URINARY INCONTINENCE: Primary | ICD-10-CM

## 2025-04-24 DIAGNOSIS — R62.50 DELAY IN DEVELOPMENT: ICD-10-CM

## 2025-04-24 NOTE — TELEPHONE ENCOUNTER
[facility] is requesting  a new RX good for 1 years refill  Under ware/Pull-up 300 per month (Patient does not use diaper/ brief RX must state underwear/pull up for insurance billing compliance)  Under-pads/chux 60 per month  Gloves 400 Per month    Please include refill amounts along with all associated diagnosis codes pertaining product for insurance compliance    Fax: [phone]    Dominique Turner/ Patient

## 2025-05-05 NOTE — PROGRESS NOTES
Loraine Aviles is a 79 year old female  Chief Complaint: Ear cleaning  History of Present Illness  Location:  Quality:  Severity:  Duration:    Past Medical History -   Patient Active Problem List   Diagnosis    Seizure disorder (H)    Edema    Delay in development    Other urinary incontinence    Urgency of urination    HYPERLIPIDEMIA LDL GOAL <130    Benign neoplasm of colon    Nuclear cataract    Age-related osteoporosis without current pathological fracture    Mild episode of recurrent major depressive disorder    Falls frequently    Tremor    Memory changes       Current Medications -   Current Outpatient Medications:     acetaminophen (TYLENOL) 500 MG tablet, TAKE 2 TABLETS (1000MG) BY MOUTH THREE TIMES DAILY AS NEEDED --MAXIMUM OF 4000MG ACETAMINOPHEN IN 24 HOURS--, Disp: 90 tablet, Rfl: 0    calcium carbonate-vitamin D (OYSTER SHELL CALCIUM/D) 500-200 MG-UNIT tablet, TAKE 1 TABLET BY MOUTH THREE TIMES DAILY., Disp: 90 tablet, Rfl: 11    carBAMazepine (TEGRETOL) 200 MG tablet, Take 1 tablet (200 mg) by mouth every morning AND 1.5 tablets (300 mg) every evening. 200 mg in Am and 300 mg in the PM., Disp: 75 tablet, Rfl: 11    divalproex sodium delayed-release (DEPAKOTE) 500 MG DR tablet, Take 1 tablet (500 mg) by mouth 3 times daily., Disp: 90 tablet, Rfl: 11    FLUoxetine (PROZAC) 10 MG capsule, Take 1 capsule (10 mg) by mouth daily., Disp: 30 capsule, Rfl: 11    FLUoxetine (PROZAC) 20 MG capsule, Take 1 capsule (20 mg) by mouth daily., Disp: 30 capsule, Rfl: 11    furosemide (LASIX) 40 MG tablet, Take 1 tablet (40 mg) by mouth daily., Disp: 30 tablet, Rfl: 11    ibuprofen (ADVIL,MOTRIN) 200 MG tablet, Take 200 mg by mouth every 4 hours as needed for mild pain., Disp: , Rfl:     multivitamin w/minerals (CERTAVITE/ANTIOXIDANTS) tablet, Take 1 tablet by mouth daily, Disp: 90 tablet, Rfl: 3    multivitamins w/minerals tablet, Take 1 tablet by mouth daily, Disp: 90 tablet, Rfl: 0    potassium chloride conrad ER  (KLOR-CON M20) 20 MEQ CR tablet, Take 1 tablet (20 mEq) by mouth daily., Disp: 30 tablet, Rfl: 11    simvastatin (ZOCOR) 20 MG tablet, TAKE 1 TABLET BY MOUTH AT BEDTIME, Disp: 30 tablet, Rfl: 11    Allergies -   Allergies   Allergen Reactions    Detrol [Tolterodine Tartrate] Other (See Comments)     falls    Ditropan [Oxybutynin Chloride] Other (See Comments)     falls    Medicated Other (See Comments)     Allergic to generic seizure meds.       Social History -   Social History     Socioeconomic History    Marital status: Single   Tobacco Use    Smoking status: Never    Smokeless tobacco: Never   Vaping Use    Vaping status: Never Used   Substance and Sexual Activity    Alcohol use: No    Drug use: No    Sexual activity: Never   Other Topics Concern    Parent/sibling w/ CABG, MI or angioplasty before 65F 55M? No     Social Drivers of Health     Financial Resource Strain: Low Risk  (10/14/2024)    Financial Resource Strain     Within the past 12 months, have you or your family members you live with been unable to get utilities (heat, electricity) when it was really needed?: No   Food Insecurity: Low Risk  (10/14/2024)    Food Insecurity     Within the past 12 months, did you worry that your food would run out before you got money to buy more?: No     Within the past 12 months, did the food you bought just not last and you didn t have money to get more?: No   Transportation Needs: Low Risk  (10/14/2024)    Transportation Needs     Within the past 12 months, has lack of transportation kept you from medical appointments, getting your medicines, non-medical meetings or appointments, work, or from getting things that you need?: No   Physical Activity: Insufficiently Active (10/14/2024)    Exercise Vital Sign     Days of Exercise per Week: 7 days     Minutes of Exercise per Session: 20 min   Stress: No Stress Concern Present (10/14/2024)    [place] [employer] - Occupational Stress Questionnaire      Feeling of Stress : Not at all   Social Connections: Unknown (10/14/2024)    Social Connection and Isolation Panel [NHANES]     Frequency of Social Gatherings with Friends and Family: Three times a week   Housing Stability: Low Risk  (10/14/2024)    Housing Stability     Do you have housing? : Yes     Are you worried about losing your housing?: No       Family History -   Family History   Problem Relation Age of Onset    Family History Negative No family hx of        Review of Systems:   !.  Weight Loss: No   2. Difficulty Breathing: No   3. Difficulty Swallowing: No   4. Pain: No    Physical Exam  B/P: Data Unavailable, T: Data Unavailable, P: Data Unavailable, R: Data Unavailable  Vitals: LMP 04/02/2004 (Approximate)   BMI= There is no height or weight on file to calculate BMI.    General  Appearance - Normal  Head/Face/Scalp:    Skin - Normal    Facial Palpation - Normal    Facial Strength - Normal  Ears:    Pinna - Normal    Canal - Normal, impacted cerumen, left   Tympanic membrane - Normal  Nose:    External - Normal    Septum - Normal    Turbinates - Normal    Middle meatus - Normal  Oral Cavity:    Lips - Normal    Floor of Mouth - Normal    Gingiva - Normal    Tongue - Normal    Buccal - Normal    Palate - Normal  Nasopharynx:    Oropharynx:    Tonsils - Normal    Tongue base - Normal    Soft palate - Normal    Posterior pharyngeal wall - Normal  Hypopharynx:  Larynx:    Epiglottis -     Aryepiglottic folds -     Arytenoids -     False vocal cords -     True vocal cords -  Neck Masses - No  Neck lymphatics - no lymphadenopathy  Thyroid - Normal  Salivary glands - Normal    Audiogram - not applicable  Radiology - not applicable   Reports:   View films:  Procedures -   Cerumen Removal    Physical Exam and Procedure  Ears - On examination of the ears, I found that the LEFT side had cerumen impaction.  Therefore, I positioned them in the examination chair in a semi-supine position, beginning with the right  side.  I used the binocular surgical microscope to perform cerumen removal.  I began by using a cerumen loop to gently lift the edges of the cerumen mass away from the walls of the external canal.  Once I did this, I was able to suction away fragments of wax and debris using suction.  Once the mass was loose enough, the entire plug was pulled from the canal.  The tympanic membrane was intact, no sign of perforation or middle ear effusion.      A/P - Loraine Aviles is a 79 year old female  Medical Decision Making 1. Impacted cerumen removed from the left ear  with instruments

## 2025-05-08 ENCOUNTER — OFFICE VISIT (OUTPATIENT)
Dept: OTOLARYNGOLOGY | Facility: CLINIC | Age: 80
End: 2025-05-08
Payer: MEDICARE

## 2025-05-08 ENCOUNTER — OFFICE VISIT (OUTPATIENT)
Dept: AUDIOLOGY | Facility: CLINIC | Age: 80
End: 2025-05-08
Payer: MEDICARE

## 2025-05-08 VITALS
WEIGHT: 160 LBS | SYSTOLIC BLOOD PRESSURE: 139 MMHG | BODY MASS INDEX: 25.44 KG/M2 | DIASTOLIC BLOOD PRESSURE: 60 MMHG | TEMPERATURE: 98 F | HEART RATE: 64 BPM

## 2025-05-08 DIAGNOSIS — R42 DIZZINESS: ICD-10-CM

## 2025-05-08 DIAGNOSIS — H90.3 SENSORINEURAL HEARING LOSS, BILATERAL: Primary | ICD-10-CM

## 2025-05-08 DIAGNOSIS — H61.22 IMPACTED CERUMEN OF LEFT EAR: Primary | ICD-10-CM

## 2025-05-08 ASSESSMENT — PAIN SCALES - GENERAL: PAINLEVEL_OUTOF10: NO PAIN (0)

## 2025-05-08 NOTE — LETTER
5/8/2025      Loraine Aviles  [id number]   [facility] [address]      Dear Colleague,    Thank you for referring your patient, Loraine Aviles, to the [facility]. Please see a copy of my visit note below.    Loraine Aviles is a 79 year old female  Chief Complaint: Ear cleaning  History of Present Illness  Location:  Quality:  Severity:  Duration:    Past Medical History -   Patient Active Problem List   Diagnosis     Seizure disorder (H)     Edema     Delay in development     Other urinary incontinence     Urgency of urination     HYPERLIPIDEMIA LDL GOAL <130     Benign neoplasm of colon     Nuclear cataract     Age-related osteoporosis without current pathological fracture     Mild episode of recurrent major depressive disorder     Falls frequently     Tremor     Memory changes       Current Medications -   Current Outpatient Medications:      acetaminophen (TYLENOL) 500 MG tablet, TAKE 2 TABLETS (1000MG) BY MOUTH THREE TIMES DAILY AS NEEDED --MAXIMUM OF 4000MG ACETAMINOPHEN IN 24 HOURS--, Disp: 90 tablet, Rfl: 0     calcium carbonate-vitamin D (OYSTER SHELL CALCIUM/D) 500-200 MG-UNIT tablet, TAKE 1 TABLET BY MOUTH THREE TIMES DAILY., Disp: 90 tablet, Rfl: 11     carBAMazepine (TEGRETOL) 200 MG tablet, Take 1 tablet (200 mg) by mouth every morning AND 1.5 tablets (300 mg) every evening. 200 mg in Am and 300 mg in the PM., Disp: 75 tablet, Rfl: 11     divalproex sodium delayed-release (DEPAKOTE) 500 MG DR tablet, Take 1 tablet (500 mg) by mouth 3 times daily., Disp: 90 tablet, Rfl: 11     FLUoxetine (PROZAC) 10 MG capsule, Take 1 capsule (10 mg) by mouth daily., Disp: 30 capsule, Rfl: 11     FLUoxetine (PROZAC) 20 MG capsule, Take 1 capsule (20 mg) by mouth daily., Disp: 30 capsule, Rfl: 11     furosemide (LASIX) 40 MG tablet, Take 1 tablet (40 mg) by mouth daily., Disp: 30 tablet, Rfl: 11     ibuprofen (ADVIL,MOTRIN) 200 MG tablet, Take 200 mg by mouth every 4 hours as needed for  mild pain., Disp: , Rfl:      multivitamin w/minerals (CERTAVITE/ANTIOXIDANTS) tablet, Take 1 tablet by mouth daily, Disp: 90 tablet, Rfl: 3     multivitamins w/minerals tablet, Take 1 tablet by mouth daily, Disp: 90 tablet, Rfl: 0     potassium chloride conrad ER (KLOR-CON M20) 20 MEQ CR tablet, Take 1 tablet (20 mEq) by mouth daily., Disp: 30 tablet, Rfl: 11     simvastatin (ZOCOR) 20 MG tablet, TAKE 1 TABLET BY MOUTH AT BEDTIME, Disp: 30 tablet, Rfl: 11    Allergies -   Allergies   Allergen Reactions     Detrol [Tolterodine Tartrate] Other (See Comments)     falls     Ditropan [Oxybutynin Chloride] Other (See Comments)     falls     Medicated Other (See Comments)     Allergic to generic seizure meds.       Social History -   Social History     Socioeconomic History     Marital status: Single   Tobacco Use     Smoking status: Never     Smokeless tobacco: Never   Vaping Use     Vaping status: Never Used   Substance and Sexual Activity     Alcohol use: No     Drug use: No     Sexual activity: Never   Other Topics Concern     Parent/sibling w/ CABG, MI or angioplasty before 65F 55M? No     Social Drivers of Health     Financial Resource Strain: Low Risk  (10/14/2024)    Financial Resource Strain      Within the past 12 months, have you or your family members you live with been unable to get utilities (heat, electricity) when it was really needed?: No   Food Insecurity: Low Risk  (10/14/2024)    Food Insecurity      Within the past 12 months, did you worry that your food would run out before you got money to buy more?: No      Within the past 12 months, did the food you bought just not last and you didn t have money to get more?: No   Transportation Needs: Low Risk  (10/14/2024)    Transportation Needs      Within the past 12 months, has lack of transportation kept you from medical appointments, getting your medicines, non-medical meetings or appointments, work, or from getting things that you need?: No   Physical  Activity: Insufficiently Active (10/14/2024)    Exercise Vital Sign      Days of Exercise per Week: 7 days      Minutes of Exercise per Session: 20 min   Stress: No Stress Concern Present (10/14/2024)    [employer] [place] [employer] - Occupational Stress Questionnaire      Feeling of Stress : Not at all   Social Connections: Unknown (10/14/2024)    Social Connection and Isolation Panel [NHANES]      Frequency of Social Gatherings with Friends and Family: Three times a week   Housing Stability: Low Risk  (10/14/2024)    Housing Stability      Do you have housing? : Yes      Are you worried about losing your housing?: No       Family History -   Family History   Problem Relation Age of Onset     Family History Negative No family hx of        Review of Systems:   !.  Weight Loss: No   2. Difficulty Breathing: No   3. Difficulty Swallowing: No   4. Pain: No    Physical Exam  B/P: Data Unavailable, T: Data Unavailable, P: Data Unavailable, R: Data Unavailable  Vitals: LMP 04/02/2004 (Approximate)   BMI= There is no height or weight on file to calculate BMI.    General  Appearance - Normal  Head/Face/Scalp:    Skin - Normal    Facial Palpation - Normal    Facial Strength - Normal  Ears:    Pinna - Normal    Canal - Normal, impacted cerumen, left   Tympanic membrane - Normal  Nose:    External - Normal    Septum - Normal    Turbinates - Normal    Middle meatus - Normal  Oral Cavity:    Lips - Normal    Floor of Mouth - Normal    Gingiva - Normal    Tongue - Normal    Buccal - Normal    Palate - Normal  Nasopharynx:    Oropharynx:    Tonsils - Normal    Tongue base - Normal    Soft palate - Normal    Posterior pharyngeal wall - Normal  Hypopharynx:  Larynx:    Epiglottis -     Aryepiglottic folds -     Arytenoids -     False vocal cords -     True vocal cords -  Neck Masses - No  Neck lymphatics - no lymphadenopathy  Thyroid - Normal  Salivary glands - Normal    Audiogram - not applicable  Radiology - not  applicable   Reports:   View films:  Procedures -   Cerumen Removal    Physical Exam and Procedure  Ears - On examination of the ears, I found that the LEFT side had cerumen impaction.  Therefore, I positioned them in the examination chair in a semi-supine position, beginning with the right side.  I used the binocular surgical microscope to perform cerumen removal.  I began by using a cerumen loop to gently lift the edges of the cerumen mass away from the walls of the external canal.  Once I did this, I was able to suction away fragments of wax and debris using suction.  Once the mass was loose enough, the entire plug was pulled from the canal.  The tympanic membrane was intact, no sign of perforation or middle ear effusion.      A/P - Loraine Aviles is a 79 year old female  Medical Decision Making 1. Impacted cerumen removed from the left ear  with instruments    Again, thank you for allowing me to participate in the care of your patient.        Sincerely,        Bala Bradley MD    Electronically signed

## 2025-05-08 NOTE — PROGRESS NOTES
AUDIOLOGY REPORT    SUBJECTIVE:  Loraine Aviles is a 79 year old female who was seen in the [facility] on 5/08/25 for audiologic evaluation, referred by Zach Nguyen PA-C.  The patient reports a gradual decline in hearing and some dizziness. The patient denies  bilateral tinnitus, bilateral otalgia, bilateral drainage, bilateral aural fullness, family history of hearing loss, and history of noise exposure. The patient notes difficulty with communication in a variety of listening situations. They were accompanied today by their group home attendant.    Falls Risk Screening  Falls Risk Completed by: Audiology  Have you fallen 2 or more times in the past year? Yes  Have you fallen and had an injury in the past year? No  Is the patient receiving Physical Therapy services? Yes  Fall Screen Comments:             OBJECTIVE:    Otoscopic exam indicates Right ear partly occluded with cerumen, left ear clear     Pure Tone Thresholds assessed using standard techniques  audiometry with fair to good  reliability from 250-8000 Hz bilaterally using insert earphones and circumaural headphones     RIGHT:  moderate and severe sensorineural hearing loss    LEFT:    moderate and severe sensorineural hearing loss   NOTE: Change in transducers did not merit a change in thresholds.     Tympanogram:    RIGHT: restricted eardrum mobility [Type As]    LEFT:   restricted eardrum mobility [Type As]    Reflexes (reported by stimulus ear): 1000 Hz  RIGHT: Ipsilateral is absent at frequencies tested  RIGHT: Contralateral is absent at frequencies tested  LEFT:   Ipsilateral is absent at frequencies tested  LEFT:   Contralateral is absent at frequencies tested    Speech Reception Threshold:    RIGHT: 55 dB HL    LEFT:   65 dB HL    Word Recognition Score:     RIGHT: 52% at 90 dB HL using NU-6 recorded word list.    LEFT:   40% at 90 dB HL using NU-6 recorded word list.    ASSESSMENT:    Bilateral sensorineural hearing loss      Today s results were discussed with the patient in detail. Patient was provided a copy of her audiogram.     PLAN:  Patient was counseled regarding hearing loss and impact on communication.  Patient is a good candidate for amplification at this time. Handout on good communication strategies, and hearing aid use was given to patient. It is recommended that the patient explore amplification.  Please call this clinic with questions regarding these results or recommendations.    Zach Ramirez CCC-A  Licensed Audiologist #[id number]  5/8/2025    CC: Zach Nguyen PA-C

## 2025-05-08 NOTE — NURSING NOTE
"Initial /60 (BP Location: Left arm, Patient Position: Chair, Cuff Size: Adult Large)   Pulse 64   Temp 98  F (36.7  C) (Tympanic)   Wt 72.6 kg (160 lb)   LMP 04/02/2004 (Approximate)   BMI 25.44 kg/m   Estimated body mass index is 25.44 kg/m  as calculated from the following:    Height as of 5/2/25: 1.689 m (5' 6.5\").    Weight as of this encounter: 72.6 kg (160 lb). .  Richelle Kumar LPN    "

## 2025-05-31 ENCOUNTER — TELEPHONE (OUTPATIENT)
Dept: FAMILY MEDICINE | Facility: CLINIC | Age: 80
End: 2025-05-31
Payer: MEDICARE

## 2025-05-31 NOTE — TELEPHONE ENCOUNTER
Forms/Letter Request    Type of form/letter: OTHER: STANDING ORDERS      Do we have the form/letter: Yes:     Who is the form from? Home care    Where did/will the form come from? Patient or family brought in       When is form/letter needed by: ASAP    How would you like the form/letter returned: Fax : [facility] [phone]    Patient Notified form requests are processed in 5-7 business days:Yes    Okay to leave a detailed message?: Yes at Home number on file [phone] (home)

## 2025-05-31 NOTE — TELEPHONE ENCOUNTER
LEBRON REQUIRED TO FAX INFORMATION TO [facility]. LEBRON FAXED TO PATIENT LEGAL GUARDIAN TO BE SIGNED.

## 2025-06-04 ENCOUNTER — MEDICAL CORRESPONDENCE (OUTPATIENT)
Dept: HEALTH INFORMATION MANAGEMENT | Facility: CLINIC | Age: 80
End: 2025-06-04
Payer: MEDICARE